# Patient Record
Sex: FEMALE | Race: WHITE | NOT HISPANIC OR LATINO | Employment: OTHER | URBAN - METROPOLITAN AREA
[De-identification: names, ages, dates, MRNs, and addresses within clinical notes are randomized per-mention and may not be internally consistent; named-entity substitution may affect disease eponyms.]

---

## 2017-01-17 ENCOUNTER — HOSPITAL ENCOUNTER (OUTPATIENT)
Dept: CARDIOLOGY | Facility: HOSPITAL | Age: 62
Discharge: HOME | End: 2017-01-17
Attending: PHYSICIAN ASSISTANT | Admitting: PHYSICIAN ASSISTANT

## 2017-01-26 ENCOUNTER — HOSPITAL ENCOUNTER (OUTPATIENT)
Dept: PREOP | Facility: HOSPITAL | Age: 62
Setting detail: HOSPITAL OUTPATIENT SURGERY
Discharge: HOME | End: 2017-01-26
Attending: SURGERY | Admitting: SURGERY

## 2017-01-26 LAB
ANION GAP SERPL CALC-SCNC: 14.9 MMOL/L (ref 10–20)
BUN SERPL-MCNC: 21 MG/DL (ref 8–20)
BUN/CREAT SERPL: 5.3 (ref 5.4–26.2)
CALCIUM SERPL-MCNC: 8.8 MG/DL (ref 8.9–10.3)
CHLORIDE SERPL-SCNC: 97 MMOL/L (ref 101–111)
CONV CO2: 25 MMOL/L (ref 22–32)
CREAT UR-MCNC: 4 MG/DL (ref 0.4–1)
GLUCOSE BLD-MCNC: 84 MG/DL (ref 70–105)
GLUCOSE SERPL-MCNC: 92 MG/DL (ref 65–99)
HCT VFR BLD AUTO: 35.9 % (ref 35–49)
HGB BLD-MCNC: 11.8 G/DL (ref 12–15)
POTASSIUM SERPL-SCNC: 3.9 MMOL/L (ref 3.6–5.1)
SODIUM SERPL-SCNC: 133 MMOL/L (ref 136–144)

## 2017-03-31 ENCOUNTER — HOSPITAL ENCOUNTER (OUTPATIENT)
Dept: PREOP | Facility: HOSPITAL | Age: 62
Setting detail: HOSPITAL OUTPATIENT SURGERY
Discharge: HOME | End: 2017-03-31
Attending: SURGERY | Admitting: SURGERY

## 2017-03-31 LAB
ANION GAP SERPL CALC-SCNC: 13.4 MMOL/L (ref 10–20)
BUN SERPL-MCNC: 15 MG/DL (ref 8–20)
BUN/CREAT SERPL: 5 (ref 5.4–26.2)
CALCIUM SERPL-MCNC: 8.8 MG/DL (ref 8.9–10.3)
CHLORIDE SERPL-SCNC: 96 MMOL/L (ref 101–111)
CONV CO2: 30 MMOL/L (ref 22–32)
CREAT UR-MCNC: 3 MG/DL (ref 0.4–1)
GLUCOSE BLD-MCNC: 80 MG/DL (ref 70–105)
GLUCOSE SERPL-MCNC: 88 MG/DL (ref 65–99)
HCT VFR BLD AUTO: 36.4 % (ref 35–49)
HGB BLD-MCNC: 12.3 G/DL (ref 12–15)
POTASSIUM SERPL-SCNC: 3.4 MMOL/L (ref 3.6–5.1)
SODIUM SERPL-SCNC: 136 MMOL/L (ref 136–144)

## 2017-04-18 ENCOUNTER — ON CAMPUS - OUTPATIENT (AMBULATORY)
Dept: URBAN - METROPOLITAN AREA HOSPITAL 2 | Facility: HOSPITAL | Age: 62
End: 2017-04-18
Payer: MEDICARE

## 2017-04-18 VITALS
OXYGEN SATURATION: 100 % | DIASTOLIC BLOOD PRESSURE: 53 MMHG | DIASTOLIC BLOOD PRESSURE: 57 MMHG | HEART RATE: 66 BPM | SYSTOLIC BLOOD PRESSURE: 164 MMHG | DIASTOLIC BLOOD PRESSURE: 55 MMHG | DIASTOLIC BLOOD PRESSURE: 69 MMHG | HEART RATE: 71 BPM | HEART RATE: 64 BPM | SYSTOLIC BLOOD PRESSURE: 101 MMHG | OXYGEN SATURATION: 99 % | DIASTOLIC BLOOD PRESSURE: 79 MMHG | SYSTOLIC BLOOD PRESSURE: 123 MMHG | SYSTOLIC BLOOD PRESSURE: 145 MMHG | HEART RATE: 72 BPM | SYSTOLIC BLOOD PRESSURE: 113 MMHG | RESPIRATION RATE: 16 BRPM | SYSTOLIC BLOOD PRESSURE: 137 MMHG | SYSTOLIC BLOOD PRESSURE: 117 MMHG | HEART RATE: 70 BPM | SYSTOLIC BLOOD PRESSURE: 103 MMHG | SYSTOLIC BLOOD PRESSURE: 136 MMHG | HEIGHT: 64 IN | DIASTOLIC BLOOD PRESSURE: 50 MMHG | HEART RATE: 67 BPM | DIASTOLIC BLOOD PRESSURE: 44 MMHG | HEART RATE: 68 BPM | TEMPERATURE: 97 F | DIASTOLIC BLOOD PRESSURE: 64 MMHG | RESPIRATION RATE: 20 BRPM | HEART RATE: 57 BPM | DIASTOLIC BLOOD PRESSURE: 72 MMHG | SYSTOLIC BLOOD PRESSURE: 121 MMHG | SYSTOLIC BLOOD PRESSURE: 176 MMHG | DIASTOLIC BLOOD PRESSURE: 51 MMHG | WEIGHT: 147 LBS | RESPIRATION RATE: 18 BRPM

## 2017-04-18 DIAGNOSIS — Z12.11 ENCOUNTER FOR SCREENING FOR MALIGNANT NEOPLASM OF COLON: ICD-10-CM

## 2017-04-18 DIAGNOSIS — K57.30 DIVERTICULOSIS OF LARGE INTESTINE WITHOUT PERFORATION OR ABS: ICD-10-CM

## 2017-04-18 PROCEDURE — G0121 COLON CA SCRN NOT HI RSK IND: HCPCS | Performed by: INTERNAL MEDICINE

## 2017-04-18 RX ADMIN — PROPOFOL: 10 INJECTION, EMULSION INTRAVENOUS at 10:20

## 2018-07-04 ENCOUNTER — EPISODE CHANGES (OUTPATIENT)
Dept: CASE MANAGEMENT | Facility: OTHER | Age: 63
End: 2018-07-04

## 2018-09-11 ENCOUNTER — EPISODE CHANGES (OUTPATIENT)
Dept: CASE MANAGEMENT | Facility: OTHER | Age: 63
End: 2018-09-11

## 2019-05-30 ENCOUNTER — CONVERSION ENCOUNTER (OUTPATIENT)
Dept: FAMILY MEDICINE CLINIC | Facility: CLINIC | Age: 64
End: 2019-05-30

## 2019-06-04 VITALS
OXYGEN SATURATION: 99 % | SYSTOLIC BLOOD PRESSURE: 160 MMHG | BODY MASS INDEX: 24.99 KG/M2 | HEIGHT: 65 IN | WEIGHT: 150 LBS | DIASTOLIC BLOOD PRESSURE: 87 MMHG | HEART RATE: 81 BPM

## 2019-06-05 ENCOUNTER — CONVERSION ENCOUNTER (OUTPATIENT)
Dept: FAMILY MEDICINE CLINIC | Facility: CLINIC | Age: 64
End: 2019-06-05

## 2019-06-05 VITALS
BODY MASS INDEX: 25.66 KG/M2 | SYSTOLIC BLOOD PRESSURE: 147 MMHG | HEART RATE: 93 BPM | OXYGEN SATURATION: 98 % | HEIGHT: 65 IN | WEIGHT: 154 LBS | DIASTOLIC BLOOD PRESSURE: 67 MMHG

## 2019-06-06 NOTE — PROGRESS NOTES
Visit Type:  Acute Visit  Referring Provider:  Naty Kraus DO  Primary Provider:  Naty GREWAL DO    CC:  possible uti, burning, dark urine and discharge.    History of Present Illness:  65 y/o C female here w/ c/o's dysuria x 3 days----Pt goes to HD 2 days a week and only prod urine in am usu.......no hx/o freq UTI's per pt      Vital Signs:    Patient Profile:    64 Years Old Female  Height:     65 inches (165.1 cm)  Weight:     150 pounds  BMI:        24.96     O2 Sat:     99 %  Temp:       98.6 degrees F oral  Pulse rate: 81 / minute  Pulse rhythm:   regular  BP Sitting: 160 / 87  (right arm)    Cuff size:  regular      Problems: Active problems were reviewed with the patient during this visit.  Medications: Medications were reviewed with the patient during this visit.  Allergies: Allergies were reviewed with the patient during this visit.        Vitals Entered By: Anya Deleon (May 30, 2019 3:18 PM)    Past History  Past Medical History (reviewed - no changes required): DMII  HTN  Urinary incont  CAD---  CHOL  Fatty Liver  Hiatal Hernia  Non Erosive Gastritis/ GERD  ASVD----abdominal aorta  Peripheral Neuropathy  ESRD  w/ HD on / / SAT   Anemia of chronic disease  Anxiety   Sz Dx    Surgical History (reviewed - no changes required): Maria Eugenia  ROLA w/ bladder susp/ LSO  Lt ankle Fx w/ORIF  Vitreous hemorrhage and retinal surgery right eye 11  Liver biopsy  EGD x 2  Colonoscopy----2016  Cardiac cath with stent placement ----2018  Dr. Ranjan HUNG Brachiocephalic Fistula ----  Revision of left brachiocephlaic fistula-------   Dr. Floyd  Gastric Bypass 2013 converted to gastric sleeve  due to non-healing ulcer  Heart Cath-----   PTCA----2018    Family History (reviewed - no changes required): Mom----CAD/ DMII/ HTN  Dad---- @ 57 y/o Lung Cancer  Bro---    Social History (reviewed - no changes required): NO TOB/ ETOH/ ILLICITS     ---2019  FLU---  Pneumovax  13/     Family History Summary:      Reviewed history Last on 2019 and no changes required:2019      General Comments - FH:  Mom----CAD/ DMII/ HTN  Dad---- @ 57 y/o Lung Cancer  Bro---      Social History:     Reviewed history from 2019 and no changes required:        NO TOB/ ETOH/ ILLICITS                 ---2019        FLU---        Pneumovax 13/       Risk Factors:     Smoked Tobacco Use:  Never smoker  Smokeless Tobacco Use:  Never  Passive smoke exposure:  no  Drug use:  no  HIV high-risk behavior:  no  Caffeine use:  2 drinks per day  Alcohol use:  no  Exercise:  yes     Type of Exercise:  walking and treadmill  Seatbelt use:  100 %  Sun Exposure:  rarely    Family History Risk Factors:     Family History of MI in females < 65 years old:  no     Family History of MI in males < 55 years old:  no        Review of Systems     General       Denies fever and chills.           Complains of foul urinary discharge, urinary urgency and painful urination.       Denies blood in urine and kidney pain.      Physical Exam    General:      well developed, well nourished, in no acute distress.    Neurologic:      CN II-XII gross intact.    Psych:      alert and cooperative; normal mood and affect; normal attention span and concentration.        Blood Pressure:  Today's BP: 160/87 mm Hg    Labwork:   Most Recent Lab Results:   LDL: 107 MG/DL (CALC) mg/dL 06/10/2013  HbA1c: : 6.3 % OF TOTAL HGB % 2016      Impression & Recommendations:    Problem # 1:  Symptom, dysuria (ICD-788.1) (HWX66-I67.0)  Assessment: New    Her updated medication list for this problem includes:     Levaquin 250 Mg Oral Tablet (Levofloxacin) ..... 2 today then 1 po q48hrs (after hd on sat)      Problem # 2:  End stage renal disease (ICD-585.6) (NEI33-J75.6)  Assessment: Unchanged    Medications Added to Medication List This Visit:  1)  Levaquin 250 Mg Oral Tablet (Levofloxacin) .... 2 today then 1 po q48hrs  (after hd on sat)    Complete Medication List:  1)  Levaquin 250 Mg Oral Tablet (Levofloxacin) .... 2 today then 1 po q48hrs (after hd on sat)  2)  Percocet  .... 5/325   bid prn  3)  Vitamin B12 1,000 Mcg  .... 1 po qday  4)  Cymbalta 60 Mg Oral Capsule Delayed Release Particles (Duloxetine hcl) .... 1 po qam  5)  Phoslo 667 Mg Oral Capsule (Calcium acetate (phos binder)) .... 2 po qac tid  6)  Sensipar 30 Mg Oral Tablet (Cinacalcet hcl) .... 1 po qday  7)  Cyclobenzapr 10mg Tab (Cyclobenzaprine hcl) .... Take 1/2 to 1 (one-half to one) tablet by mouth once daily as needed hand/foot  muscle  spasms  8)  Ranitidine Hcl 150 Mg Oral Tablet (Ranitidine hcl) .... 1 po bid prn  9)  Fosrenol 500 Mg Oral Tablet Chewable (Lanthanum carbonate) .... Chew 1-3 w/ each meal  10)  Alprazolam 1 Mg Oral Tablet (Alprazolam) .... 1 po qday prn  11)  Zofran 4 Mg Oral Tablet (Ondansetron hcl) .... Take 1 tablet by mouth every 8 hrs as needed  12)  Ibis-south Oral Tablet (B complex-c-folic acid) .... 1 daily  13)  Vitamin D3 1000 Unit Oral Tablet (Cholecalciferol) .... Take 2 tablets by mouth daily  14)  Lyrica 150 Mg Oral Capsule (Pregabalin) .... 1 po qhs                    Medication Administration    Orders Added:  1)  Ofc Vst, Est Level III [34232]  ]      Electronically signed by Naty Kraus DO on 05/31/2019 at 8:13 AM  ________________________________________________________________________       Disclaimer: Converted Note message may not contain all data elements that existed in the legacy source system. Please see [facility] for the original note details.

## 2019-06-06 NOTE — PROGRESS NOTES
Visit Type:  Acute Visit  Referring Provider:  german  Primary Provider:  Naty GREWAL DO      History of Present Illness:  Patient presents today for a very painful knots on the right side of her buttock that has been getting more swollen and painful over the past 6 days.  She can no longer sit on that side of her bottom, she has taken  Her last 2  Percocet in the past couple   days.  She has a history of an abscess 4-6 years ago but otherwise does not have recurrent abscesses or infections.  She does not have diabetes.  She does have chronic renal failure and is on dialysis 2 days a week.     she states she has just completed a course of Levaquin for a UTI.  No continued urine symptoms  Patient is going to visit her father in [place] for 1 month, leaving on  in 4 days.      Vital Signs:    Patient Profile:    64 Years Old Female  Height:     65 inches (165.1 cm)  Weight:     154 pounds  BMI:        25.62     O2 Sat:     98 %  Temp:       99.8 degrees F oral  Pulse rate: 93 / minute  Pulse rhythm:   regular  BP Sittin / 67  (right arm)    Cuff size:  regular      Problems: Active problems were reviewed with the patient during this visit.  Medications: Medications were reviewed with the patient during this visit.  Allergies: Allergies were reviewed with the patient during this visit.        Vitals Entered By: Anya Deleon (2019 9:53 AM)    Past History  Past Medical History (reviewed - no changes required): DMII  HTN  Urinary incont  CAD---  CHOL  Fatty Liver  Hiatal Hernia  Non Erosive Gastritis/ GERD  ASVD----abdominal aorta  Peripheral Neuropathy  ESRD  w/ HD on / / SAT   Anemia of chronic disease  Anxiety   Sz Dx    Surgical History (reviewed - no changes required): Maria Eugenia  ROLA w/ bladder susp/ LSO  Lt ankle Fx w/ORIF  Vitreous hemorrhage and retinal surgery right eye 11  Liver biopsy  EGD x 2  Colonoscopy----2016  Cardiac cath with stent placement ----2018    Ranjan  AV Brachiocephalic Fistula ----2016  Revision of left brachiocephlaic fistula-------   Dr. Floyd  Gastric Bypass 2013 converted to gastric sleeve  due to non-healing ulcer  Heart Cath----- 2018  PTCA----2018    Family History (reviewed - no changes required): Mom----CAD/ DMII/ HTN  Dad---- @ 57 y/o Lung Cancer  Bro---    Social History (reviewed - no changes required): NO TOB/ ETOH/ ILLICITS     ---2019  FLU---  Pneumovax 13/     Family History Summary:      Reviewed history Last on 2019 and no changes required:2019      General Comments - FH:  Mom----CAD/ DMII/ HTN  Dad---- @ 57 y/o Lung Cancer  Bro---      Social History:     Reviewed history from 2019 and no changes required:        NO TOB/ ETOH/ ILLICITS                 ---2019        FLU---        Pneumovax 13/       Risk Factors:     Smoked Tobacco Use:  Never smoker  Smokeless Tobacco Use:  Never  Passive smoke exposure:  no  Drug use:  no  HIV high-risk behavior:  no  Caffeine use:  2 drinks per day  Alcohol use:  no  Exercise:  yes     Type of Exercise:  walking and treadmill  Seatbelt use:  100 %  Sun Exposure:  rarely    Family History Risk Factors:     Family History of MI in females < 65 years old:  no     Family History of MI in males < 55 years old:  no        Review of Systems     General       Denies fever, chills, sweats and anorexia.    GI       Complains of diarrhea, change in bowel habits and constipation.       Denies abdominal pain.        history of irritable bowel syndrome.           Denies foul urinary discharge, blood in urine and painful urination.      Physical Exam    General: Well developed, well nourished,  female appearing uncomfortable sitting, tilting pressure off her right buttock  Skin: gluteal fold just right of midline is a very large red, warm,  indurated abscess with a large ridge of indurated tissue measuring at least 4 x 6 cm total with a  2 cm very soft fluctuant center.  It extends to  within 2 cm of the anus, the fistula   track is visible.  Psych: Alert and cooperative      Procedure: I and D of [place] rectal abscess  Discussed treatment option including recommendation to refer to surgery, patient declines asking that I drain the lesion today  Verbal informed consent was obtained.   patient was positioned  supine on the procedure table, the area was cleansed with iodine x3, 3 cc lidocaine with epinephrine were infiltrated into the middle 2 cm of the lesion.  Eleven blade scalpel was used to make a 0.5 cm incision and copious amounts   of thin off-white with grayish and greenish tinted purulent drainage immediately rolled out. additional incision was made to make an X and a total of around 30 cc of drainage was expressed. the area was packed with about 2 inches of 1 inch wide packing,.     A folded 4 x 4 gauze was placed in the gluteal fold taping at the top. patient tolerated well, reporting immediate improvement in pain level.        Blood Pressure:  Today's BP: 147/67 mm Hg    Labwork:   Most Recent Lab Results:   LDL: 107 MG/DL (CALC) mg/dL 06/10/2013  HbA1c: : 6.3 % OF TOTAL HGB % 09/14/2016      Impression & Recommendations:    Problem # 1:  Perirectal abscess (ICD-566) (TBB84-U42.1)    Orders:  I & D Complicated/Multiple (20597)      Problem # 2:  Irritable bowel syndrome with diarrhea (ICD-564.1) (KXK63-I37.0)    May take Imodium as needed to reduce diarrhea while abscess is healing    Problem # 3:  CKD stage VI ESRD on dialysis GFR <15 (ICD-585.6) (NPW78-J80.6)   continue dialysis as scheduled    Medications Added to Medication List This Visit:  1)  Bactrim Ds 800-160 Mg Oral Tablet (Sulfamethoxazole-trimethoprim) .... 1 now, then 1/2 bid for 10 days    Complete Medication List:  1)  Bactrim Ds 800-160 Mg Oral Tablet (Sulfamethoxazole-trimethoprim) .... 1 now, then 1/2 bid for 10 days  2)  Percocet  .... 5/325   bid prn  3)  Vitamin B12  1,000 Mcg  .... 1 po qday  4)  Cymbalta 60 Mg Oral Capsule Delayed Release Particles (Duloxetine hcl) .... 1 po qam  5)  Phoslo 667 Mg Oral Capsule (Calcium acetate (phos binder)) .... 2 po qac tid  6)  Sensipar 30 Mg Oral Tablet (Cinacalcet hcl) .... 1 po qday  7)  Cyclobenzapr 10mg Tab (Cyclobenzaprine hcl) .... Take 1/2 to 1 (one-half to one) tablet by mouth once daily as needed hand/foot  muscle  spasms  8)  Ranitidine Hcl 150 Mg Oral Tablet (Ranitidine hcl) .... 1 po bid prn  9)  Fosrenol 500 Mg Oral Tablet Chewable (Lanthanum carbonate) .... Chew 1-3 w/ each meal  10)  Alprazolam 1 Mg Oral Tablet (Alprazolam) .... 1 po qday prn  11)  Zofran 4 Mg Oral Tablet (Ondansetron hcl) .... Take 1 tablet by mouth every 8 hrs as needed  12)  Ibis-south Oral Tablet (B complex-c-folic acid) .... 1 daily  13)  Vitamin D3 1000 Unit Oral Tablet (Cholecalciferol) .... Take 2 tablets by mouth daily  14)  Lyrica 150 Mg Oral Capsule (Pregabalin) .... 1 po qhs        Patient Instructions:  1)    The patient was advised to change the gauze   When it becomes saturated,  and with every bowel movement, consider wearing a pad and disposable incontinence underwear to collect any additional drainage. she is to return in 2 days for a wound check and   possible packing change.  She was advised to take Bactrim DS 1 tablets today and then 1/2 tablet twice a day for 10 days holding dose until after dialysis if dialysis is to be done in the following 6 hours.    Medications:  BACTRIM -160 MG ORAL TABLET (SULFAMETHOXAZOLE-TRIMETHOPRIM) 1 now, then 1/2 bid for 10 days  #10[Tablet] x 0      Entered by: Anya Deleon      Authorized by:  Deborah Porras MD      Electronically signed by:   Anya Deleon on 06/05/2019      Method used:    Electronically to               [id number] (retail)              [address]              [place]  [address]              Ph: [phone]              Fax: [phone]      Note  to Pharmacy: Route: ORAL;       RxID:   [id number]                Medication Administration    Orders Added:  1)  Ofc Vst, Est Level IV [34915]  2)  I & D Complicated/Multiple [[id number]]  ]      Electronically signed by Deborah Porras MD on 06/05/2019 at 12:30 PM  ________________________________________________________________________       Disclaimer: Converted Note message may not contain all data elements that existed in the legacy source system. Please see [facility] for the original note details.

## 2019-08-01 PROBLEM — L97.509 TOE ULCER (HCC): Status: ACTIVE | Noted: 2019-05-02

## 2019-08-01 PROBLEM — E78.5 HYPERLIPIDEMIA: Status: ACTIVE | Noted: 2019-08-01

## 2019-08-01 PROBLEM — K58.0 IRRITABLE BOWEL SYNDROME WITH DIARRHEA: Status: ACTIVE | Noted: 2017-10-26

## 2019-08-01 PROBLEM — T30.0 BURN: Status: ACTIVE | Noted: 2018-01-04

## 2019-08-01 PROBLEM — M79.18 BUTTOCK PAIN: Status: ACTIVE | Noted: 2018-01-04

## 2019-08-01 PROBLEM — F41.8 MIXED ANXIETY DEPRESSIVE DISORDER: Status: ACTIVE | Noted: 2017-02-28

## 2019-08-01 PROBLEM — I25.10 CORONARY HEART DISEASE: Status: ACTIVE | Noted: 2018-04-30

## 2019-08-01 PROBLEM — N18.6 END STAGE RENAL DISEASE (HCC): Status: ACTIVE | Noted: 2019-06-05

## 2019-08-01 PROBLEM — K21.9 GASTROESOPHAGEAL REFLUX DISEASE: Status: ACTIVE | Noted: 2019-08-01

## 2019-08-01 PROBLEM — E11.9 TYPE 2 DIABETES MELLITUS (HCC): Status: ACTIVE | Noted: 2019-08-01

## 2019-08-01 PROBLEM — Z88.5 ALLERGY STATUS TO NARCOTIC AGENT: Status: ACTIVE | Noted: 2017-06-20

## 2019-08-01 PROBLEM — R21 RASH: Status: ACTIVE | Noted: 2018-07-12

## 2019-08-01 PROBLEM — R42 POSTURAL LIGHTHEADEDNESS: Status: ACTIVE | Noted: 2018-05-07

## 2019-08-01 PROBLEM — R55 SYNCOPE: Status: ACTIVE | Noted: 2018-05-14

## 2019-08-01 PROBLEM — Z12.11 COLON CANCER SCREENING: Status: ACTIVE | Noted: 2017-02-28

## 2019-08-01 PROBLEM — K61.1 PERIRECTAL ABSCESS: Status: ACTIVE | Noted: 2019-06-05

## 2019-08-01 PROBLEM — R56.9 GENERALIZED SEIZURE (HCC): Status: ACTIVE | Noted: 2018-06-14

## 2019-08-01 PROBLEM — R25.2 MUSCLE CRAMPS: Status: ACTIVE | Noted: 2018-06-14

## 2019-08-01 PROBLEM — IMO0002 MASS: Status: ACTIVE | Noted: 2018-01-04

## 2019-08-01 PROBLEM — Z23 NEED FOR OTHER PROPHYLACTIC VACCINATION AND INOCULATION AGAINST SINGLE DISEASES: Status: ACTIVE | Noted: 2018-10-11

## 2019-08-01 PROBLEM — R30.0 DYSURIA: Status: ACTIVE | Noted: 2019-05-30

## 2019-08-01 PROBLEM — I10 HYPERTENSION: Status: ACTIVE | Noted: 2019-08-01

## 2019-08-01 PROBLEM — Z63.4 BEREAVEMENT: Status: ACTIVE | Noted: 2019-02-25

## 2019-09-05 ENCOUNTER — TELEPHONE (OUTPATIENT)
Dept: FAMILY MEDICINE CLINIC | Facility: CLINIC | Age: 64
End: 2019-09-05

## 2019-09-05 RX ORDER — OXYCODONE HYDROCHLORIDE AND ACETAMINOPHEN 5; 325 MG/1; MG/1
1 TABLET ORAL 2 TIMES DAILY PRN
Qty: 10 TABLET | Refills: 0 | Status: SHIPPED | OUTPATIENT
Start: 2019-09-05 | End: 2019-10-14 | Stop reason: SDUPTHER

## 2019-10-14 ENCOUNTER — OFFICE VISIT (OUTPATIENT)
Dept: FAMILY MEDICINE CLINIC | Facility: CLINIC | Age: 64
End: 2019-10-14

## 2019-10-14 VITALS
TEMPERATURE: 98.4 F | SYSTOLIC BLOOD PRESSURE: 200 MMHG | BODY MASS INDEX: 24.99 KG/M2 | DIASTOLIC BLOOD PRESSURE: 75 MMHG | RESPIRATION RATE: 14 BRPM | WEIGHT: 150 LBS | HEIGHT: 65 IN | HEART RATE: 82 BPM | OXYGEN SATURATION: 96 %

## 2019-10-14 DIAGNOSIS — F41.9 ANXIETY: ICD-10-CM

## 2019-10-14 DIAGNOSIS — G89.29 CHRONIC BILATERAL LOW BACK PAIN WITHOUT SCIATICA: ICD-10-CM

## 2019-10-14 DIAGNOSIS — Z12.31 ENCOUNTER FOR SCREENING MAMMOGRAM FOR BREAST CANCER: ICD-10-CM

## 2019-10-14 DIAGNOSIS — I10 ESSENTIAL HYPERTENSION: Primary | ICD-10-CM

## 2019-10-14 DIAGNOSIS — M65.331 TRIGGER MIDDLE FINGER OF RIGHT HAND: ICD-10-CM

## 2019-10-14 DIAGNOSIS — M54.50 CHRONIC BILATERAL LOW BACK PAIN WITHOUT SCIATICA: ICD-10-CM

## 2019-10-14 PROCEDURE — 99214 OFFICE O/P EST MOD 30 MIN: CPT | Performed by: FAMILY MEDICINE

## 2019-10-14 RX ORDER — FOLIC ACID/VIT B COMPLEX AND C 0.8 MG
1 TABLET ORAL DAILY
Qty: 90 EACH | Refills: 3 | Status: SHIPPED | OUTPATIENT
Start: 2019-10-14

## 2019-10-14 RX ORDER — OXYCODONE HYDROCHLORIDE AND ACETAMINOPHEN 5; 325 MG/1; MG/1
1 TABLET ORAL 2 TIMES DAILY PRN
Qty: 12 TABLET | Refills: 0 | Status: SHIPPED | OUTPATIENT
Start: 2019-10-14 | End: 2019-10-26

## 2019-10-14 RX ORDER — ALPRAZOLAM 1 MG/1
1 TABLET ORAL DAILY PRN
Qty: 12 TABLET | Refills: 0 | Status: ON HOLD | OUTPATIENT
Start: 2019-10-14 | End: 2020-03-13

## 2019-10-14 RX ORDER — CYCLOBENZAPRINE HCL 10 MG
5-10 TABLET ORAL DAILY PRN
Qty: 60 TABLET | Refills: 1 | Status: ON HOLD | OUTPATIENT
Start: 2019-10-14 | End: 2020-03-13

## 2019-10-14 RX ORDER — DULOXETIN HYDROCHLORIDE 60 MG/1
60 CAPSULE, DELAYED RELEASE ORAL EVERY MORNING
Qty: 90 CAPSULE | Refills: 1 | Status: ON HOLD | OUTPATIENT
Start: 2019-10-14 | End: 2019-10-26

## 2019-10-14 RX ORDER — LANTHANUM CARBONATE 500 MG/1
500-1500 TABLET, CHEWABLE ORAL 3 TIMES DAILY
Qty: 630 TABLET | Refills: 3 | Status: ON HOLD | OUTPATIENT
Start: 2019-10-14 | End: 2020-03-13

## 2019-10-14 NOTE — PROGRESS NOTES
Subjective   Michelle Howell is a 64 y.o. female.     Chief Complaint   Patient presents with   • Arthritis     right middle finger arthritis    • Vaginal Discharge     sticky vaginal discharge, no smell to it. Has had since 5/2019.    • Hypertension     needs refills on everything, pt is going to visit her mom she is dying.    • Depression         Current Outpatient Medications:   •  ALPRAZolam (XANAX) 1 MG tablet, Take 1 tablet by mouth Daily As Needed for Anxiety., Disp: 12 tablet, Rfl: 0  •  B Complex-C-Folic Acid (DE-NICKY) tablet, Take 1 tablet by mouth Daily., Disp: 90 each, Rfl: 3  •  cholecalciferol (VITAMIN D3) 1000 units tablet, Take 2 tablets by mouth Daily., Disp: , Rfl:   •  cinacalcet (SENSIPAR) 30 MG tablet, Take 1 tablet by mouth Daily., Disp: , Rfl:   •  cyclobenzaprine (FLEXERIL) 10 MG tablet, Take 0.5-1 tablets by mouth Daily As Needed for Muscle Spasms., Disp: 60 tablet, Rfl: 1  •  DULoxetine (CYMBALTA) 60 MG capsule, Take 1 capsule by mouth Every Morning., Disp: 90 capsule, Rfl: 1  •  lanthanum (FOSRENOL) 500 MG chewable tablet, Chew 1-3 tablets 3 (Three) Times a Day. With meal, Disp: 630 tablet, Rfl: 3  •  LYRICA 150 MG capsule, Take 1 capsule by mouth every night at bedtime., Disp: 90 capsule, Rfl: 1  •  ondansetron (ZOFRAN) 4 MG tablet, Take 1 tablet by mouth Every 8 (Eight) Hours As Needed., Disp: , Rfl:   •  oxyCODONE-acetaminophen (PERCOCET) 5-325 MG per tablet, Take 1 tablet by mouth 2 (Two) Times a Day As Needed for Severe Pain ., Disp: 12 tablet, Rfl: 0  •  vitamin B-12 (CYANOCOBALAMIN) 1000 MCG tablet, Take 1 tablet by mouth Daily., Disp: , Rfl:     Past Medical History:   Diagnosis Date   • Anxiety    • ASVD (arteriosclerotic vascular disease)     Abdominal Aorta   • CAD (coronary artery disease)    • Chronic anemia     Anemia of Chronic Disease   • ESRD (end stage renal disease) (CMS/HCC)     With HD on TU/TH/SAT   • Fatty liver    • Gastritis     Non Erosive   • GERD  (gastroesophageal reflux disease)    • Hiatal hernia    • Hypertension    • Peripheral neuropathy    • Seizure disorder (CMS/HCC)    • Type 2 diabetes mellitus (CMS/HCC)    • Urinary incontinence        Past Surgical History:   Procedure Laterality Date   • ARTERIOVENOUS FISTULA REPAIR Left 2017    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   • COLONOSCOPY  2004    2004, 2016   • CORONARY ANGIOPLASTY  2018   • ENDOSCOPY      x2   • GASTRIC BYPASS  2013    Converted to gastric sleeve 2014 due to non-healing ulcer   • LIVER BIOPSY     • ORIF ANKLE FRACTURE Left     Lt ankle Fx w/ORIF   • OTHER SURGICAL HISTORY Right 07/29/2011    Vitreous hemorrhage and retinal surgery right eye   • TOTAL ABDOMINAL HYSTERECTOMY      w/bladder susp/LSO       Family History   Problem Relation Age of Onset   • Diabetes Mother    • Hypertension Mother    • Coronary artery disease Mother    • Dementia Mother    • Heart disease Mother    • Diabetes Father    • Cancer Father         Lung Cancer       Social History     Socioeconomic History   • Marital status:      Spouse name: Not on file   • Number of children: Not on file   • Years of education: Not on file   • Highest education level: Not on file   Tobacco Use   • Smoking status: Never Smoker   • Smokeless tobacco: Never Used   Substance and Sexual Activity   • Alcohol use: No     Frequency: Never   • Drug use: No   • Sexual activity: Defer       63y/o C female w/ HTN/ Chronic back pain/ Dep/ ESRD on HD 3x/ week here for mult med refills and f/u appt  Pt states her BP is usu high before HD and then normal afterwards    Pt's mom is actively dying w/ Dementia/ COPD/ DMII/ Cardiomyopathy in [place]----Pt is planning on selling her house/ getting a new one in close to town/ and moving her mom (?dad) in w/ her....Pt's brother lives  in [place] but her mom is wanting to move back home      Pt also c/o's Rt hand digit #3 locking when she bends it and has to manually straighten it -----not wanting to see K/K yet for this since pain not that bad          The following portions of the patient's history were reviewed and updated as appropriate: allergies, current medications, past family history, past medical history, past social history, past surgical history and problem list.    Review of Systems   Constitutional: Negative for appetite change, unexpected weight gain and unexpected weight loss.   Respiratory: Negative for cough and shortness of breath.    Cardiovascular: Negative for chest pain and leg swelling.   Gastrointestinal: Negative for nausea and vomiting.   Genitourinary: Positive for vaginal discharge. Negative for vaginal bleeding and vaginal pain.        No vag odor    Musculoskeletal: Positive for arthralgias and back pain. Negative for joint swelling.   Skin: Negative for rash.   Psychiatric/Behavioral: Positive for stress. Negative for sleep disturbance and depressed mood. The patient is not nervous/anxious.        Vitals:    10/14/19 1348   BP: (!) 200/75   Pulse: 82   Resp: 14   Temp: 98.4 °F (36.9 °C)   SpO2: 96%       Objective   Physical Exam   Constitutional: She is oriented to person, place, and time. She appears well-developed and well-nourished. No distress.   HENT:   Head: Normocephalic and atraumatic.   Cardiovascular: Normal rate, regular rhythm and normal heart sounds.   No murmur heard.  Pulmonary/Chest: Effort normal and breath sounds normal. No respiratory distress.   Musculoskeletal: She exhibits no edema.        Arms:  Neurological: She is alert and oriented to person, place, and time. No cranial nerve deficit.   Skin: Skin is warm and dry.   Psychiatric: She has a normal mood and affect. Her behavior is normal. Judgment and thought content normal.   Nursing note and vitals reviewed.        Assessment/Plan   Michelle was  seen today for arthritis, vaginal discharge, hypertension and depression.    Diagnoses and all orders for this visit:    Essential hypertension    Trigger middle finger of right hand    Anxiety    Chronic bilateral low back pain without sciatica    Encounter for screening mammogram for breast cancer  -     Mammo Screening Digital Tomosynthesis Bilateral With CAD; Future    Other orders  -     ALPRAZolam (XANAX) 1 MG tablet; Take 1 tablet by mouth Daily As Needed for Anxiety.  -     oxyCODONE-acetaminophen (PERCOCET) 5-325 MG per tablet; Take 1 tablet by mouth 2 (Two) Times a Day As Needed for Severe Pain .  -     B Complex-C-Folic Acid (DE-NICKY) tablet; Take 1 tablet by mouth Daily.  -     cyclobenzaprine (FLEXERIL) 10 MG tablet; Take 0.5-1 tablets by mouth Daily As Needed for Muscle Spasms.  -     DULoxetine (CYMBALTA) 60 MG capsule; Take 1 capsule by mouth Every Morning.  -     lanthanum (FOSRENOL) 500 MG chewable tablet; Chew 1-3 tablets 3 (Three) Times a Day. With meal  -     LYRICA 150 MG capsule; Take 1 capsule by mouth every night at bedtime.

## 2019-10-26 ENCOUNTER — HOSPITAL ENCOUNTER (INPATIENT)
Facility: HOSPITAL | Age: 64
LOS: 5 days | Discharge: HOME | End: 2019-11-03
Attending: INTERNAL MEDICINE | Admitting: INTERNAL MEDICINE

## 2019-10-26 DIAGNOSIS — I25.118 CORONARY ARTERY DISEASE OF NATIVE ARTERY OF NATIVE HEART WITH STABLE ANGINA PECTORIS (HCC): ICD-10-CM

## 2019-10-26 DIAGNOSIS — R94.39 ABNORMAL NUCLEAR STRESS TEST: Primary | ICD-10-CM

## 2019-10-26 PROBLEM — R06.00 ACUTE DYSPNEA: Status: ACTIVE | Noted: 2019-10-26

## 2019-10-26 LAB
ANION GAP SERPL CALCULATED.3IONS-SCNC: 14 MMOL/L (ref 5–15)
BUN BLD-MCNC: 15 MG/DL (ref 8–23)
BUN/CREAT SERPL: 4.5 (ref 7–25)
CALCIUM SPEC-SCNC: 8.3 MG/DL (ref 8.6–10.5)
CHLORIDE SERPL-SCNC: 96 MMOL/L (ref 98–107)
CO2 SERPL-SCNC: 25 MMOL/L (ref 22–29)
CREAT BLD-MCNC: 3.3 MG/DL (ref 0.57–1)
GFR SERPL CREATININE-BSD FRML MDRD: 14 ML/MIN/1.73
GFR SERPL CREATININE-BSD FRML MDRD: ABNORMAL ML/MIN/{1.73_M2}
GLUCOSE BLD-MCNC: 133 MG/DL (ref 65–99)
POTASSIUM BLD-SCNC: 3.3 MMOL/L (ref 3.5–5.2)
SODIUM BLD-SCNC: 135 MMOL/L (ref 136–145)
TROPONIN T SERPL-MCNC: 0.04 NG/ML (ref 0–0.03)

## 2019-10-26 PROCEDURE — G0378 HOSPITAL OBSERVATION PER HR: HCPCS

## 2019-10-26 PROCEDURE — 25010000002 HEPARIN (PORCINE) PER 1000 UNITS: Performed by: NURSE PRACTITIONER

## 2019-10-26 PROCEDURE — 99222 1ST HOSP IP/OBS MODERATE 55: CPT | Performed by: NURSE PRACTITIONER

## 2019-10-26 PROCEDURE — 80048 BASIC METABOLIC PNL TOTAL CA: CPT | Performed by: NURSE PRACTITIONER

## 2019-10-26 PROCEDURE — 84484 ASSAY OF TROPONIN QUANT: CPT | Performed by: NURSE PRACTITIONER

## 2019-10-26 PROCEDURE — 83036 HEMOGLOBIN GLYCOSYLATED A1C: CPT | Performed by: NURSE PRACTITIONER

## 2019-10-26 PROCEDURE — 25010000002 HYDRALAZINE PER 20 MG: Performed by: INTERNAL MEDICINE

## 2019-10-26 RX ORDER — SODIUM CHLORIDE 0.9 % (FLUSH) 0.9 %
10 SYRINGE (ML) INJECTION AS NEEDED
Status: DISCONTINUED | OUTPATIENT
Start: 2019-10-26 | End: 2019-11-03 | Stop reason: HOSPADM

## 2019-10-26 RX ORDER — NICOTINE POLACRILEX 4 MG
15 LOZENGE BUCCAL
Status: DISCONTINUED | OUTPATIENT
Start: 2019-10-26 | End: 2019-11-03 | Stop reason: HOSPADM

## 2019-10-26 RX ORDER — ASPIRIN 81 MG/1
81 TABLET ORAL DAILY
COMMUNITY

## 2019-10-26 RX ORDER — CYCLOBENZAPRINE HCL 10 MG
5 TABLET ORAL 3 TIMES DAILY PRN
Status: DISCONTINUED | OUTPATIENT
Start: 2019-10-26 | End: 2019-11-03 | Stop reason: HOSPADM

## 2019-10-26 RX ORDER — ACETAMINOPHEN 160 MG/5ML
650 SOLUTION ORAL EVERY 4 HOURS PRN
Status: DISCONTINUED | OUTPATIENT
Start: 2019-10-26 | End: 2019-11-03 | Stop reason: HOSPADM

## 2019-10-26 RX ORDER — BUPROPION HYDROCHLORIDE 150 MG/1
150 TABLET ORAL DAILY
Status: DISCONTINUED | OUTPATIENT
Start: 2019-10-27 | End: 2019-11-03 | Stop reason: HOSPADM

## 2019-10-26 RX ORDER — SODIUM CHLORIDE 0.9 % (FLUSH) 0.9 %
10 SYRINGE (ML) INJECTION EVERY 12 HOURS SCHEDULED
Status: DISCONTINUED | OUTPATIENT
Start: 2019-10-26 | End: 2019-11-03 | Stop reason: HOSPADM

## 2019-10-26 RX ORDER — DICYCLOMINE HYDROCHLORIDE 10 MG/1
10 CAPSULE ORAL 2 TIMES DAILY
Status: ON HOLD | COMMUNITY
End: 2020-03-13

## 2019-10-26 RX ORDER — PREGABALIN 75 MG/1
150 CAPSULE ORAL NIGHTLY
Status: DISCONTINUED | OUTPATIENT
Start: 2019-10-26 | End: 2019-11-03 | Stop reason: HOSPADM

## 2019-10-26 RX ORDER — HYDROCODONE BITARTRATE AND ACETAMINOPHEN 10; 325 MG/1; MG/1
1 TABLET ORAL EVERY 6 HOURS PRN
Status: DISCONTINUED | OUTPATIENT
Start: 2019-10-26 | End: 2019-11-03 | Stop reason: HOSPADM

## 2019-10-26 RX ORDER — ONDANSETRON 2 MG/ML
4 INJECTION INTRAMUSCULAR; INTRAVENOUS EVERY 6 HOURS PRN
Status: DISCONTINUED | OUTPATIENT
Start: 2019-10-26 | End: 2019-11-03 | Stop reason: HOSPADM

## 2019-10-26 RX ORDER — HEPARIN SODIUM 5000 [USP'U]/ML
5000 INJECTION, SOLUTION INTRAVENOUS; SUBCUTANEOUS EVERY 12 HOURS SCHEDULED
Status: DISCONTINUED | OUTPATIENT
Start: 2019-10-26 | End: 2019-10-27

## 2019-10-26 RX ORDER — DICYCLOMINE HYDROCHLORIDE 10 MG/1
10 CAPSULE ORAL 2 TIMES DAILY
Status: DISCONTINUED | OUTPATIENT
Start: 2019-10-26 | End: 2019-11-03 | Stop reason: HOSPADM

## 2019-10-26 RX ORDER — ACETAMINOPHEN 650 MG/1
650 SUPPOSITORY RECTAL EVERY 4 HOURS PRN
Status: DISCONTINUED | OUTPATIENT
Start: 2019-10-26 | End: 2019-11-03 | Stop reason: HOSPADM

## 2019-10-26 RX ORDER — ACETAMINOPHEN 325 MG/1
650 TABLET ORAL EVERY 4 HOURS PRN
Status: DISCONTINUED | OUTPATIENT
Start: 2019-10-26 | End: 2019-11-03 | Stop reason: HOSPADM

## 2019-10-26 RX ORDER — ALPRAZOLAM 1 MG/1
1 TABLET ORAL DAILY PRN
Status: DISCONTINUED | OUTPATIENT
Start: 2019-10-26 | End: 2019-11-03 | Stop reason: HOSPADM

## 2019-10-26 RX ORDER — LEVETIRACETAM 500 MG/1
500 TABLET ORAL 2 TIMES DAILY
Status: DISCONTINUED | OUTPATIENT
Start: 2019-10-26 | End: 2019-11-03 | Stop reason: HOSPADM

## 2019-10-26 RX ORDER — DEXTROSE MONOHYDRATE 25 G/50ML
25 INJECTION, SOLUTION INTRAVENOUS
Status: DISCONTINUED | OUTPATIENT
Start: 2019-10-26 | End: 2019-11-03 | Stop reason: HOSPADM

## 2019-10-26 RX ORDER — BUPROPION HYDROCHLORIDE 150 MG/1
150 TABLET ORAL DAILY
Status: ON HOLD | COMMUNITY
End: 2020-03-13

## 2019-10-26 RX ORDER — ASPIRIN 81 MG/1
81 TABLET ORAL DAILY
Status: DISCONTINUED | OUTPATIENT
Start: 2019-10-27 | End: 2019-11-03 | Stop reason: HOSPADM

## 2019-10-26 RX ORDER — LOPERAMIDE HYDROCHLORIDE 2 MG/1
2 CAPSULE ORAL 4 TIMES DAILY PRN
Status: DISCONTINUED | OUTPATIENT
Start: 2019-10-26 | End: 2019-10-30

## 2019-10-26 RX ORDER — LANTHANUM CARBONATE 500 MG/1
1000 TABLET, CHEWABLE ORAL
Status: DISCONTINUED | OUTPATIENT
Start: 2019-10-27 | End: 2019-11-01

## 2019-10-26 RX ORDER — HYDRALAZINE HYDROCHLORIDE 20 MG/ML
10 INJECTION INTRAMUSCULAR; INTRAVENOUS EVERY 6 HOURS PRN
Status: DISCONTINUED | OUTPATIENT
Start: 2019-10-26 | End: 2019-11-03 | Stop reason: HOSPADM

## 2019-10-26 RX ORDER — ONDANSETRON 4 MG/1
4 TABLET, FILM COATED ORAL EVERY 6 HOURS PRN
Status: DISCONTINUED | OUTPATIENT
Start: 2019-10-26 | End: 2019-11-03 | Stop reason: HOSPADM

## 2019-10-26 RX ORDER — LANOLIN ALCOHOL/MO/W.PET/CERES
1000 CREAM (GRAM) TOPICAL DAILY
Status: DISCONTINUED | OUTPATIENT
Start: 2019-10-27 | End: 2019-11-03 | Stop reason: HOSPADM

## 2019-10-26 RX ORDER — HYDROCODONE BITARTRATE AND ACETAMINOPHEN 5; 325 MG/1; MG/1
1 TABLET ORAL EVERY 6 HOURS PRN
COMMUNITY
End: 2020-04-21 | Stop reason: SDUPTHER

## 2019-10-26 RX ORDER — FOLIC ACID/VIT B COMPLEX AND C 400 MCG
1 TABLET ORAL DAILY
Status: DISCONTINUED | OUTPATIENT
Start: 2019-10-27 | End: 2019-11-03 | Stop reason: HOSPADM

## 2019-10-26 RX ORDER — LEVETIRACETAM 500 MG/1
500 TABLET ORAL 2 TIMES DAILY
Status: ON HOLD | COMMUNITY
End: 2020-03-13

## 2019-10-26 RX ADMIN — LOPERAMIDE HYDROCHLORIDE 2 MG: 2 CAPSULE ORAL at 21:49

## 2019-10-26 RX ADMIN — HYDROCODONE BITARTRATE AND ACETAMINOPHEN 1 TABLET: 10; 325 TABLET ORAL at 21:46

## 2019-10-26 RX ADMIN — Medication 10 ML: at 21:46

## 2019-10-26 RX ADMIN — HEPARIN SODIUM 5000 UNITS: 5000 INJECTION INTRAVENOUS; SUBCUTANEOUS at 21:46

## 2019-10-26 RX ADMIN — DICYCLOMINE HYDROCHLORIDE 10 MG: 10 CAPSULE ORAL at 21:48

## 2019-10-26 RX ADMIN — PREGABALIN 150 MG: 75 CAPSULE ORAL at 21:47

## 2019-10-26 RX ADMIN — HYDRALAZINE HYDROCHLORIDE 10 MG: 20 INJECTION INTRAMUSCULAR; INTRAVENOUS at 23:31

## 2019-10-26 RX ADMIN — LEVETIRACETAM 500 MG: 500 TABLET ORAL at 21:48

## 2019-10-27 ENCOUNTER — HOSPITAL ENCOUNTER (INPATIENT)
Dept: CARDIOLOGY | Facility: HOSPITAL | Age: 64
Discharge: HOME | End: 2019-10-27

## 2019-10-27 LAB
ANION GAP SERPL CALCULATED.3IONS-SCNC: 15 MMOL/L (ref 5–15)
APTT PPP: 26.6 SECONDS (ref 24–31)
BASOPHILS # BLD AUTO: 0 10*3/MM3 (ref 0–0.2)
BASOPHILS NFR BLD AUTO: 0.4 % (ref 0–1.5)
BH CV ECHO MEAS - ACS: 1.6 CM
BH CV ECHO MEAS - AO MAX PG (FULL): 7.8 MMHG
BH CV ECHO MEAS - AO MAX PG: 10.4 MMHG
BH CV ECHO MEAS - AO MEAN PG (FULL): 3.8 MMHG
BH CV ECHO MEAS - AO MEAN PG: 5.1 MMHG
BH CV ECHO MEAS - AO ROOT AREA (BSA CORRECTED): 2
BH CV ECHO MEAS - AO ROOT AREA: 8.6 CM^2
BH CV ECHO MEAS - AO ROOT DIAM: 3.3 CM
BH CV ECHO MEAS - AO V2 MAX: 161 CM/SEC
BH CV ECHO MEAS - AO V2 MEAN: 105 CM/SEC
BH CV ECHO MEAS - AO V2 VTI: 34 CM
BH CV ECHO MEAS - AORTIC HR: 69.8 BPM
BH CV ECHO MEAS - AORTIC R-R: 0.86 SEC
BH CV ECHO MEAS - ASC AORTA: 3.1 CM
BH CV ECHO MEAS - AVA(I,A): 2.4 CM^2
BH CV ECHO MEAS - AVA(I,D): 2.4 CM^2
BH CV ECHO MEAS - AVA(V,A): 2.2 CM^2
BH CV ECHO MEAS - AVA(V,D): 2.2 CM^2
BH CV ECHO MEAS - BSA(HAYCOCK): 1.7 M^2
BH CV ECHO MEAS - BSA: 1.7 M^2
BH CV ECHO MEAS - BZI_BMI: 23.9 KILOGRAMS/M^2
BH CV ECHO MEAS - BZI_METRIC_HEIGHT: 162.6 CM
BH CV ECHO MEAS - BZI_METRIC_WEIGHT: 63 KG
BH CV ECHO MEAS - CI(AO): 12.1 L/MIN/M^2
BH CV ECHO MEAS - CI(LVOT): 3.5 L/MIN/M^2
BH CV ECHO MEAS - CO(AO): 20.3 L/MIN
BH CV ECHO MEAS - CO(LVOT): 5.8 L/MIN
BH CV ECHO MEAS - EDV(CUBED): 163.1 ML
BH CV ECHO MEAS - EDV(MOD-SP4): 84.3 ML
BH CV ECHO MEAS - EDV(TEICH): 145.2 ML
BH CV ECHO MEAS - EF(CUBED): 74.3 %
BH CV ECHO MEAS - EF(MOD-BP): 62 %
BH CV ECHO MEAS - EF(MOD-SP4): 62.1 %
BH CV ECHO MEAS - EF(TEICH): 65.6 %
BH CV ECHO MEAS - ESV(CUBED): 41.9 ML
BH CV ECHO MEAS - ESV(MOD-SP4): 32 ML
BH CV ECHO MEAS - ESV(TEICH): 49.9 ML
BH CV ECHO MEAS - FS: 36.4 %
BH CV ECHO MEAS - IVS/LVPW: 0.97
BH CV ECHO MEAS - IVSD: 1.3 CM
BH CV ECHO MEAS - LA DIMENSION(2D): 4.7 CM
BH CV ECHO MEAS - LV DIASTOLIC VOL/BSA (35-75): 50.3 ML/M^2
BH CV ECHO MEAS - LV MASS(C)D: 291.3 GRAMS
BH CV ECHO MEAS - LV MASS(C)DI: 173.8 GRAMS/M^2
BH CV ECHO MEAS - LV MAX PG: 2.6 MMHG
BH CV ECHO MEAS - LV MEAN PG: 1.3 MMHG
BH CV ECHO MEAS - LV SYSTOLIC VOL/BSA (12-30): 19.1 ML/M^2
BH CV ECHO MEAS - LV V1 MAX: 79.9 CM/SEC
BH CV ECHO MEAS - LV V1 MEAN: 53.5 CM/SEC
BH CV ECHO MEAS - LV V1 VTI: 18.9 CM
BH CV ECHO MEAS - LVIDD: 5.5 CM
BH CV ECHO MEAS - LVIDS: 3.5 CM
BH CV ECHO MEAS - LVOT AREA: 4.4 CM^2
BH CV ECHO MEAS - LVOT DIAM: 2.4 CM
BH CV ECHO MEAS - LVPWD: 1.3 CM
BH CV ECHO MEAS - MV A MAX VEL: 102.1 CM/SEC
BH CV ECHO MEAS - MV DEC SLOPE: 992.6 CM/SEC^2
BH CV ECHO MEAS - MV DEC TIME: 0.18 SEC
BH CV ECHO MEAS - MV E MAX VEL: 181.3 CM/SEC
BH CV ECHO MEAS - MV E/A: 1.8
BH CV ECHO MEAS - MV MAX PG: 17.5 MMHG
BH CV ECHO MEAS - MV MEAN PG: 6 MMHG
BH CV ECHO MEAS - MV V2 MAX: 209.2 CM/SEC
BH CV ECHO MEAS - MV V2 MEAN: 114.8 CM/SEC
BH CV ECHO MEAS - MV V2 VTI: 47.2 CM
BH CV ECHO MEAS - MVA(VTI): 1.8 CM^2
BH CV ECHO MEAS - PA ACC TIME: 0.1 SEC
BH CV ECHO MEAS - PA MAX PG (FULL): -0.05 MMHG
BH CV ECHO MEAS - PA MAX PG: 2.6 MMHG
BH CV ECHO MEAS - PA MEAN PG (FULL): 0.18 MMHG
BH CV ECHO MEAS - PA MEAN PG: 1.5 MMHG
BH CV ECHO MEAS - PA PR(ACCEL): 36.2 MMHG
BH CV ECHO MEAS - PA V2 MAX: 80.4 CM/SEC
BH CV ECHO MEAS - PA V2 MEAN: 58.9 CM/SEC
BH CV ECHO MEAS - PA V2 VTI: 20.6 CM
BH CV ECHO MEAS - PULM A REVS DUR: 0.12 SEC
BH CV ECHO MEAS - PULM A REVS VEL: 31.7 CM/SEC
BH CV ECHO MEAS - PULM DIAS VEL: 79.1 CM/SEC
BH CV ECHO MEAS - PULM S/D: 0.41
BH CV ECHO MEAS - PULM SYS VEL: 32.6 CM/SEC
BH CV ECHO MEAS - PVA(I,A): 6.5 CM^2
BH CV ECHO MEAS - PVA(I,D): 6.5 CM^2
BH CV ECHO MEAS - PVA(V,A): 7.1 CM^2
BH CV ECHO MEAS - PVA(V,D): 7.1 CM^2
BH CV ECHO MEAS - QP/QS: 1.6
BH CV ECHO MEAS - RAP SYSTOLE: 3 MMHG
BH CV ECHO MEAS - RV MAX PG: 2.6 MMHG
BH CV ECHO MEAS - RV MEAN PG: 1.4 MMHG
BH CV ECHO MEAS - RV V1 MAX: 81.1 CM/SEC
BH CV ECHO MEAS - RV V1 MEAN: 55.2 CM/SEC
BH CV ECHO MEAS - RV V1 VTI: 19.1 CM
BH CV ECHO MEAS - RVDD: 2.9 CM
BH CV ECHO MEAS - RVOT AREA: 7.1 CM^2
BH CV ECHO MEAS - RVOT DIAM: 3 CM
BH CV ECHO MEAS - RVSP: 58.4 MMHG
BH CV ECHO MEAS - SI(AO): 173.3 ML/M^2
BH CV ECHO MEAS - SI(CUBED): 72.3 ML/M^2
BH CV ECHO MEAS - SI(LVOT): 49.5 ML/M^2
BH CV ECHO MEAS - SI(MOD-SP4): 31.2 ML/M^2
BH CV ECHO MEAS - SI(TEICH): 56.8 ML/M^2
BH CV ECHO MEAS - SV(AO): 290.5 ML
BH CV ECHO MEAS - SV(CUBED): 121.2 ML
BH CV ECHO MEAS - SV(LVOT): 83 ML
BH CV ECHO MEAS - SV(MOD-SP4): 52.3 ML
BH CV ECHO MEAS - SV(RVOT): 134.9 ML
BH CV ECHO MEAS - SV(TEICH): 95.2 ML
BH CV ECHO MEAS - TR MAX VEL: 372.1 CM/SEC
BUN BLD-MCNC: 18 MG/DL (ref 8–23)
BUN/CREAT SERPL: 4.8 (ref 7–25)
CALCIUM SPEC-SCNC: 8.4 MG/DL (ref 8.6–10.5)
CHLORIDE SERPL-SCNC: 98 MMOL/L (ref 98–107)
CO2 SERPL-SCNC: 24 MMOL/L (ref 22–29)
CREAT BLD-MCNC: 3.73 MG/DL (ref 0.57–1)
DEPRECATED RDW RBC AUTO: 46.4 FL (ref 37–54)
EOSINOPHIL # BLD AUTO: 0.2 10*3/MM3 (ref 0–0.4)
EOSINOPHIL NFR BLD AUTO: 3.3 % (ref 0.3–6.2)
ERYTHROCYTE [DISTWIDTH] IN BLOOD BY AUTOMATED COUNT: 14.1 % (ref 12.3–15.4)
GFR SERPL CREATININE-BSD FRML MDRD: 12 ML/MIN/1.73
GFR SERPL CREATININE-BSD FRML MDRD: ABNORMAL ML/MIN/{1.73_M2}
GLUCOSE BLD-MCNC: 89 MG/DL (ref 65–99)
HBA1C MFR BLD: 5.5 % (ref 3.5–5.6)
HCT VFR BLD AUTO: 34.5 % (ref 34–46.6)
HGB BLD-MCNC: 11.4 G/DL (ref 12–15.9)
INR PPP: 1.21 (ref 0.9–1.1)
LYMPHOCYTES # BLD AUTO: 1.8 10*3/MM3 (ref 0.7–3.1)
LYMPHOCYTES NFR BLD AUTO: 26 % (ref 19.6–45.3)
MCH RBC QN AUTO: 31.1 PG (ref 26.6–33)
MCHC RBC AUTO-ENTMCNC: 33.2 G/DL (ref 31.5–35.7)
MCV RBC AUTO: 93.7 FL (ref 79–97)
MONOCYTES # BLD AUTO: 0.7 10*3/MM3 (ref 0.1–0.9)
MONOCYTES NFR BLD AUTO: 9.3 % (ref 5–12)
NEUTROPHILS # BLD AUTO: 4.3 10*3/MM3 (ref 1.7–7)
NEUTROPHILS NFR BLD AUTO: 61 % (ref 42.7–76)
NRBC BLD AUTO-RTO: 0.1 /100 WBC (ref 0–0.2)
PLATELET # BLD AUTO: 260 10*3/MM3 (ref 140–450)
PMV BLD AUTO: 8.3 FL (ref 6–12)
POTASSIUM BLD-SCNC: 3.3 MMOL/L (ref 3.5–5.2)
PROTHROMBIN TIME: 12.3 SECONDS (ref 9.6–11.7)
RBC # BLD AUTO: 3.68 10*6/MM3 (ref 3.77–5.28)
SODIUM BLD-SCNC: 137 MMOL/L (ref 136–145)
WBC NRBC COR # BLD: 7.1 10*3/MM3 (ref 3.4–10.8)

## 2019-10-27 PROCEDURE — 80048 BASIC METABOLIC PNL TOTAL CA: CPT | Performed by: NURSE PRACTITIONER

## 2019-10-27 PROCEDURE — G0378 HOSPITAL OBSERVATION PER HR: HCPCS

## 2019-10-27 PROCEDURE — 93306 TTE W/DOPPLER COMPLETE: CPT

## 2019-10-27 PROCEDURE — 99232 SBSQ HOSP IP/OBS MODERATE 35: CPT | Performed by: INTERNAL MEDICINE

## 2019-10-27 PROCEDURE — 85025 COMPLETE CBC W/AUTO DIFF WBC: CPT | Performed by: NURSE PRACTITIONER

## 2019-10-27 PROCEDURE — 85610 PROTHROMBIN TIME: CPT | Performed by: NURSE PRACTITIONER

## 2019-10-27 PROCEDURE — 85730 THROMBOPLASTIN TIME PARTIAL: CPT | Performed by: NURSE PRACTITIONER

## 2019-10-27 PROCEDURE — 25010000002 HEPARIN (PORCINE) PER 1000 UNITS: Performed by: NURSE PRACTITIONER

## 2019-10-27 PROCEDURE — 25010000002 HYDRALAZINE PER 20 MG: Performed by: INTERNAL MEDICINE

## 2019-10-27 PROCEDURE — 0W993ZX DRAINAGE OF RIGHT PLEURAL CAVITY, PERCUTANEOUS APPROACH, DIAGNOSTIC: ICD-10-PCS | Performed by: RADIOLOGY

## 2019-10-27 PROCEDURE — 93306 TTE W/DOPPLER COMPLETE: CPT | Performed by: INTERNAL MEDICINE

## 2019-10-27 RX ORDER — HEPARIN SODIUM 5000 [USP'U]/ML
5000 INJECTION, SOLUTION INTRAVENOUS; SUBCUTANEOUS EVERY 12 HOURS SCHEDULED
Status: DISCONTINUED | OUTPATIENT
Start: 2019-10-28 | End: 2019-11-03 | Stop reason: HOSPADM

## 2019-10-27 RX ADMIN — BUPROPION HYDROCHLORIDE 150 MG: 150 TABLET, EXTENDED RELEASE ORAL at 08:41

## 2019-10-27 RX ADMIN — HEPARIN SODIUM 5000 UNITS: 5000 INJECTION INTRAVENOUS; SUBCUTANEOUS at 10:53

## 2019-10-27 RX ADMIN — PREGABALIN 150 MG: 75 CAPSULE ORAL at 21:26

## 2019-10-27 RX ADMIN — LANTHANUM CARBONATE 1000 MG: 500 TABLET, CHEWABLE ORAL at 12:19

## 2019-10-27 RX ADMIN — DICYCLOMINE HYDROCHLORIDE 10 MG: 10 CAPSULE ORAL at 10:53

## 2019-10-27 RX ADMIN — LEVETIRACETAM 500 MG: 500 TABLET ORAL at 21:26

## 2019-10-27 RX ADMIN — HYDROCODONE BITARTRATE AND ACETAMINOPHEN 1 TABLET: 10; 325 TABLET ORAL at 05:15

## 2019-10-27 RX ADMIN — HYDRALAZINE HYDROCHLORIDE 10 MG: 20 INJECTION INTRAMUSCULAR; INTRAVENOUS at 21:26

## 2019-10-27 RX ADMIN — HYDROCODONE BITARTRATE AND ACETAMINOPHEN 1 TABLET: 10; 325 TABLET ORAL at 13:33

## 2019-10-27 RX ADMIN — DICYCLOMINE HYDROCHLORIDE 10 MG: 10 CAPSULE ORAL at 21:26

## 2019-10-27 RX ADMIN — Medication 10 ML: at 21:27

## 2019-10-27 RX ADMIN — LEVETIRACETAM 500 MG: 500 TABLET ORAL at 10:54

## 2019-10-27 RX ADMIN — ASPIRIN 81 MG: 81 TABLET, COATED ORAL at 08:41

## 2019-10-27 RX ADMIN — HYDROCODONE BITARTRATE AND ACETAMINOPHEN 1 TABLET: 10; 325 TABLET ORAL at 20:00

## 2019-10-27 RX ADMIN — CYANOCOBALAMIN TAB 1000 MCG 1000 MCG: 1000 TAB at 10:54

## 2019-10-27 RX ADMIN — ALPRAZOLAM 1 MG: 1 TABLET ORAL at 21:26

## 2019-10-28 ENCOUNTER — APPOINTMENT (OUTPATIENT)
Dept: CT IMAGING | Facility: HOSPITAL | Age: 64
End: 2019-10-28

## 2019-10-28 ENCOUNTER — APPOINTMENT (OUTPATIENT)
Dept: INTERVENTIONAL RADIOLOGY/VASCULAR | Facility: HOSPITAL | Age: 64
End: 2019-10-28

## 2019-10-28 ENCOUNTER — APPOINTMENT (OUTPATIENT)
Dept: GENERAL RADIOLOGY | Facility: HOSPITAL | Age: 64
End: 2019-10-28

## 2019-10-28 LAB
ANION GAP SERPL CALCULATED.3IONS-SCNC: 13 MMOL/L (ref 5–15)
APPEARANCE FLD: ABNORMAL
APTT PPP: 26.4 SECONDS (ref 24–31)
BASOPHILS # BLD AUTO: 0 10*3/MM3 (ref 0–0.2)
BASOPHILS NFR BLD AUTO: 0.7 % (ref 0–1.5)
BUN BLD-MCNC: 31 MG/DL (ref 8–23)
BUN/CREAT SERPL: 5.7 (ref 7–25)
CALCIUM SPEC-SCNC: 8.3 MG/DL (ref 8.6–10.5)
CHLORIDE SERPL-SCNC: 99 MMOL/L (ref 98–107)
CO2 SERPL-SCNC: 26 MMOL/L (ref 22–29)
COLOR FLD: YELLOW
CREAT BLD-MCNC: 5.47 MG/DL (ref 0.57–1)
DEPRECATED RDW RBC AUTO: 48.6 FL (ref 37–54)
EOSINOPHIL # BLD AUTO: 0.3 10*3/MM3 (ref 0–0.4)
EOSINOPHIL NFR BLD AUTO: 4.4 % (ref 0.3–6.2)
ERYTHROCYTE [DISTWIDTH] IN BLOOD BY AUTOMATED COUNT: 14.5 % (ref 12.3–15.4)
GFR SERPL CREATININE-BSD FRML MDRD: 8 ML/MIN/1.73
GFR SERPL CREATININE-BSD FRML MDRD: ABNORMAL ML/MIN/{1.73_M2}
GLUCOSE BLD-MCNC: 86 MG/DL (ref 65–99)
HCT VFR BLD AUTO: 32.8 % (ref 34–46.6)
HGB BLD-MCNC: 11.1 G/DL (ref 12–15.9)
INR PPP: 1.06 (ref 0.9–1.1)
LYMPHOCYTES # BLD AUTO: 1.8 10*3/MM3 (ref 0.7–3.1)
LYMPHOCYTES NFR BLD AUTO: 27 % (ref 19.6–45.3)
LYMPHOCYTES NFR FLD MANUAL: 39 %
MACROPHAGE FLUID: 8 %
MCH RBC QN AUTO: 32.1 PG (ref 26.6–33)
MCHC RBC AUTO-ENTMCNC: 33.8 G/DL (ref 31.5–35.7)
MCV RBC AUTO: 95 FL (ref 79–97)
MESOTHL CELL NFR FLD MANUAL: 15 %
METHOD: ABNORMAL
MONOCYTES # BLD AUTO: 0.7 10*3/MM3 (ref 0.1–0.9)
MONOCYTES NFR BLD AUTO: 11 % (ref 5–12)
MONOCYTES NFR FLD: 18 %
NEUTROPHILS # BLD AUTO: 3.8 10*3/MM3 (ref 1.7–7)
NEUTROPHILS NFR BLD AUTO: 56.9 % (ref 42.7–76)
NEUTROPHILS NFR FLD MANUAL: 18 %
NRBC BLD AUTO-RTO: 0 /100 WBC (ref 0–0.2)
NUC CELL # FLD: 581 /MM3
PATHOLOGY REVIEW: YES
PLATELET # BLD AUTO: 240 10*3/MM3 (ref 140–450)
PMV BLD AUTO: 8.1 FL (ref 6–12)
POTASSIUM BLD-SCNC: 3.8 MMOL/L (ref 3.5–5.2)
PROCALCITONIN SERPL-MCNC: 0.46 NG/ML (ref 0.1–0.25)
PROTHROMBIN TIME: 11 SECONDS (ref 9.6–11.7)
RBC # BLD AUTO: 3.45 10*6/MM3 (ref 3.77–5.28)
SODIUM BLD-SCNC: 138 MMOL/L (ref 136–145)
TROPONIN T SERPL-MCNC: 0.04 NG/ML (ref 0–0.03)
TROPONIN T SERPL-MCNC: 0.07 NG/ML (ref 0–0.03)
UNCLASSIFIED CELLS, FLUID: 2 %
WBC NRBC COR # BLD: 6.7 10*3/MM3 (ref 3.4–10.8)

## 2019-10-28 PROCEDURE — 85025 COMPLETE CBC W/AUTO DIFF WBC: CPT | Performed by: INTERNAL MEDICINE

## 2019-10-28 PROCEDURE — 87206 SMEAR FLUORESCENT/ACID STAI: CPT | Performed by: INTERNAL MEDICINE

## 2019-10-28 PROCEDURE — 84145 PROCALCITONIN (PCT): CPT | Performed by: FAMILY MEDICINE

## 2019-10-28 PROCEDURE — 89051 BODY FLUID CELL COUNT: CPT | Performed by: INTERNAL MEDICINE

## 2019-10-28 PROCEDURE — 87015 SPECIMEN INFECT AGNT CONCNTJ: CPT | Performed by: INTERNAL MEDICINE

## 2019-10-28 PROCEDURE — C1729 CATH, DRAINAGE: HCPCS

## 2019-10-28 PROCEDURE — 25010000002 HEPARIN (PORCINE) PER 1000 UNITS: Performed by: INTERNAL MEDICINE

## 2019-10-28 PROCEDURE — 88108 CYTOPATH CONCENTRATE TECH: CPT | Performed by: INTERNAL MEDICINE

## 2019-10-28 PROCEDURE — 82945 GLUCOSE OTHER FLUID: CPT | Performed by: INTERNAL MEDICINE

## 2019-10-28 PROCEDURE — 85730 THROMBOPLASTIN TIME PARTIAL: CPT | Performed by: INTERNAL MEDICINE

## 2019-10-28 PROCEDURE — 87205 SMEAR GRAM STAIN: CPT | Performed by: INTERNAL MEDICINE

## 2019-10-28 PROCEDURE — 87070 CULTURE OTHR SPECIMN AEROBIC: CPT | Performed by: INTERNAL MEDICINE

## 2019-10-28 PROCEDURE — 70450 CT HEAD/BRAIN W/O DYE: CPT

## 2019-10-28 PROCEDURE — 85610 PROTHROMBIN TIME: CPT | Performed by: INTERNAL MEDICINE

## 2019-10-28 PROCEDURE — 99232 SBSQ HOSP IP/OBS MODERATE 35: CPT | Performed by: FAMILY MEDICINE

## 2019-10-28 PROCEDURE — G0378 HOSPITAL OBSERVATION PER HR: HCPCS

## 2019-10-28 PROCEDURE — 71045 X-RAY EXAM CHEST 1 VIEW: CPT

## 2019-10-28 PROCEDURE — 87116 MYCOBACTERIA CULTURE: CPT | Performed by: INTERNAL MEDICINE

## 2019-10-28 PROCEDURE — 84484 ASSAY OF TROPONIN QUANT: CPT | Performed by: FAMILY MEDICINE

## 2019-10-28 PROCEDURE — 80048 BASIC METABOLIC PNL TOTAL CA: CPT | Performed by: INTERNAL MEDICINE

## 2019-10-28 PROCEDURE — 76942 ECHO GUIDE FOR BIOPSY: CPT

## 2019-10-28 PROCEDURE — 87102 FUNGUS ISOLATION CULTURE: CPT | Performed by: INTERNAL MEDICINE

## 2019-10-28 PROCEDURE — 71046 X-RAY EXAM CHEST 2 VIEWS: CPT

## 2019-10-28 RX ADMIN — HYDROCODONE BITARTRATE AND ACETAMINOPHEN 1 TABLET: 10; 325 TABLET ORAL at 15:55

## 2019-10-28 RX ADMIN — HYDROCODONE BITARTRATE AND ACETAMINOPHEN 1 TABLET: 10; 325 TABLET ORAL at 08:13

## 2019-10-28 RX ADMIN — LOPERAMIDE HYDROCHLORIDE 2 MG: 2 CAPSULE ORAL at 20:55

## 2019-10-28 RX ADMIN — Medication 10 ML: at 08:17

## 2019-10-28 RX ADMIN — DICYCLOMINE HYDROCHLORIDE 10 MG: 10 CAPSULE ORAL at 20:55

## 2019-10-28 RX ADMIN — CYANOCOBALAMIN TAB 1000 MCG 1000 MCG: 1000 TAB at 08:13

## 2019-10-28 RX ADMIN — HEPARIN SODIUM 5000 UNITS: 5000 INJECTION INTRAVENOUS; SUBCUTANEOUS at 20:55

## 2019-10-28 RX ADMIN — ACETAMINOPHEN 650 MG: 325 TABLET ORAL at 12:35

## 2019-10-28 RX ADMIN — Medication 1 TABLET: at 08:18

## 2019-10-28 RX ADMIN — BUPROPION HYDROCHLORIDE 150 MG: 150 TABLET, EXTENDED RELEASE ORAL at 08:13

## 2019-10-28 RX ADMIN — LEVETIRACETAM 500 MG: 500 TABLET ORAL at 08:16

## 2019-10-28 RX ADMIN — DICYCLOMINE HYDROCHLORIDE 10 MG: 10 CAPSULE ORAL at 08:14

## 2019-10-28 RX ADMIN — Medication 10 ML: at 20:56

## 2019-10-28 RX ADMIN — PREGABALIN 150 MG: 75 CAPSULE ORAL at 20:55

## 2019-10-28 RX ADMIN — LANTHANUM CARBONATE 1000 MG: 500 TABLET, CHEWABLE ORAL at 17:19

## 2019-10-28 RX ADMIN — LEVETIRACETAM 500 MG: 500 TABLET ORAL at 20:55

## 2019-10-29 PROBLEM — J90 LARGE PLEURAL EFFUSION: Status: ACTIVE | Noted: 2019-10-29

## 2019-10-29 LAB
ALBUMIN SERPL-MCNC: 3.6 G/DL (ref 3.5–5.2)
ANION GAP SERPL CALCULATED.3IONS-SCNC: 15 MMOL/L (ref 5–15)
BASOPHILS # BLD AUTO: 0 10*3/MM3 (ref 0–0.2)
BASOPHILS NFR BLD AUTO: 0.3 % (ref 0–1.5)
BUN BLD-MCNC: 41 MG/DL (ref 8–23)
BUN/CREAT SERPL: 6.5 (ref 7–25)
CALCIUM SPEC-SCNC: 8 MG/DL (ref 8.6–10.5)
CHLORIDE SERPL-SCNC: 103 MMOL/L (ref 98–107)
CO2 SERPL-SCNC: 23 MMOL/L (ref 22–29)
CREAT BLD-MCNC: 6.29 MG/DL (ref 0.57–1)
DEPRECATED RDW RBC AUTO: 49 FL (ref 37–54)
EOSINOPHIL # BLD AUTO: 0.2 10*3/MM3 (ref 0–0.4)
EOSINOPHIL NFR BLD AUTO: 3 % (ref 0.3–6.2)
ERYTHROCYTE [DISTWIDTH] IN BLOOD BY AUTOMATED COUNT: 14.4 % (ref 12.3–15.4)
GFR SERPL CREATININE-BSD FRML MDRD: 7 ML/MIN/1.73
GFR SERPL CREATININE-BSD FRML MDRD: ABNORMAL ML/MIN/{1.73_M2}
GLUCOSE BLD-MCNC: 105 MG/DL (ref 65–99)
GLUCOSE FLD-MCNC: 101 MG/DL
HCT VFR BLD AUTO: 33.7 % (ref 34–46.6)
HGB BLD-MCNC: 10.9 G/DL (ref 12–15.9)
LAB AP CASE REPORT: NORMAL
LYMPHOCYTES # BLD AUTO: 1.3 10*3/MM3 (ref 0.7–3.1)
LYMPHOCYTES NFR BLD AUTO: 17 % (ref 19.6–45.3)
MCH RBC QN AUTO: 31 PG (ref 26.6–33)
MCHC RBC AUTO-ENTMCNC: 32.3 G/DL (ref 31.5–35.7)
MCV RBC AUTO: 95.8 FL (ref 79–97)
MONOCYTES # BLD AUTO: 0.6 10*3/MM3 (ref 0.1–0.9)
MONOCYTES NFR BLD AUTO: 8 % (ref 5–12)
NEUTROPHILS # BLD AUTO: 5.3 10*3/MM3 (ref 1.7–7)
NEUTROPHILS NFR BLD AUTO: 71.7 % (ref 42.7–76)
NRBC BLD AUTO-RTO: 0 /100 WBC (ref 0–0.2)
PATH REPORT.FINAL DX SPEC: NORMAL
PHOSPHATE SERPL-MCNC: 4.3 MG/DL (ref 2.5–4.5)
PLATELET # BLD AUTO: 251 10*3/MM3 (ref 140–450)
PMV BLD AUTO: 8.3 FL (ref 6–12)
POTASSIUM BLD-SCNC: 4.1 MMOL/L (ref 3.5–5.2)
RBC # BLD AUTO: 3.52 10*6/MM3 (ref 3.77–5.28)
SODIUM BLD-SCNC: 141 MMOL/L (ref 136–145)
WBC NRBC COR # BLD: 7.4 10*3/MM3 (ref 3.4–10.8)

## 2019-10-29 PROCEDURE — 80069 RENAL FUNCTION PANEL: CPT | Performed by: INTERNAL MEDICINE

## 2019-10-29 PROCEDURE — 99232 SBSQ HOSP IP/OBS MODERATE 35: CPT | Performed by: FAMILY MEDICINE

## 2019-10-29 PROCEDURE — 85025 COMPLETE CBC W/AUTO DIFF WBC: CPT | Performed by: FAMILY MEDICINE

## 2019-10-29 PROCEDURE — 5A1D70Z PERFORMANCE OF URINARY FILTRATION, INTERMITTENT, LESS THAN 6 HOURS PER DAY: ICD-10-PCS | Performed by: INTERNAL MEDICINE

## 2019-10-29 PROCEDURE — 25010000002 HEPARIN (PORCINE) PER 1000 UNITS: Performed by: INTERNAL MEDICINE

## 2019-10-29 PROCEDURE — 99222 1ST HOSP IP/OBS MODERATE 55: CPT | Performed by: INTERNAL MEDICINE

## 2019-10-29 RX ADMIN — PREGABALIN 150 MG: 75 CAPSULE ORAL at 20:53

## 2019-10-29 RX ADMIN — DICYCLOMINE HYDROCHLORIDE 10 MG: 10 CAPSULE ORAL at 13:02

## 2019-10-29 RX ADMIN — LEVETIRACETAM 500 MG: 500 TABLET ORAL at 20:53

## 2019-10-29 RX ADMIN — CYANOCOBALAMIN TAB 1000 MCG 1000 MCG: 1000 TAB at 13:05

## 2019-10-29 RX ADMIN — LEVETIRACETAM 500 MG: 500 TABLET ORAL at 15:25

## 2019-10-29 RX ADMIN — LOPERAMIDE HYDROCHLORIDE 2 MG: 2 CAPSULE ORAL at 13:05

## 2019-10-29 RX ADMIN — HYDROCODONE BITARTRATE AND ACETAMINOPHEN 1 TABLET: 10; 325 TABLET ORAL at 18:10

## 2019-10-29 RX ADMIN — LOPERAMIDE HYDROCHLORIDE 2 MG: 2 CAPSULE ORAL at 20:02

## 2019-10-29 RX ADMIN — LANTHANUM CARBONATE 1000 MG: 500 TABLET, CHEWABLE ORAL at 13:20

## 2019-10-29 RX ADMIN — ASPIRIN 81 MG: 81 TABLET, COATED ORAL at 13:05

## 2019-10-29 RX ADMIN — BUPROPION HYDROCHLORIDE 150 MG: 150 TABLET, EXTENDED RELEASE ORAL at 13:01

## 2019-10-29 RX ADMIN — Medication 10 ML: at 20:55

## 2019-10-29 RX ADMIN — HYDROCODONE BITARTRATE AND ACETAMINOPHEN 1 TABLET: 10; 325 TABLET ORAL at 03:30

## 2019-10-29 RX ADMIN — Medication 10 ML: at 13:06

## 2019-10-29 RX ADMIN — DICYCLOMINE HYDROCHLORIDE 10 MG: 10 CAPSULE ORAL at 20:53

## 2019-10-29 RX ADMIN — LANTHANUM CARBONATE 1000 MG: 500 TABLET, CHEWABLE ORAL at 18:16

## 2019-10-29 RX ADMIN — HEPARIN SODIUM 5000 UNITS: 5000 INJECTION INTRAVENOUS; SUBCUTANEOUS at 13:05

## 2019-10-29 RX ADMIN — METOPROLOL TARTRATE 12.5 MG: 25 TABLET ORAL at 18:16

## 2019-10-29 RX ADMIN — HYDROCODONE BITARTRATE AND ACETAMINOPHEN 1 TABLET: 10; 325 TABLET ORAL at 13:04

## 2019-10-29 RX ADMIN — HEPARIN SODIUM 5000 UNITS: 5000 INJECTION INTRAVENOUS; SUBCUTANEOUS at 20:53

## 2019-10-29 RX ADMIN — Medication 1 TABLET: at 13:04

## 2019-10-30 PROBLEM — I34.0 MITRAL REGURGITATION: Status: ACTIVE | Noted: 2019-10-30

## 2019-10-30 LAB
ALBUMIN SERPL-MCNC: 3.2 G/DL (ref 3.5–5.2)
ANION GAP SERPL CALCULATED.3IONS-SCNC: 11 MMOL/L (ref 5–15)
BASOPHILS # BLD AUTO: 0 10*3/MM3 (ref 0–0.2)
BASOPHILS NFR BLD AUTO: 0.4 % (ref 0–1.5)
BUN BLD-MCNC: 22 MG/DL (ref 8–23)
BUN/CREAT SERPL: 5.6 (ref 7–25)
CALCIUM SPEC-SCNC: 8.4 MG/DL (ref 8.6–10.5)
CHLORIDE SERPL-SCNC: 103 MMOL/L (ref 98–107)
CO2 SERPL-SCNC: 28 MMOL/L (ref 22–29)
CREAT BLD-MCNC: 3.9 MG/DL (ref 0.57–1)
DEPRECATED RDW RBC AUTO: 49.4 FL (ref 37–54)
EOSINOPHIL # BLD AUTO: 0.2 10*3/MM3 (ref 0–0.4)
EOSINOPHIL NFR BLD AUTO: 3.2 % (ref 0.3–6.2)
ERYTHROCYTE [DISTWIDTH] IN BLOOD BY AUTOMATED COUNT: 14.6 % (ref 12.3–15.4)
GFR SERPL CREATININE-BSD FRML MDRD: 12 ML/MIN/1.73
GFR SERPL CREATININE-BSD FRML MDRD: ABNORMAL ML/MIN/{1.73_M2}
GIE STN SPEC: NORMAL
GLUCOSE BLD-MCNC: 123 MG/DL (ref 65–99)
HCT VFR BLD AUTO: 31.8 % (ref 34–46.6)
HGB BLD-MCNC: 10.5 G/DL (ref 12–15.9)
LAB AP CASE REPORT: NORMAL
LYMPHOCYTES # BLD AUTO: 1.9 10*3/MM3 (ref 0.7–3.1)
LYMPHOCYTES NFR BLD AUTO: 24.1 % (ref 19.6–45.3)
MCH RBC QN AUTO: 31.3 PG (ref 26.6–33)
MCHC RBC AUTO-ENTMCNC: 32.9 G/DL (ref 31.5–35.7)
MCV RBC AUTO: 95.2 FL (ref 79–97)
MONOCYTES # BLD AUTO: 0.8 10*3/MM3 (ref 0.1–0.9)
MONOCYTES NFR BLD AUTO: 9.6 % (ref 5–12)
NEUTROPHILS # BLD AUTO: 4.9 10*3/MM3 (ref 1.7–7)
NEUTROPHILS NFR BLD AUTO: 62.7 % (ref 42.7–76)
NRBC BLD AUTO-RTO: 0 /100 WBC (ref 0–0.2)
PATH REPORT.FINAL DX SPEC: NORMAL
PATH REPORT.GROSS SPEC: NORMAL
PHOSPHATE SERPL-MCNC: 3.3 MG/DL (ref 2.5–4.5)
PLATELET # BLD AUTO: 229 10*3/MM3 (ref 140–450)
PMV BLD AUTO: 8.5 FL (ref 6–12)
POTASSIUM BLD-SCNC: 4.5 MMOL/L (ref 3.5–5.2)
RBC # BLD AUTO: 3.34 10*6/MM3 (ref 3.77–5.28)
SODIUM BLD-SCNC: 142 MMOL/L (ref 136–145)
WBC NRBC COR # BLD: 7.8 10*3/MM3 (ref 3.4–10.8)

## 2019-10-30 PROCEDURE — 25010000002 HEPARIN (PORCINE) PER 1000 UNITS: Performed by: INTERNAL MEDICINE

## 2019-10-30 PROCEDURE — 99232 SBSQ HOSP IP/OBS MODERATE 35: CPT | Performed by: INTERNAL MEDICINE

## 2019-10-30 PROCEDURE — 80069 RENAL FUNCTION PANEL: CPT | Performed by: INTERNAL MEDICINE

## 2019-10-30 PROCEDURE — 85025 COMPLETE CBC W/AUTO DIFF WBC: CPT | Performed by: FAMILY MEDICINE

## 2019-10-30 PROCEDURE — 99232 SBSQ HOSP IP/OBS MODERATE 35: CPT | Performed by: FAMILY MEDICINE

## 2019-10-30 PROCEDURE — 25010000002 ONDANSETRON PER 1 MG: Performed by: NURSE PRACTITIONER

## 2019-10-30 RX ORDER — LOPERAMIDE HYDROCHLORIDE 2 MG/1
4 CAPSULE ORAL 4 TIMES DAILY PRN
Status: DISCONTINUED | OUTPATIENT
Start: 2019-10-30 | End: 2019-11-03 | Stop reason: HOSPADM

## 2019-10-30 RX ADMIN — HYDROCODONE BITARTRATE AND ACETAMINOPHEN 1 TABLET: 10; 325 TABLET ORAL at 16:39

## 2019-10-30 RX ADMIN — HYDROCODONE BITARTRATE AND ACETAMINOPHEN 1 TABLET: 10; 325 TABLET ORAL at 09:44

## 2019-10-30 RX ADMIN — LOPERAMIDE HYDROCHLORIDE 4 MG: 2 CAPSULE ORAL at 21:46

## 2019-10-30 RX ADMIN — LANTHANUM CARBONATE 1000 MG: 500 TABLET, CHEWABLE ORAL at 12:10

## 2019-10-30 RX ADMIN — Medication 10 ML: at 09:49

## 2019-10-30 RX ADMIN — METOPROLOL TARTRATE 12.5 MG: 25 TABLET ORAL at 20:25

## 2019-10-30 RX ADMIN — ONDANSETRON 4 MG: 2 INJECTION INTRAMUSCULAR; INTRAVENOUS at 09:38

## 2019-10-30 RX ADMIN — HYDROCODONE BITARTRATE AND ACETAMINOPHEN 1 TABLET: 10; 325 TABLET ORAL at 21:46

## 2019-10-30 RX ADMIN — ASPIRIN 81 MG: 81 TABLET, COATED ORAL at 09:38

## 2019-10-30 RX ADMIN — METOPROLOL TARTRATE 12.5 MG: 25 TABLET ORAL at 09:39

## 2019-10-30 RX ADMIN — LEVETIRACETAM 500 MG: 500 TABLET ORAL at 09:39

## 2019-10-30 RX ADMIN — LANTHANUM CARBONATE 1000 MG: 500 TABLET, CHEWABLE ORAL at 09:47

## 2019-10-30 RX ADMIN — Medication 10 ML: at 20:27

## 2019-10-30 RX ADMIN — BUPROPION HYDROCHLORIDE 150 MG: 150 TABLET, EXTENDED RELEASE ORAL at 09:39

## 2019-10-30 RX ADMIN — DICYCLOMINE HYDROCHLORIDE 10 MG: 10 CAPSULE ORAL at 09:38

## 2019-10-30 RX ADMIN — LOPERAMIDE HYDROCHLORIDE 4 MG: 2 CAPSULE ORAL at 16:39

## 2019-10-30 RX ADMIN — LOPERAMIDE HYDROCHLORIDE 4 MG: 2 CAPSULE ORAL at 12:10

## 2019-10-30 RX ADMIN — PREGABALIN 150 MG: 75 CAPSULE ORAL at 20:27

## 2019-10-30 RX ADMIN — HEPARIN SODIUM 5000 UNITS: 5000 INJECTION INTRAVENOUS; SUBCUTANEOUS at 09:38

## 2019-10-30 RX ADMIN — Medication 1 TABLET: at 09:39

## 2019-10-30 RX ADMIN — LEVETIRACETAM 500 MG: 500 TABLET ORAL at 20:25

## 2019-10-30 RX ADMIN — ONDANSETRON 4 MG: 2 INJECTION INTRAMUSCULAR; INTRAVENOUS at 20:01

## 2019-10-30 RX ADMIN — HEPARIN SODIUM 5000 UNITS: 5000 INJECTION INTRAVENOUS; SUBCUTANEOUS at 20:24

## 2019-10-30 RX ADMIN — CYANOCOBALAMIN TAB 1000 MCG 1000 MCG: 1000 TAB at 09:38

## 2019-10-30 RX ADMIN — LANTHANUM CARBONATE 1000 MG: 500 TABLET, CHEWABLE ORAL at 16:39

## 2019-10-30 RX ADMIN — DICYCLOMINE HYDROCHLORIDE 10 MG: 10 CAPSULE ORAL at 20:24

## 2019-10-31 ENCOUNTER — APPOINTMENT (OUTPATIENT)
Dept: NUCLEAR MEDICINE | Facility: HOSPITAL | Age: 64
End: 2019-10-31

## 2019-10-31 LAB
ALBUMIN SERPL-MCNC: 3.5 G/DL (ref 3.5–5.2)
ANION GAP SERPL CALCULATED.3IONS-SCNC: 10 MMOL/L (ref 5–15)
BH CV NUCLEAR PRIOR STUDY: 3
BH CV STRESS BP STAGE 1: NORMAL
BH CV STRESS BP STAGE 2: NORMAL
BH CV STRESS BP STAGE 3: NORMAL
BH CV STRESS BP STAGE 4: NORMAL
BH CV STRESS COMMENTS STAGE 1: NORMAL
BH CV STRESS COMMENTS STAGE 2: NORMAL
BH CV STRESS DOSE REGADENOSON STAGE 1: 0.4
BH CV STRESS DURATION MIN STAGE 1: 0
BH CV STRESS DURATION MIN STAGE 2: 4
BH CV STRESS DURATION SEC STAGE 1: 10
BH CV STRESS DURATION SEC STAGE 2: 0
BH CV STRESS HR STAGE 1: 64
BH CV STRESS HR STAGE 2: 63
BH CV STRESS HR STAGE 3: 68
BH CV STRESS HR STAGE 4: 70
BH CV STRESS PROTOCOL 1: NORMAL
BH CV STRESS RECOVERY BP: NORMAL MMHG
BH CV STRESS RECOVERY HR: 71 BPM
BH CV STRESS STAGE 1: 1
BH CV STRESS STAGE 2: 2
BH CV STRESS STAGE 3: 3
BH CV STRESS STAGE 4: 4
BUN BLD-MCNC: 45 MG/DL (ref 8–23)
BUN/CREAT SERPL: 9.5 (ref 7–25)
CALCIUM SPEC-SCNC: 8.6 MG/DL (ref 8.6–10.5)
CHLORIDE SERPL-SCNC: 102 MMOL/L (ref 98–107)
CO2 SERPL-SCNC: 28 MMOL/L (ref 22–29)
CREAT BLD-MCNC: 4.75 MG/DL (ref 0.57–1)
GFR SERPL CREATININE-BSD FRML MDRD: 9 ML/MIN/1.73
GFR SERPL CREATININE-BSD FRML MDRD: ABNORMAL ML/MIN/{1.73_M2}
GLUCOSE BLD-MCNC: 106 MG/DL (ref 65–99)
LV EF NUC BP: 53 %
MAXIMAL PREDICTED HEART RATE: 156 BPM
PERCENT MAX PREDICTED HR: 44.87 %
PHOSPHATE SERPL-MCNC: 4.1 MG/DL (ref 2.5–4.5)
POTASSIUM BLD-SCNC: 4.8 MMOL/L (ref 3.5–5.2)
SODIUM BLD-SCNC: 140 MMOL/L (ref 136–145)
STRESS BASELINE BP: NORMAL MMHG
STRESS BASELINE HR: 63 BPM
STRESS PERCENT HR: 53 %
STRESS POST PEAK BP: NORMAL MMHG
STRESS POST PEAK HR: 70 BPM
STRESS TARGET HR: 133 BPM

## 2019-10-31 PROCEDURE — 5A1D70Z PERFORMANCE OF URINARY FILTRATION, INTERMITTENT, LESS THAN 6 HOURS PER DAY: ICD-10-PCS | Performed by: INTERNAL MEDICINE

## 2019-10-31 PROCEDURE — 25010000002 REGADENOSON 0.4 MG/5ML SOLUTION: Performed by: FAMILY MEDICINE

## 2019-10-31 PROCEDURE — 25010000002 ONDANSETRON PER 1 MG: Performed by: NURSE PRACTITIONER

## 2019-10-31 PROCEDURE — 0 TECHNETIUM SESTAMIBI: Performed by: FAMILY MEDICINE

## 2019-10-31 PROCEDURE — 93016 CV STRESS TEST SUPVJ ONLY: CPT | Performed by: NURSE PRACTITIONER

## 2019-10-31 PROCEDURE — 93018 CV STRESS TEST I&R ONLY: CPT | Performed by: INTERNAL MEDICINE

## 2019-10-31 PROCEDURE — 99232 SBSQ HOSP IP/OBS MODERATE 35: CPT | Performed by: INTERNAL MEDICINE

## 2019-10-31 PROCEDURE — 99232 SBSQ HOSP IP/OBS MODERATE 35: CPT | Performed by: FAMILY MEDICINE

## 2019-10-31 PROCEDURE — A9500 TC99M SESTAMIBI: HCPCS | Performed by: FAMILY MEDICINE

## 2019-10-31 PROCEDURE — 78452 HT MUSCLE IMAGE SPECT MULT: CPT | Performed by: INTERNAL MEDICINE

## 2019-10-31 PROCEDURE — 78452 HT MUSCLE IMAGE SPECT MULT: CPT

## 2019-10-31 PROCEDURE — 80069 RENAL FUNCTION PANEL: CPT | Performed by: INTERNAL MEDICINE

## 2019-10-31 PROCEDURE — 25010000002 HEPARIN (PORCINE) PER 1000 UNITS: Performed by: INTERNAL MEDICINE

## 2019-10-31 PROCEDURE — 93017 CV STRESS TEST TRACING ONLY: CPT

## 2019-10-31 RX ADMIN — LOPERAMIDE HYDROCHLORIDE 4 MG: 2 CAPSULE ORAL at 05:35

## 2019-10-31 RX ADMIN — TECHNETIUM TC 99M SESTAMIBI 1 DOSE: 1 INJECTION INTRAVENOUS at 09:10

## 2019-10-31 RX ADMIN — CYANOCOBALAMIN TAB 1000 MCG 1000 MCG: 1000 TAB at 12:08

## 2019-10-31 RX ADMIN — REGADENOSON 0.4 MG: 0.08 INJECTION, SOLUTION INTRAVENOUS at 10:55

## 2019-10-31 RX ADMIN — BUPROPION HYDROCHLORIDE 150 MG: 150 TABLET, EXTENDED RELEASE ORAL at 12:07

## 2019-10-31 RX ADMIN — METOPROLOL TARTRATE 12.5 MG: 25 TABLET ORAL at 12:07

## 2019-10-31 RX ADMIN — Medication 1 TABLET: at 12:27

## 2019-10-31 RX ADMIN — DICYCLOMINE HYDROCHLORIDE 10 MG: 10 CAPSULE ORAL at 12:07

## 2019-10-31 RX ADMIN — LANTHANUM CARBONATE 1000 MG: 500 TABLET, CHEWABLE ORAL at 17:45

## 2019-10-31 RX ADMIN — HYDROCODONE BITARTRATE AND ACETAMINOPHEN 1 TABLET: 10; 325 TABLET ORAL at 14:44

## 2019-10-31 RX ADMIN — ONDANSETRON 4 MG: 2 INJECTION INTRAMUSCULAR; INTRAVENOUS at 13:19

## 2019-10-31 RX ADMIN — LANTHANUM CARBONATE 1000 MG: 500 TABLET, CHEWABLE ORAL at 12:08

## 2019-10-31 RX ADMIN — ASPIRIN 81 MG: 81 TABLET, COATED ORAL at 12:07

## 2019-10-31 RX ADMIN — HYDROCODONE BITARTRATE AND ACETAMINOPHEN 1 TABLET: 10; 325 TABLET ORAL at 05:48

## 2019-10-31 RX ADMIN — Medication 10 ML: at 12:28

## 2019-10-31 RX ADMIN — LEVETIRACETAM 500 MG: 500 TABLET ORAL at 12:07

## 2019-10-31 RX ADMIN — TECHNETIUM TC 99M SESTAMIBI 1 DOSE: 1 INJECTION INTRAVENOUS at 12:10

## 2019-10-31 RX ADMIN — HEPARIN SODIUM 5000 UNITS: 5000 INJECTION INTRAVENOUS; SUBCUTANEOUS at 12:08

## 2019-11-01 PROBLEM — R94.39 ABNORMAL NUCLEAR STRESS TEST: Status: ACTIVE | Noted: 2019-10-26

## 2019-11-01 PROBLEM — I25.118 CORONARY ARTERY DISEASE OF NATIVE ARTERY OF NATIVE HEART WITH STABLE ANGINA PECTORIS (HCC): Status: ACTIVE | Noted: 2019-10-26

## 2019-11-01 LAB
ACT BLD: 164 SECONDS (ref 89–137)
ACT BLD: 180 SECONDS (ref 89–137)
ACT BLD: 224 SECONDS (ref 89–137)
ALBUMIN SERPL-MCNC: 3.5 G/DL (ref 3.5–5.2)
ANION GAP SERPL CALCULATED.3IONS-SCNC: 12 MMOL/L (ref 5–15)
ANION GAP SERPL CALCULATED.3IONS-SCNC: 8 MMOL/L (ref 5–15)
APTT PPP: 30.1 SECONDS (ref 24–31)
BASOPHILS # BLD AUTO: 0 10*3/MM3 (ref 0–0.2)
BASOPHILS # BLD AUTO: 0 10*3/MM3 (ref 0–0.2)
BASOPHILS NFR BLD AUTO: 0.2 % (ref 0–1.5)
BASOPHILS NFR BLD AUTO: 0.4 % (ref 0–1.5)
BUN BLD-MCNC: 19 MG/DL (ref 8–23)
BUN BLD-MCNC: 23 MG/DL (ref 8–23)
BUN/CREAT SERPL: 6.6 (ref 7–25)
BUN/CREAT SERPL: 6.9 (ref 7–25)
CALCIUM SPEC-SCNC: 8.7 MG/DL (ref 8.6–10.5)
CALCIUM SPEC-SCNC: 8.7 MG/DL (ref 8.6–10.5)
CHLORIDE SERPL-SCNC: 101 MMOL/L (ref 98–107)
CHLORIDE SERPL-SCNC: 99 MMOL/L (ref 98–107)
CO2 SERPL-SCNC: 28 MMOL/L (ref 22–29)
CO2 SERPL-SCNC: 29 MMOL/L (ref 22–29)
CREAT BLD-MCNC: 2.86 MG/DL (ref 0.57–1)
CREAT BLD-MCNC: 3.31 MG/DL (ref 0.57–1)
DEPRECATED RDW RBC AUTO: 49.9 FL (ref 37–54)
DEPRECATED RDW RBC AUTO: 52.5 FL (ref 37–54)
EOSINOPHIL # BLD AUTO: 0.1 10*3/MM3 (ref 0–0.4)
EOSINOPHIL # BLD AUTO: 0.2 10*3/MM3 (ref 0–0.4)
EOSINOPHIL NFR BLD AUTO: 2.2 % (ref 0.3–6.2)
EOSINOPHIL NFR BLD AUTO: 2.7 % (ref 0.3–6.2)
ERYTHROCYTE [DISTWIDTH] IN BLOOD BY AUTOMATED COUNT: 14.8 % (ref 12.3–15.4)
ERYTHROCYTE [DISTWIDTH] IN BLOOD BY AUTOMATED COUNT: 15.1 % (ref 12.3–15.4)
GFR SERPL CREATININE-BSD FRML MDRD: 14 ML/MIN/1.73
GFR SERPL CREATININE-BSD FRML MDRD: 17 ML/MIN/1.73
GFR SERPL CREATININE-BSD FRML MDRD: ABNORMAL ML/MIN/{1.73_M2}
GLUCOSE BLD-MCNC: 111 MG/DL (ref 65–99)
GLUCOSE BLD-MCNC: 156 MG/DL (ref 65–99)
HBV SURFACE AG SERPL QL IA: NORMAL
HCT VFR BLD AUTO: 31 % (ref 34–46.6)
HCT VFR BLD AUTO: 31.2 % (ref 34–46.6)
HGB BLD-MCNC: 10.1 G/DL (ref 12–15.9)
HGB BLD-MCNC: 10.3 G/DL (ref 12–15.9)
INR PPP: 1.01 (ref 0.9–1.1)
LYMPHOCYTES # BLD AUTO: 1 10*3/MM3 (ref 0.7–3.1)
LYMPHOCYTES # BLD AUTO: 1.2 10*3/MM3 (ref 0.7–3.1)
LYMPHOCYTES NFR BLD AUTO: 15.5 % (ref 19.6–45.3)
LYMPHOCYTES NFR BLD AUTO: 20.4 % (ref 19.6–45.3)
MCH RBC QN AUTO: 31.4 PG (ref 26.6–33)
MCH RBC QN AUTO: 32.3 PG (ref 26.6–33)
MCHC RBC AUTO-ENTMCNC: 32.7 G/DL (ref 31.5–35.7)
MCHC RBC AUTO-ENTMCNC: 33 G/DL (ref 31.5–35.7)
MCV RBC AUTO: 96.1 FL (ref 79–97)
MCV RBC AUTO: 97.8 FL (ref 79–97)
MONOCYTES # BLD AUTO: 0.6 10*3/MM3 (ref 0.1–0.9)
MONOCYTES # BLD AUTO: 0.6 10*3/MM3 (ref 0.1–0.9)
MONOCYTES NFR BLD AUTO: 9.2 % (ref 5–12)
MONOCYTES NFR BLD AUTO: 9.5 % (ref 5–12)
NEUTROPHILS # BLD AUTO: 4.1 10*3/MM3 (ref 1.7–7)
NEUTROPHILS # BLD AUTO: 4.5 10*3/MM3 (ref 1.7–7)
NEUTROPHILS NFR BLD AUTO: 67 % (ref 42.7–76)
NEUTROPHILS NFR BLD AUTO: 72.9 % (ref 42.7–76)
NRBC BLD AUTO-RTO: 0 /100 WBC (ref 0–0.2)
NRBC BLD AUTO-RTO: 0.1 /100 WBC (ref 0–0.2)
PHOSPHATE SERPL-MCNC: 2.8 MG/DL (ref 2.5–4.5)
PLATELET # BLD AUTO: 161 10*3/MM3 (ref 140–450)
PLATELET # BLD AUTO: 175 10*3/MM3 (ref 140–450)
PMV BLD AUTO: 8.6 FL (ref 6–12)
PMV BLD AUTO: 8.6 FL (ref 6–12)
POTASSIUM BLD-SCNC: 4.1 MMOL/L (ref 3.5–5.2)
POTASSIUM BLD-SCNC: 4.5 MMOL/L (ref 3.5–5.2)
PROTHROMBIN TIME: 10.6 SECONDS (ref 9.6–11.7)
RBC # BLD AUTO: 3.19 10*6/MM3 (ref 3.77–5.28)
RBC # BLD AUTO: 3.22 10*6/MM3 (ref 3.77–5.28)
SODIUM BLD-SCNC: 138 MMOL/L (ref 136–145)
SODIUM BLD-SCNC: 139 MMOL/L (ref 136–145)
WBC NRBC COR # BLD: 6 10*3/MM3 (ref 3.4–10.8)
WBC NRBC COR # BLD: 6.2 10*3/MM3 (ref 3.4–10.8)

## 2019-11-01 PROCEDURE — 25010000002 HEPARIN (PORCINE) PER 1000 UNITS: Performed by: INTERNAL MEDICINE

## 2019-11-01 PROCEDURE — 85730 THROMBOPLASTIN TIME PARTIAL: CPT | Performed by: INTERNAL MEDICINE

## 2019-11-01 PROCEDURE — 25010000002 ONDANSETRON PER 1 MG: Performed by: NURSE PRACTITIONER

## 2019-11-01 PROCEDURE — 80048 BASIC METABOLIC PNL TOTAL CA: CPT | Performed by: INTERNAL MEDICINE

## 2019-11-01 PROCEDURE — 93454 CORONARY ARTERY ANGIO S&I: CPT | Performed by: INTERNAL MEDICINE

## 2019-11-01 PROCEDURE — 85610 PROTHROMBIN TIME: CPT | Performed by: INTERNAL MEDICINE

## 2019-11-01 PROCEDURE — 25010000002 MIDAZOLAM PER 1 MG: Performed by: INTERNAL MEDICINE

## 2019-11-01 PROCEDURE — B2111ZZ FLUOROSCOPY OF MULTIPLE CORONARY ARTERIES USING LOW OSMOLAR CONTRAST: ICD-10-PCS | Performed by: INTERNAL MEDICINE

## 2019-11-01 PROCEDURE — 99152 MOD SED SAME PHYS/QHP 5/>YRS: CPT | Performed by: INTERNAL MEDICINE

## 2019-11-01 PROCEDURE — C9600 PERC DRUG-EL COR STENT SING: HCPCS | Performed by: INTERNAL MEDICINE

## 2019-11-01 PROCEDURE — C1769 GUIDE WIRE: HCPCS | Performed by: INTERNAL MEDICINE

## 2019-11-01 PROCEDURE — 99153 MOD SED SAME PHYS/QHP EA: CPT | Performed by: INTERNAL MEDICINE

## 2019-11-01 PROCEDURE — 027034Z DILATION OF CORONARY ARTERY, ONE ARTERY WITH DRUG-ELUTING INTRALUMINAL DEVICE, PERCUTANEOUS APPROACH: ICD-10-PCS | Performed by: INTERNAL MEDICINE

## 2019-11-01 PROCEDURE — 99232 SBSQ HOSP IP/OBS MODERATE 35: CPT | Performed by: INTERNAL MEDICINE

## 2019-11-01 PROCEDURE — 92928 PRQ TCAT PLMT NTRAC ST 1 LES: CPT | Performed by: INTERNAL MEDICINE

## 2019-11-01 PROCEDURE — 99232 SBSQ HOSP IP/OBS MODERATE 35: CPT | Performed by: FAMILY MEDICINE

## 2019-11-01 PROCEDURE — 4A023N7 MEASUREMENT OF CARDIAC SAMPLING AND PRESSURE, LEFT HEART, PERCUTANEOUS APPROACH: ICD-10-PCS | Performed by: INTERNAL MEDICINE

## 2019-11-01 PROCEDURE — C1887 CATHETER, GUIDING: HCPCS | Performed by: INTERNAL MEDICINE

## 2019-11-01 PROCEDURE — C1874 STENT, COATED/COV W/DEL SYS: HCPCS | Performed by: INTERNAL MEDICINE

## 2019-11-01 PROCEDURE — 85347 COAGULATION TIME ACTIVATED: CPT

## 2019-11-01 PROCEDURE — 25010000002 FENTANYL CITRATE (PF) 100 MCG/2ML SOLUTION: Performed by: INTERNAL MEDICINE

## 2019-11-01 PROCEDURE — 87340 HEPATITIS B SURFACE AG IA: CPT | Performed by: INTERNAL MEDICINE

## 2019-11-01 PROCEDURE — C1894 INTRO/SHEATH, NON-LASER: HCPCS | Performed by: INTERNAL MEDICINE

## 2019-11-01 PROCEDURE — 85025 COMPLETE CBC W/AUTO DIFF WBC: CPT | Performed by: INTERNAL MEDICINE

## 2019-11-01 PROCEDURE — 86706 HEP B SURFACE ANTIBODY: CPT | Performed by: INTERNAL MEDICINE

## 2019-11-01 PROCEDURE — 80069 RENAL FUNCTION PANEL: CPT | Performed by: INTERNAL MEDICINE

## 2019-11-01 PROCEDURE — 0 IOPAMIDOL PER 1 ML: Performed by: INTERNAL MEDICINE

## 2019-11-01 DEVICE — XIENCE SIERRA™ EVEROLIMUS ELUTING CORONARY STENT SYSTEM 2.50 MM X 08 MM / RAPID-EXCHANGE
Type: IMPLANTABLE DEVICE | Status: FUNCTIONAL
Brand: XIENCE SIERRA™

## 2019-11-01 RX ORDER — CLOPIDOGREL BISULFATE 75 MG/1
75 TABLET ORAL DAILY
Status: DISCONTINUED | OUTPATIENT
Start: 2019-11-02 | End: 2019-11-03 | Stop reason: HOSPADM

## 2019-11-01 RX ORDER — FENTANYL CITRATE 50 UG/ML
25 INJECTION, SOLUTION INTRAMUSCULAR; INTRAVENOUS ONCE
Status: DISCONTINUED | OUTPATIENT
Start: 2019-11-01 | End: 2019-11-01

## 2019-11-01 RX ORDER — ATROPINE SULFATE 1 MG/ML
.5-1 INJECTION, SOLUTION INTRAMUSCULAR; INTRAVENOUS; SUBCUTANEOUS
Status: ACTIVE | OUTPATIENT
Start: 2019-11-01 | End: 2019-11-02

## 2019-11-01 RX ORDER — SODIUM CHLORIDE 9 MG/ML
1-3 INJECTION, SOLUTION INTRAVENOUS CONTINUOUS
Status: DISCONTINUED | OUTPATIENT
Start: 2019-11-01 | End: 2019-11-03 | Stop reason: HOSPADM

## 2019-11-01 RX ORDER — CLOPIDOGREL BISULFATE 75 MG/1
TABLET ORAL AS NEEDED
Status: DISCONTINUED | OUTPATIENT
Start: 2019-11-01 | End: 2019-11-01 | Stop reason: HOSPADM

## 2019-11-01 RX ORDER — SODIUM CHLORIDE 9 MG/ML
75 INJECTION, SOLUTION INTRAVENOUS CONTINUOUS
Status: DISCONTINUED | OUTPATIENT
Start: 2019-11-01 | End: 2019-11-03 | Stop reason: HOSPADM

## 2019-11-01 RX ORDER — SODIUM CHLORIDE 9 MG/ML
250 INJECTION, SOLUTION INTRAVENOUS ONCE AS NEEDED
Status: DISCONTINUED | OUTPATIENT
Start: 2019-11-01 | End: 2019-11-03 | Stop reason: HOSPADM

## 2019-11-01 RX ORDER — LIDOCAINE HYDROCHLORIDE 20 MG/ML
INJECTION, SOLUTION INFILTRATION; PERINEURAL AS NEEDED
Status: DISCONTINUED | OUTPATIENT
Start: 2019-11-01 | End: 2019-11-01 | Stop reason: HOSPADM

## 2019-11-01 RX ORDER — MIDAZOLAM HYDROCHLORIDE 1 MG/ML
1 INJECTION INTRAMUSCULAR; INTRAVENOUS ONCE
Status: DISCONTINUED | OUTPATIENT
Start: 2019-11-01 | End: 2019-11-01

## 2019-11-01 RX ORDER — MIDAZOLAM HYDROCHLORIDE 1 MG/ML
INJECTION INTRAMUSCULAR; INTRAVENOUS AS NEEDED
Status: DISCONTINUED | OUTPATIENT
Start: 2019-11-01 | End: 2019-11-01 | Stop reason: HOSPADM

## 2019-11-01 RX ORDER — HEPARIN SODIUM 1000 [USP'U]/ML
INJECTION, SOLUTION INTRAVENOUS; SUBCUTANEOUS AS NEEDED
Status: DISCONTINUED | OUTPATIENT
Start: 2019-11-01 | End: 2019-11-01 | Stop reason: HOSPADM

## 2019-11-01 RX ORDER — FENTANYL CITRATE 50 UG/ML
INJECTION, SOLUTION INTRAMUSCULAR; INTRAVENOUS AS NEEDED
Status: DISCONTINUED | OUTPATIENT
Start: 2019-11-01 | End: 2019-11-01 | Stop reason: HOSPADM

## 2019-11-01 RX ADMIN — ONDANSETRON 4 MG: 2 INJECTION INTRAMUSCULAR; INTRAVENOUS at 08:52

## 2019-11-01 RX ADMIN — SODIUM CHLORIDE 1 ML/KG/HR: 0.9 INJECTION, SOLUTION INTRAVENOUS at 18:03

## 2019-11-01 RX ADMIN — PREGABALIN 150 MG: 75 CAPSULE ORAL at 20:13

## 2019-11-01 RX ADMIN — HYDROCODONE BITARTRATE AND ACETAMINOPHEN 1 TABLET: 10; 325 TABLET ORAL at 09:37

## 2019-11-01 RX ADMIN — Medication 10 ML: at 00:13

## 2019-11-01 RX ADMIN — METOPROLOL TARTRATE 12.5 MG: 25 TABLET ORAL at 00:13

## 2019-11-01 RX ADMIN — DICYCLOMINE HYDROCHLORIDE 10 MG: 10 CAPSULE ORAL at 08:50

## 2019-11-01 RX ADMIN — ASPIRIN 81 MG: 81 TABLET, COATED ORAL at 08:50

## 2019-11-01 RX ADMIN — HEPARIN SODIUM 5000 UNITS: 5000 INJECTION INTRAVENOUS; SUBCUTANEOUS at 08:51

## 2019-11-01 RX ADMIN — DICYCLOMINE HYDROCHLORIDE 10 MG: 10 CAPSULE ORAL at 00:13

## 2019-11-01 RX ADMIN — ONDANSETRON HYDROCHLORIDE 4 MG: 4 TABLET, FILM COATED ORAL at 20:13

## 2019-11-01 RX ADMIN — METOPROLOL TARTRATE 12.5 MG: 25 TABLET ORAL at 20:13

## 2019-11-01 RX ADMIN — HEPARIN SODIUM 5000 UNITS: 5000 INJECTION INTRAVENOUS; SUBCUTANEOUS at 00:13

## 2019-11-01 RX ADMIN — Medication 10 ML: at 20:22

## 2019-11-01 RX ADMIN — Medication 1 TABLET: at 08:54

## 2019-11-01 RX ADMIN — LEVETIRACETAM 500 MG: 500 TABLET ORAL at 20:13

## 2019-11-01 RX ADMIN — DICYCLOMINE HYDROCHLORIDE 10 MG: 10 CAPSULE ORAL at 20:13

## 2019-11-01 RX ADMIN — METOPROLOL TARTRATE 12.5 MG: 25 TABLET ORAL at 08:51

## 2019-11-01 RX ADMIN — LEVETIRACETAM 500 MG: 500 TABLET ORAL at 00:13

## 2019-11-01 RX ADMIN — CYANOCOBALAMIN TAB 1000 MCG 1000 MCG: 1000 TAB at 08:50

## 2019-11-01 RX ADMIN — Medication 10 ML: at 08:51

## 2019-11-01 RX ADMIN — BUPROPION HYDROCHLORIDE 150 MG: 150 TABLET, EXTENDED RELEASE ORAL at 08:50

## 2019-11-01 RX ADMIN — PREGABALIN 150 MG: 75 CAPSULE ORAL at 00:12

## 2019-11-01 RX ADMIN — HYDROCODONE BITARTRATE AND ACETAMINOPHEN 1 TABLET: 10; 325 TABLET ORAL at 20:13

## 2019-11-01 RX ADMIN — LEVETIRACETAM 500 MG: 500 TABLET ORAL at 08:50

## 2019-11-02 LAB
ALBUMIN SERPL-MCNC: 3.3 G/DL (ref 3.5–5.2)
ANION GAP SERPL CALCULATED.3IONS-SCNC: 12 MMOL/L (ref 5–15)
BACTERIA FLD CULT: NORMAL
BASOPHILS # BLD AUTO: 0 10*3/MM3 (ref 0–0.2)
BASOPHILS NFR BLD AUTO: 0.4 % (ref 0–1.5)
BUN BLD-MCNC: 32 MG/DL (ref 8–23)
BUN/CREAT SERPL: 7.6 (ref 7–25)
CALCIUM SPEC-SCNC: 8.7 MG/DL (ref 8.6–10.5)
CHLORIDE SERPL-SCNC: 103 MMOL/L (ref 98–107)
CO2 SERPL-SCNC: 23 MMOL/L (ref 22–29)
CREAT BLD-MCNC: 4.19 MG/DL (ref 0.57–1)
DEPRECATED RDW RBC AUTO: 55.1 FL (ref 37–54)
EOSINOPHIL # BLD AUTO: 0.1 10*3/MM3 (ref 0–0.4)
EOSINOPHIL NFR BLD AUTO: 1.7 % (ref 0.3–6.2)
ERYTHROCYTE [DISTWIDTH] IN BLOOD BY AUTOMATED COUNT: 15.5 % (ref 12.3–15.4)
GFR SERPL CREATININE-BSD FRML MDRD: 11 ML/MIN/1.73
GFR SERPL CREATININE-BSD FRML MDRD: ABNORMAL ML/MIN/{1.73_M2}
GLUCOSE BLD-MCNC: 120 MG/DL (ref 65–99)
GRAM STN SPEC: NORMAL
GRAM STN SPEC: NORMAL
HBV SURFACE AB SER RIA-ACNC: REACTIVE
HCT VFR BLD AUTO: 32.9 % (ref 34–46.6)
HGB BLD-MCNC: 10.6 G/DL (ref 12–15.9)
LYMPHOCYTES # BLD AUTO: 1.1 10*3/MM3 (ref 0.7–3.1)
LYMPHOCYTES NFR BLD AUTO: 19.2 % (ref 19.6–45.3)
MCH RBC QN AUTO: 32.3 PG (ref 26.6–33)
MCHC RBC AUTO-ENTMCNC: 32.3 G/DL (ref 31.5–35.7)
MCV RBC AUTO: 100 FL (ref 79–97)
MONOCYTES # BLD AUTO: 0.6 10*3/MM3 (ref 0.1–0.9)
MONOCYTES NFR BLD AUTO: 9.9 % (ref 5–12)
NEUTROPHILS # BLD AUTO: 3.9 10*3/MM3 (ref 1.7–7)
NEUTROPHILS NFR BLD AUTO: 68.8 % (ref 42.7–76)
NRBC BLD AUTO-RTO: 0.1 /100 WBC (ref 0–0.2)
PHOSPHATE SERPL-MCNC: 4.4 MG/DL (ref 2.5–4.5)
PLATELET # BLD AUTO: 155 10*3/MM3 (ref 140–450)
PMV BLD AUTO: 8.9 FL (ref 6–12)
POTASSIUM BLD-SCNC: 4.4 MMOL/L (ref 3.5–5.2)
RBC # BLD AUTO: 3.3 10*6/MM3 (ref 3.77–5.28)
SODIUM BLD-SCNC: 138 MMOL/L (ref 136–145)
WBC NRBC COR # BLD: 5.6 10*3/MM3 (ref 3.4–10.8)

## 2019-11-02 PROCEDURE — 5A1D70Z PERFORMANCE OF URINARY FILTRATION, INTERMITTENT, LESS THAN 6 HOURS PER DAY: ICD-10-PCS | Performed by: INTERNAL MEDICINE

## 2019-11-02 PROCEDURE — 99233 SBSQ HOSP IP/OBS HIGH 50: CPT | Performed by: INTERNAL MEDICINE

## 2019-11-02 PROCEDURE — 99232 SBSQ HOSP IP/OBS MODERATE 35: CPT | Performed by: FAMILY MEDICINE

## 2019-11-02 PROCEDURE — 93005 ELECTROCARDIOGRAM TRACING: CPT | Performed by: INTERNAL MEDICINE

## 2019-11-02 PROCEDURE — 85025 COMPLETE CBC W/AUTO DIFF WBC: CPT | Performed by: FAMILY MEDICINE

## 2019-11-02 PROCEDURE — 25010000002 HEPARIN (PORCINE) PER 1000 UNITS: Performed by: INTERNAL MEDICINE

## 2019-11-02 PROCEDURE — 80069 RENAL FUNCTION PANEL: CPT | Performed by: INTERNAL MEDICINE

## 2019-11-02 RX ADMIN — Medication 10 ML: at 21:42

## 2019-11-02 RX ADMIN — BUPROPION HYDROCHLORIDE 150 MG: 150 TABLET, EXTENDED RELEASE ORAL at 12:12

## 2019-11-02 RX ADMIN — CYANOCOBALAMIN TAB 1000 MCG 1000 MCG: 1000 TAB at 12:12

## 2019-11-02 RX ADMIN — ASPIRIN 81 MG: 81 TABLET, COATED ORAL at 12:13

## 2019-11-02 RX ADMIN — METOPROLOL TARTRATE 12.5 MG: 25 TABLET ORAL at 21:41

## 2019-11-02 RX ADMIN — Medication 1 TABLET: at 12:11

## 2019-11-02 RX ADMIN — DICYCLOMINE HYDROCHLORIDE 10 MG: 10 CAPSULE ORAL at 21:41

## 2019-11-02 RX ADMIN — HEPARIN SODIUM 5000 UNITS: 5000 INJECTION INTRAVENOUS; SUBCUTANEOUS at 12:13

## 2019-11-02 RX ADMIN — LEVETIRACETAM 500 MG: 500 TABLET ORAL at 21:41

## 2019-11-02 RX ADMIN — LEVETIRACETAM 500 MG: 500 TABLET ORAL at 12:12

## 2019-11-02 RX ADMIN — HEPARIN SODIUM 5000 UNITS: 5000 INJECTION INTRAVENOUS; SUBCUTANEOUS at 21:41

## 2019-11-02 RX ADMIN — DICYCLOMINE HYDROCHLORIDE 10 MG: 10 CAPSULE ORAL at 12:12

## 2019-11-02 RX ADMIN — PREGABALIN 150 MG: 75 CAPSULE ORAL at 21:41

## 2019-11-02 RX ADMIN — CLOPIDOGREL BISULFATE 75 MG: 75 TABLET ORAL at 12:11

## 2019-11-02 RX ADMIN — METOPROLOL TARTRATE 12.5 MG: 25 TABLET ORAL at 12:12

## 2019-11-03 VITALS
WEIGHT: 152.56 LBS | BODY MASS INDEX: 26.05 KG/M2 | HEIGHT: 64 IN | OXYGEN SATURATION: 99 % | HEART RATE: 58 BPM | RESPIRATION RATE: 20 BRPM | TEMPERATURE: 98.1 F | DIASTOLIC BLOOD PRESSURE: 50 MMHG | SYSTOLIC BLOOD PRESSURE: 130 MMHG

## 2019-11-03 LAB
ALBUMIN SERPL-MCNC: 3.1 G/DL (ref 3.5–5.2)
ANION GAP SERPL CALCULATED.3IONS-SCNC: 10 MMOL/L (ref 5–15)
BUN BLD-MCNC: 28 MG/DL (ref 8–23)
BUN/CREAT SERPL: 7.4 (ref 7–25)
CALCIUM SPEC-SCNC: 8.5 MG/DL (ref 8.6–10.5)
CHLORIDE SERPL-SCNC: 101 MMOL/L (ref 98–107)
CO2 SERPL-SCNC: 25 MMOL/L (ref 22–29)
CREAT BLD-MCNC: 3.8 MG/DL (ref 0.57–1)
GFR SERPL CREATININE-BSD FRML MDRD: 12 ML/MIN/1.73
GFR SERPL CREATININE-BSD FRML MDRD: ABNORMAL ML/MIN/{1.73_M2}
GLUCOSE BLD-MCNC: 130 MG/DL (ref 65–99)
PHOSPHATE SERPL-MCNC: 3.9 MG/DL (ref 2.5–4.5)
POTASSIUM BLD-SCNC: 4.6 MMOL/L (ref 3.5–5.2)
SODIUM BLD-SCNC: 136 MMOL/L (ref 136–145)

## 2019-11-03 PROCEDURE — 99238 HOSP IP/OBS DSCHRG MGMT 30/<: CPT | Performed by: FAMILY MEDICINE

## 2019-11-03 PROCEDURE — 25010000002 HEPARIN (PORCINE) PER 1000 UNITS: Performed by: INTERNAL MEDICINE

## 2019-11-03 PROCEDURE — 99233 SBSQ HOSP IP/OBS HIGH 50: CPT | Performed by: INTERNAL MEDICINE

## 2019-11-03 PROCEDURE — 25010000002 ONDANSETRON PER 1 MG: Performed by: FAMILY MEDICINE

## 2019-11-03 PROCEDURE — 80069 RENAL FUNCTION PANEL: CPT | Performed by: INTERNAL MEDICINE

## 2019-11-03 RX ORDER — CLOPIDOGREL BISULFATE 75 MG/1
75 TABLET ORAL DAILY
Qty: 30 TABLET | Refills: 0 | Status: ON HOLD | OUTPATIENT
Start: 2019-11-04 | End: 2020-03-13

## 2019-11-03 RX ORDER — LANTHANUM CARBONATE 500 MG/1
1000 TABLET, CHEWABLE ORAL
Status: DISCONTINUED | OUTPATIENT
Start: 2019-11-03 | End: 2019-11-03 | Stop reason: HOSPADM

## 2019-11-03 RX ORDER — ONDANSETRON 2 MG/ML
8 INJECTION INTRAMUSCULAR; INTRAVENOUS EVERY 6 HOURS PRN
Status: DISCONTINUED | OUTPATIENT
Start: 2019-11-03 | End: 2019-11-03 | Stop reason: HOSPADM

## 2019-11-03 RX ADMIN — Medication 1 TABLET: at 09:33

## 2019-11-03 RX ADMIN — ONDANSETRON HYDROCHLORIDE 4 MG: 4 TABLET, FILM COATED ORAL at 07:34

## 2019-11-03 RX ADMIN — DICYCLOMINE HYDROCHLORIDE 10 MG: 10 CAPSULE ORAL at 09:27

## 2019-11-03 RX ADMIN — CLOPIDOGREL BISULFATE 75 MG: 75 TABLET ORAL at 09:27

## 2019-11-03 RX ADMIN — BUPROPION HYDROCHLORIDE 150 MG: 150 TABLET, EXTENDED RELEASE ORAL at 09:27

## 2019-11-03 RX ADMIN — METOPROLOL TARTRATE 12.5 MG: 25 TABLET ORAL at 09:27

## 2019-11-03 RX ADMIN — Medication 10 ML: at 09:27

## 2019-11-03 RX ADMIN — HEPARIN SODIUM 5000 UNITS: 5000 INJECTION INTRAVENOUS; SUBCUTANEOUS at 09:27

## 2019-11-03 RX ADMIN — ASPIRIN 81 MG: 81 TABLET, COATED ORAL at 09:27

## 2019-11-03 RX ADMIN — CYANOCOBALAMIN TAB 1000 MCG 1000 MCG: 1000 TAB at 09:27

## 2019-11-03 RX ADMIN — LEVETIRACETAM 500 MG: 500 TABLET ORAL at 09:27

## 2019-11-03 RX ADMIN — ONDANSETRON 8 MG: 2 INJECTION INTRAMUSCULAR; INTRAVENOUS at 09:19

## 2019-11-04 ENCOUNTER — HOSPITAL ENCOUNTER (OUTPATIENT)
Facility: HOSPITAL | Age: 64
Setting detail: OBSERVATION
Discharge: HOME | End: 2019-11-05
Attending: INTERNAL MEDICINE | Admitting: INTERNAL MEDICINE

## 2019-11-04 ENCOUNTER — READMISSION MANAGEMENT (OUTPATIENT)
Dept: CALL CENTER | Facility: HOSPITAL | Age: 64
End: 2019-11-04

## 2019-11-04 ENCOUNTER — TRANSITIONAL CARE MANAGEMENT TELEPHONE ENCOUNTER (OUTPATIENT)
Dept: FAMILY MEDICINE CLINIC | Facility: CLINIC | Age: 64
End: 2019-11-04

## 2019-11-04 PROBLEM — R07.9 CHEST PAIN: Status: ACTIVE | Noted: 2019-11-04

## 2019-11-04 LAB — TROPONIN T SERPL-MCNC: 0.04 NG/ML (ref 0–0.03)

## 2019-11-04 PROCEDURE — G0378 HOSPITAL OBSERVATION PER HR: HCPCS

## 2019-11-04 PROCEDURE — G0379 DIRECT REFER HOSPITAL OBSERV: HCPCS

## 2019-11-04 PROCEDURE — 84484 ASSAY OF TROPONIN QUANT: CPT | Performed by: INTERNAL MEDICINE

## 2019-11-04 PROCEDURE — 96372 THER/PROPH/DIAG INJ SC/IM: CPT

## 2019-11-04 PROCEDURE — 99220 PR INITIAL OBSERVATION CARE/DAY 70 MINUTES: CPT | Performed by: INTERNAL MEDICINE

## 2019-11-04 PROCEDURE — 25010000002 ENOXAPARIN PER 10 MG: Performed by: INTERNAL MEDICINE

## 2019-11-04 RX ORDER — MELATONIN
2000 DAILY
Status: DISCONTINUED | OUTPATIENT
Start: 2019-11-05 | End: 2019-11-05 | Stop reason: HOSPADM

## 2019-11-04 RX ORDER — ONDANSETRON 4 MG/1
4 TABLET, FILM COATED ORAL EVERY 8 HOURS PRN
Status: DISCONTINUED | OUTPATIENT
Start: 2019-11-04 | End: 2019-11-04

## 2019-11-04 RX ORDER — LANOLIN ALCOHOL/MO/W.PET/CERES
1000 CREAM (GRAM) TOPICAL DAILY
Status: DISCONTINUED | OUTPATIENT
Start: 2019-11-05 | End: 2019-11-05 | Stop reason: HOSPADM

## 2019-11-04 RX ORDER — CHOLECALCIFEROL (VITAMIN D3) 125 MCG
5 CAPSULE ORAL NIGHTLY PRN
Status: DISCONTINUED | OUTPATIENT
Start: 2019-11-04 | End: 2019-11-05 | Stop reason: HOSPADM

## 2019-11-04 RX ORDER — SODIUM CHLORIDE 0.9 % (FLUSH) 0.9 %
10 SYRINGE (ML) INJECTION AS NEEDED
Status: DISCONTINUED | OUTPATIENT
Start: 2019-11-04 | End: 2019-11-05 | Stop reason: HOSPADM

## 2019-11-04 RX ORDER — DICYCLOMINE HYDROCHLORIDE 10 MG/1
10 CAPSULE ORAL 2 TIMES DAILY
Status: DISCONTINUED | OUTPATIENT
Start: 2019-11-04 | End: 2019-11-05 | Stop reason: HOSPADM

## 2019-11-04 RX ORDER — ONDANSETRON 4 MG/1
4 TABLET, FILM COATED ORAL EVERY 6 HOURS PRN
Status: DISCONTINUED | OUTPATIENT
Start: 2019-11-04 | End: 2019-11-05 | Stop reason: HOSPADM

## 2019-11-04 RX ORDER — PREGABALIN 75 MG/1
150 CAPSULE ORAL NIGHTLY PRN
Status: DISCONTINUED | OUTPATIENT
Start: 2019-11-04 | End: 2019-11-05 | Stop reason: HOSPADM

## 2019-11-04 RX ORDER — SODIUM CHLORIDE 0.9 % (FLUSH) 0.9 %
10 SYRINGE (ML) INJECTION EVERY 12 HOURS SCHEDULED
Status: DISCONTINUED | OUTPATIENT
Start: 2019-11-04 | End: 2019-11-05 | Stop reason: HOSPADM

## 2019-11-04 RX ORDER — HYDROCODONE BITARTRATE AND ACETAMINOPHEN 10; 325 MG/1; MG/1
1 TABLET ORAL EVERY 6 HOURS PRN
Status: DISCONTINUED | OUTPATIENT
Start: 2019-11-04 | End: 2019-11-05 | Stop reason: HOSPADM

## 2019-11-04 RX ORDER — CYCLOBENZAPRINE HCL 10 MG
5 TABLET ORAL DAILY PRN
Status: DISCONTINUED | OUTPATIENT
Start: 2019-11-04 | End: 2019-11-05 | Stop reason: HOSPADM

## 2019-11-04 RX ORDER — LANTHANUM CARBONATE 500 MG/1
500 TABLET, CHEWABLE ORAL
Status: DISCONTINUED | OUTPATIENT
Start: 2019-11-04 | End: 2019-11-05 | Stop reason: HOSPADM

## 2019-11-04 RX ORDER — CLOPIDOGREL BISULFATE 75 MG/1
75 TABLET ORAL DAILY
Status: DISCONTINUED | OUTPATIENT
Start: 2019-11-05 | End: 2019-11-05 | Stop reason: HOSPADM

## 2019-11-04 RX ORDER — ONDANSETRON 2 MG/ML
4 INJECTION INTRAMUSCULAR; INTRAVENOUS EVERY 6 HOURS PRN
Status: DISCONTINUED | OUTPATIENT
Start: 2019-11-04 | End: 2019-11-05 | Stop reason: HOSPADM

## 2019-11-04 RX ORDER — ALPRAZOLAM 1 MG/1
1 TABLET ORAL DAILY PRN
Status: DISCONTINUED | OUTPATIENT
Start: 2019-11-04 | End: 2019-11-05 | Stop reason: HOSPADM

## 2019-11-04 RX ORDER — NITROGLYCERIN 0.4 MG/1
0.4 TABLET SUBLINGUAL
Status: DISCONTINUED | OUTPATIENT
Start: 2019-11-04 | End: 2019-11-05 | Stop reason: HOSPADM

## 2019-11-04 RX ORDER — ACETAMINOPHEN 160 MG/5ML
650 SOLUTION ORAL EVERY 4 HOURS PRN
Status: DISCONTINUED | OUTPATIENT
Start: 2019-11-04 | End: 2019-11-05 | Stop reason: HOSPADM

## 2019-11-04 RX ORDER — ACETAMINOPHEN 650 MG/1
650 SUPPOSITORY RECTAL EVERY 4 HOURS PRN
Status: DISCONTINUED | OUTPATIENT
Start: 2019-11-04 | End: 2019-11-05 | Stop reason: HOSPADM

## 2019-11-04 RX ORDER — ACETAMINOPHEN 325 MG/1
650 TABLET ORAL EVERY 4 HOURS PRN
Status: DISCONTINUED | OUTPATIENT
Start: 2019-11-04 | End: 2019-11-05 | Stop reason: HOSPADM

## 2019-11-04 RX ORDER — ATORVASTATIN CALCIUM 20 MG/1
20 TABLET, FILM COATED ORAL NIGHTLY
Status: DISCONTINUED | OUTPATIENT
Start: 2019-11-04 | End: 2019-11-05 | Stop reason: HOSPADM

## 2019-11-04 RX ORDER — BUPROPION HYDROCHLORIDE 150 MG/1
150 TABLET ORAL DAILY
Status: DISCONTINUED | OUTPATIENT
Start: 2019-11-05 | End: 2019-11-05 | Stop reason: HOSPADM

## 2019-11-04 RX ORDER — ASPIRIN 81 MG/1
81 TABLET ORAL DAILY
Status: DISCONTINUED | OUTPATIENT
Start: 2019-11-05 | End: 2019-11-05 | Stop reason: HOSPADM

## 2019-11-04 RX ADMIN — DICYCLOMINE HYDROCHLORIDE 10 MG: 10 CAPSULE ORAL at 20:46

## 2019-11-04 RX ADMIN — ATORVASTATIN CALCIUM 20 MG: 20 TABLET, FILM COATED ORAL at 20:45

## 2019-11-04 RX ADMIN — METOPROLOL TARTRATE 12.5 MG: 25 TABLET ORAL at 20:45

## 2019-11-04 RX ADMIN — Medication 10 ML: at 20:45

## 2019-11-04 RX ADMIN — ENOXAPARIN SODIUM 30 MG: 30 INJECTION SUBCUTANEOUS at 20:46

## 2019-11-04 NOTE — OUTREACH NOTE
Prep Survey      Responses   Facility patient discharged from?  [facility]   Is patient eligible?  Yes   Discharge diagnosis  chest pain,  Left Heart Cath and coronary angiogram,  Pleural effusion   Does the patient have one of the following disease processes/diagnoses(primary or secondary)?  Other   Does the patient have Home health ordered?  No   Is there a DME ordered?  No   Prep survey completed?  Yes          Michelle Perkins RN

## 2019-11-05 ENCOUNTER — READMISSION MANAGEMENT (OUTPATIENT)
Dept: CALL CENTER | Facility: HOSPITAL | Age: 64
End: 2019-11-05

## 2019-11-05 VITALS
BODY MASS INDEX: 26.12 KG/M2 | HEIGHT: 64 IN | RESPIRATION RATE: 17 BRPM | WEIGHT: 153 LBS | SYSTOLIC BLOOD PRESSURE: 169 MMHG | TEMPERATURE: 97.7 F | DIASTOLIC BLOOD PRESSURE: 75 MMHG | OXYGEN SATURATION: 98 % | HEART RATE: 64 BPM

## 2019-11-05 LAB
ANION GAP SERPL CALCULATED.3IONS-SCNC: 12 MMOL/L (ref 5–15)
BASOPHILS # BLD AUTO: 0 10*3/MM3 (ref 0–0.2)
BASOPHILS NFR BLD AUTO: 0.4 % (ref 0–1.5)
BUN BLD-MCNC: 57 MG/DL (ref 8–23)
BUN/CREAT SERPL: 10.2 (ref 7–25)
CALCIUM SPEC-SCNC: 8.2 MG/DL (ref 8.6–10.5)
CHLORIDE SERPL-SCNC: 100 MMOL/L (ref 98–107)
CO2 SERPL-SCNC: 24 MMOL/L (ref 22–29)
CREAT BLD-MCNC: 5.61 MG/DL (ref 0.57–1)
DEPRECATED RDW RBC AUTO: 52.1 FL (ref 37–54)
EOSINOPHIL # BLD AUTO: 0.1 10*3/MM3 (ref 0–0.4)
EOSINOPHIL NFR BLD AUTO: 1.7 % (ref 0.3–6.2)
ERYTHROCYTE [DISTWIDTH] IN BLOOD BY AUTOMATED COUNT: 14.9 % (ref 12.3–15.4)
GFR SERPL CREATININE-BSD FRML MDRD: 8 ML/MIN/1.73
GFR SERPL CREATININE-BSD FRML MDRD: ABNORMAL ML/MIN/{1.73_M2}
GLUCOSE BLD-MCNC: 119 MG/DL (ref 65–99)
HCT VFR BLD AUTO: 29.7 % (ref 34–46.6)
HGB BLD-MCNC: 9.5 G/DL (ref 12–15.9)
LYMPHOCYTES # BLD AUTO: 0.9 10*3/MM3 (ref 0.7–3.1)
LYMPHOCYTES NFR BLD AUTO: 11.9 % (ref 19.6–45.3)
MCH RBC QN AUTO: 31.3 PG (ref 26.6–33)
MCHC RBC AUTO-ENTMCNC: 32.1 G/DL (ref 31.5–35.7)
MCV RBC AUTO: 97.6 FL (ref 79–97)
MONOCYTES # BLD AUTO: 0.5 10*3/MM3 (ref 0.1–0.9)
MONOCYTES NFR BLD AUTO: 6.4 % (ref 5–12)
NEUTROPHILS # BLD AUTO: 5.9 10*3/MM3 (ref 1.7–7)
NEUTROPHILS NFR BLD AUTO: 79.6 % (ref 42.7–76)
NRBC BLD AUTO-RTO: 0.1 /100 WBC (ref 0–0.2)
PLATELET # BLD AUTO: 153 10*3/MM3 (ref 140–450)
PMV BLD AUTO: 9.8 FL (ref 6–12)
POTASSIUM BLD-SCNC: 5.3 MMOL/L (ref 3.5–5.2)
RBC # BLD AUTO: 3.04 10*6/MM3 (ref 3.77–5.28)
SODIUM BLD-SCNC: 136 MMOL/L (ref 136–145)
TROPONIN T SERPL-MCNC: 0.04 NG/ML (ref 0–0.03)
WBC NRBC COR # BLD: 7.4 10*3/MM3 (ref 3.4–10.8)

## 2019-11-05 PROCEDURE — 99217 PR OBSERVATION CARE DISCHARGE MANAGEMENT: CPT | Performed by: INTERNAL MEDICINE

## 2019-11-05 PROCEDURE — 80048 BASIC METABOLIC PNL TOTAL CA: CPT | Performed by: INTERNAL MEDICINE

## 2019-11-05 PROCEDURE — G0257 UNSCHED DIALYSIS ESRD PT HOS: HCPCS

## 2019-11-05 PROCEDURE — G0378 HOSPITAL OBSERVATION PER HR: HCPCS

## 2019-11-05 PROCEDURE — 99213 OFFICE O/P EST LOW 20 MIN: CPT | Performed by: INTERNAL MEDICINE

## 2019-11-05 PROCEDURE — 85025 COMPLETE CBC W/AUTO DIFF WBC: CPT | Performed by: INTERNAL MEDICINE

## 2019-11-05 RX ORDER — LOPERAMIDE HYDROCHLORIDE 2 MG/1
2 CAPSULE ORAL EVERY 4 HOURS PRN
Status: DISCONTINUED | OUTPATIENT
Start: 2019-11-05 | End: 2019-11-05 | Stop reason: HOSPADM

## 2019-11-05 RX ORDER — ALBUMIN (HUMAN) 12.5 G/50ML
12.5 SOLUTION INTRAVENOUS AS NEEDED
Status: DISCONTINUED | OUTPATIENT
Start: 2019-11-05 | End: 2019-11-05 | Stop reason: HOSPADM

## 2019-11-05 RX ORDER — ATORVASTATIN CALCIUM 20 MG/1
20 TABLET, FILM COATED ORAL NIGHTLY
Qty: 30 TABLET | Refills: 0 | Status: SHIPPED | OUTPATIENT
Start: 2019-11-05

## 2019-11-05 RX ORDER — NITROGLYCERIN 0.4 MG/1
0.4 TABLET SUBLINGUAL
Qty: 10 TABLET | Refills: 0 | Status: ON HOLD | OUTPATIENT
Start: 2019-11-05 | End: 2020-03-13

## 2019-11-05 RX ADMIN — HYDROCODONE BITARTRATE AND ACETAMINOPHEN 1 TABLET: 10; 325 TABLET ORAL at 11:23

## 2019-11-05 RX ADMIN — LANTHANUM CARBONATE 500 MG: 500 TABLET, CHEWABLE ORAL at 11:23

## 2019-11-05 RX ADMIN — CYANOCOBALAMIN TAB 1000 MCG 1000 MCG: 1000 TAB at 08:41

## 2019-11-05 RX ADMIN — DICYCLOMINE HYDROCHLORIDE 10 MG: 10 CAPSULE ORAL at 08:41

## 2019-11-05 RX ADMIN — LOPERAMIDE HYDROCHLORIDE 2 MG: 2 CAPSULE ORAL at 03:35

## 2019-11-05 RX ADMIN — MELATONIN 2000 UNITS: at 08:41

## 2019-11-05 RX ADMIN — ASPIRIN 81 MG: 81 TABLET, COATED ORAL at 08:41

## 2019-11-05 RX ADMIN — LOPERAMIDE HYDROCHLORIDE 2 MG: 2 CAPSULE ORAL at 08:46

## 2019-11-05 RX ADMIN — Medication 10 ML: at 08:42

## 2019-11-05 RX ADMIN — CLOPIDOGREL BISULFATE 75 MG: 75 TABLET ORAL at 08:41

## 2019-11-05 RX ADMIN — HYDROCODONE BITARTRATE AND ACETAMINOPHEN 1 TABLET: 10; 325 TABLET ORAL at 04:31

## 2019-11-05 RX ADMIN — BUPROPION HYDROCHLORIDE 150 MG: 150 TABLET, EXTENDED RELEASE ORAL at 08:41

## 2019-11-05 RX ADMIN — METOPROLOL TARTRATE 12.5 MG: 25 TABLET ORAL at 08:41

## 2019-11-05 NOTE — CONSULTS
Thank you very much for the consult    Reason For The Consult: ESRD management    HPI: 64 year old pleasant white female with recent h/o; coronary stent placement , re-admitted with chest pain , cardiology on case , we were asked to manage esrd.  The pt. Report off and on chest pain , does not radiate , no nausea or vomiting , fever or chills.  PMH:   Past Medical History:   Diagnosis Date   • Anxiety    • Arthritis     osteoarthritis   • ASVD (arteriosclerotic vascular disease)     Abdominal Aorta   • CAD (coronary artery disease)    • Chronic anemia     Anemia of Chronic Disease   • Elevated cholesterol    • ESRD (end stage renal disease) (CMS/HCC)     With HD on TU/TH/SAT   • Fatty liver    • Gastritis     Non Erosive   • GERD (gastroesophageal reflux disease)    • Hiatal hernia    • Hypertension    • Peripheral neuropathy    • Seizure disorder (CMS/HCC)    • Type 2 diabetes mellitus (CMS/HCC)    • Urinary incontinence       Past Surgical History:   Procedure Laterality Date   • ARTERIOVENOUS FISTULA REPAIR Left 2017    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   • CARDIAC CATHETERIZATION N/A 11/1/2019    Procedure: Left Heart Cath and coronary angiogram;  Surgeon: Esdras Woodruff MD;  Location: [facility] CATH INVASIVE LOCATION;  Service: Cardiovascular   • CARDIAC CATHETERIZATION N/A 11/1/2019    Procedure: Stent SIRIA coronary;  Surgeon: Esdras Woodruff MD;  Location: [facility] CATH INVASIVE LOCATION;  Service: Cardiovascular   • COLONOSCOPY  2004    2004, 2016   • CORONARY ANGIOPLASTY  2018   • ENDOSCOPY      x2   • GASTRIC BYPASS  2013    Converted to gastric sleeve 2014 due to non-healing ulcer   • LIVER BIOPSY     • ORIF ANKLE FRACTURE Left     Lt ankle Fx w/ORIF   • OTHER SURGICAL HISTORY Right 07/29/2011    Vitreous hemorrhage and retinal surgery right  eye   • IL RT/LT HEART CATHETERS N/A 11/1/2019    Procedure: Percutaneous Coronary Intervention;  Surgeon: Esdras Woodruff MD;  Location: [facility] INVASIVE LOCATION;  Service: Cardiovascular   • TOTAL ABDOMINAL HYSTERECTOMY      w/bladder susp/LSO     FHX:   Family History   Problem Relation Age of Onset   • Diabetes Mother    • Hypertension Mother    • Coronary artery disease Mother    • Dementia Mother    • Heart disease Mother    • Diabetes Father    • Cancer Father         Lung Cancer     SHX:   Social History     Substance and Sexual Activity   Alcohol Use No   • Frequency: Never      Social History     Tobacco Use   Smoking Status Never Smoker   Smokeless Tobacco Never Used      Social History     Substance and Sexual Activity   Drug Use No    no drug use     Home Medications:   Medications Prior to Admission   Medication Sig Dispense Refill Last Dose   • ALPRAZolam (XANAX) 1 MG tablet Take 1 tablet by mouth Daily As Needed for Anxiety. 12 tablet 0 More than a month at Unknown time   • aspirin 81 MG EC tablet Take 81 mg by mouth Daily.   10/25/2019 at Unknown time   • B Complex-C-Folic Acid (DE-NICKY) tablet Take 1 tablet by mouth Daily. 90 each 3 10/24/2019   • buPROPion XL (WELLBUTRIN XL) 150 MG 24 hr tablet Take 150 mg by mouth Daily.   10/24/2019   • cholecalciferol (VITAMIN D3) 1000 units tablet Take 2 tablets by mouth Daily.   10/24/2019   • cinacalcet (SENSIPAR) 30 MG tablet Take 1 tablet by mouth Daily.   10/24/2019   • clopidogrel (PLAVIX) 75 MG tablet Take 1 tablet by mouth Daily. 30 tablet 0    • cyclobenzaprine (FLEXERIL) 10 MG tablet Take 0.5-1 tablets by mouth Daily As Needed for Muscle Spasms. 60 tablet 1 10/24/2019   • dicyclomine (BENTYL) 10 MG capsule Take 10 mg by mouth 2 (Two) Times a Day.   10/24/2019   • HYDROcodone-acetaminophen (NORCO)  MG per tablet Take 1 tablet by mouth Every 6 (Six) Hours As Needed for Moderate Pain .   More than a month at Unknown time   • lanthanum  (FOSRENOL) 500 MG chewable tablet Chew 1-3 tablets 3 (Three) Times a Day. With meal 630 tablet 3 10/24/2019   • levETIRAcetam (KEPPRA) 500 MG tablet Take 500 mg by mouth 2 (Two) Times a Day.   10/24/2019   • LYRICA 150 MG capsule Take 1 capsule by mouth every night at bedtime. 90 capsule 1 10/24/2019   • metoprolol tartrate (LOPRESSOR) 25 MG tablet Take 0.5 tablets by mouth Every 12 (Twelve) Hours. 30 tablet 0    • ondansetron (ZOFRAN) 4 MG tablet Take 1 tablet by mouth Every 8 (Eight) Hours As Needed.   Taking   • vitamin B-12 (CYANOCOBALAMIN) 1000 MCG tablet Take 1 tablet by mouth Daily.   10/24/2019 at Unknown time       Allergies: No Known Allergies    Physical Exam:  General: IN NAD    Vital Signs  Vitals:    11/05/19 1100   BP: 153/73   Pulse: 59   Resp: 16   Temp: 97.4 °F (36.3 °C)   SpO2: 98%     No intake/output data recorded.  I/O last 3 completed shifts:  In: 120 [P.O.:120]  Out: -       HEENT:  Normo cephalic, Atraumatic, EOMI, PERRLA, NO icterus,  Neck:  Supple, No JVD, No Carotid artery bruit, No lymphadenopathy  Chest: Clear to auscultation bilaterally  Cardiovascular: Regular rate and rhythm. Positive S1 & S2, No rub, murmur or gallop.  Abdominal: Soft, non tender, no palpable organomegaly, no abdominal bruit, positive bowel sounds  Musculoskeletal: No joint tenderness or swelling, good range of motion, Adequate muscle strength on both sides, no cyanosis, clubbing or edema on lower extremities.  Neuro: Alert oriented x3, CN1 - 12 intact, No focal sensory or motor deficit.      Review of Systems:   CNS: Denied headaches, blurred vision, tingling or numbness in any part of body. No weakness or any impaired speech.  CVS: No chest pain or shortness of breath, No orthopnea or PND or exertional dyspnea,  Pulmonary: Denies shortness of breath, coughs, hematemesis, night sweats or weight loss.  GI: Denies diarrhea, nausea, vomiting. Denies weight loss, hematemesis, melena.  : Denies dysuria, frequency or  hesitancy of urination  Vascular: Denies claudication, resting pain, tingling numbness or weakness in any part of the body.  Musculoskeletal: Denies Joint tenderness or pain, denies stiffness in joints, denies fever or weight loss, or skin rashes.    Current Labs  [unfilled]  Lab Results (last 24 hours)     Procedure Component Value Units Date/Time    Basic Metabolic Panel [[id number]]  (Abnormal) Collected:  11/05/19 0540    Specimen:  Blood Updated:  11/05/19 0618     Glucose 119 mg/dL      BUN 57 mg/dL      Creatinine 5.61 mg/dL      Sodium 136 mmol/L      Potassium 5.3 mmol/L      Chloride 100 mmol/L      CO2 24.0 mmol/L      Calcium 8.2 mg/dL      eGFR   Amer --     Comment: <15 Indicative of kidney failure.        eGFR Non African Amer 8 mL/min/1.73      Comment: <15 Indicative of kidney failure.        BUN/Creatinine Ratio 10.2     Anion Gap 12.0 mmol/L     Narrative:       GFR Normal >60  Chronic Kidney Disease <60  Kidney Failure <15    CBC Auto Differential [[id number]]  (Abnormal) Collected:  11/05/19 0540    Specimen:  Blood Updated:  11/05/19 0552     WBC 7.40 10*3/mm3      RBC 3.04 10*6/mm3      Hemoglobin 9.5 g/dL      Hematocrit 29.7 %      MCV 97.6 fL      MCH 31.3 pg      MCHC 32.1 g/dL      RDW 14.9 %      RDW-SD 52.1 fl      MPV 9.8 fL      Platelets 153 10*3/mm3      Neutrophil % 79.6 %      Lymphocyte % 11.9 %      Monocyte % 6.4 %      Eosinophil % 1.7 %      Basophil % 0.4 %      Neutrophils, Absolute 5.90 10*3/mm3      Lymphocytes, Absolute 0.90 10*3/mm3      Monocytes, Absolute 0.50 10*3/mm3      Eosinophils, Absolute 0.10 10*3/mm3      Basophils, Absolute 0.00 10*3/mm3      nRBC 0.1 /100 WBC     Troponin [[id number]]  (Abnormal) Collected:  11/04/19 2350    Specimen:  Blood Updated:  11/05/19 0028     Troponin T 0.042 ng/mL     Narrative:       Troponin T Reference Range:  <= 0.03 ng/mL-   Negative for AMI  >0.03 ng/mL-     Abnormal for myocardial necrosis.  Clinicians would have to  utilize clinical acumen, EKG, Troponin and serial changes to determine if it is an Acute Myocardial Infarction or myocardial injury due to an underlying chronic condition.     Troponin [[id number]]  (Abnormal) Collected:  11/04/19 1844    Specimen:  Blood Updated:  11/04/19 2005     Troponin T 0.045 ng/mL     Narrative:       Troponin T Reference Range:  <= 0.03 ng/mL-   Negative for AMI  >0.03 ng/mL-     Abnormal for myocardial necrosis.  Clinicians would have to utilize clinical acumen, EKG, Troponin and serial changes to determine if it is an Acute Myocardial Infarction or myocardial injury due to an underlying chronic condition.         Current Radiology  Imaging Results (Last 24 Hours)     ** No results found for the last 24 hours. **        Current Meds    Current Facility-Administered Medications:   •  acetaminophen (TYLENOL) tablet 650 mg, 650 mg, Oral, Q4H PRN **OR** acetaminophen (TYLENOL) 160 MG/5ML solution 650 mg, 650 mg, Oral, Q4H PRN **OR** acetaminophen (TYLENOL) suppository 650 mg, 650 mg, Rectal, Q4H PRN, Hopkins, Birrilla, DO  •  albumin human 25 % IV SOLN 12.5 g, 12.5 g, Intravenous, PRN, Jannet Garcia MD  •  ALPRAZolam (XANAX) tablet 1 mg, 1 mg, Oral, Daily PRN, Hopkins, Birrilla, DO  •  aspirin EC tablet 81 mg, 81 mg, Oral, Daily, Hopkins, Birrilla, DO, 81 mg at 11/05/19 0841  •  atorvastatin (LIPITOR) tablet 20 mg, 20 mg, Oral, Nightly, Hopkins, Birrilla, DO, 20 mg at 11/04/19 2045  •  buPROPion XL (WELLBUTRIN XL) 24 hr tablet 150 mg, 150 mg, Oral, Daily, Hopkins, Birrilla, DO, 150 mg at 11/05/19 0841  •  cholecalciferol (VITAMIN D3) tablet 2,000 Units, 2,000 Units, Oral, Daily, Hopkins, Birrilla, DO, 2,000 Units at 11/05/19 0841  •  clopidogrel (PLAVIX) tablet 75 mg, 75 mg, Oral, Daily, Hopkins, Birrilla, DO, 75 mg at 11/05/19 0841  •  cyclobenzaprine (FLEXERIL) tablet 5 mg, 5 mg, Oral, Daily PRN, Cherie Hopkins DO  •  dicyclomine (BENTYL) capsule 10 mg, 10 mg, Oral, BID, Cherie Hopkins DO, 10  mg at 11/05/19 0841  •  enoxaparin (LOVENOX) syringe 30 mg, 30 mg, Subcutaneous, Daily, Hopkins, Birrilla, DO, 30 mg at 11/04/19 2046  •  HYDROcodone-acetaminophen (NORCO)  MG per tablet 1 tablet, 1 tablet, Oral, Q6H PRN, Hopkins, Birrilla, DO, 1 tablet at 11/05/19 1123  •  lanthanum (FOSRENOL) chewable tablet 500 mg, 500 mg, Oral, TID With Meals, Hopkins, Birrilla, DO, 500 mg at 11/05/19 1123  •  loperamide (IMODIUM) capsule 2 mg, 2 mg, Oral, Q4H PRN, Delmi Reich FNP, 2 mg at 11/05/19 0846  •  melatonin tablet 5 mg, 5 mg, Oral, Nightly PRN, Hopkins, Birrilla, DO  •  metoprolol tartrate (LOPRESSOR) tablet 12.5 mg, 12.5 mg, Oral, Q12H, Hopkins, Birrilla, DO, 12.5 mg at 11/05/19 0841  •  nitroglycerin (NITROSTAT) SL tablet 0.4 mg, 0.4 mg, Sublingual, Q5 Min PRN, Hopkins, Birrilla, DO  •  ondansetron (ZOFRAN) tablet 4 mg, 4 mg, Oral, Q6H PRN **OR** ondansetron (ZOFRAN) injection 4 mg, 4 mg, Intravenous, Q6H PRN, Hopkins, Birrilla, DO  •  pregabalin (LYRICA) capsule 150 mg, 150 mg, Oral, Nightly PRN, Hopkins, Birrilla, DO  •  sodium chloride 0.9 % flush 10 mL, 10 mL, Intravenous, Q12H, Hopkins, Birrilla, DO, 10 mL at 11/05/19 0842  •  sodium chloride 0.9 % flush 10 mL, 10 mL, Intravenous, PRN, Hopkins, Birrilla, DO  •  vitamin B-12 (CYANOCOBALAMIN) tablet 1,000 mcg, 1,000 mcg, Oral, Daily, Hopkins, Birrilla, DO, 1,000 mcg at 11/05/19 0841              Patient Active Problem List   Diagnosis   • Acquired hypertrophic pyloric stenosis   • Allergy status to narcotic agent   • AV fistula (CMS/HCC)   • Bereavement   • Burn   • Buttock pain   • Cold intolerance   • CAD (coronary artery disease)   • Degenerative joint disease   • Dyspnea on exertion   • Dysuria   • End stage renal disease (CMS/HCC)   • End-stage renal disease (CMS/HCC)   • Fatigue   • Fecal incontinence   • Gait disturbance   • Gastroesophageal reflux disease   • Generalized seizure (CMS/HCC)   • Hematuria   • Hyperlipidemia   • Hyperparathyroidism, secondary  (CMS/HCC)   • High alkaline phosphatase   • Hypertension   • Cardiac disease   • Intention tremor   • Intermittent vertigo   • Irritable bowel syndrome with diarrhea   • Lung mass   • Mass   • Mixed anxiety depressive disorder   • Muscle cramps   • Need for other prophylactic vaccination and inoculation against single diseases   • Normocytic anemia   • Peptic ulcer without hemorrhage or perforation   • Peripheral neuropathy   • Perirectal abscess   • Postmenopausal status   • Postural lightheadedness   • Rash   • Sleep apnea   • Status post bariatric surgery   • Status post percutaneous transluminal coronary angioplasty   • Steatosis of liver   • Syncope   • Toe ulcer (CMS/HCC)   • Type 2 diabetes mellitus (CMS/HCC)   • 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  11/05/19  1:03 PM

## 2019-11-05 NOTE — PLAN OF CARE
Problem: Patient Care Overview  Goal: Plan of Care Review  Outcome: Ongoing (interventions implemented as appropriate)   11/05/19 0338   Coping/Psychosocial   Plan of Care Reviewed With patient   Plan of Care Review   Progress improving   OTHER   Outcome Summary no chest pain at this time     Goal: Discharge Needs Assessment  Outcome: Ongoing (interventions implemented as appropriate)   11/05/19 0338   Discharge Needs Assessment   Readmission Within the Last 30 Days current reason for admission unrelated to previous admission  (discharged from [facility] in 11/3/2019)   Concerns to be Addressed no discharge needs identified;denies needs/concerns at this time   Patient/Family Anticipates Transition to home   Patient/Family Anticipated Services at Transition none   Transportation Concerns car, none   Transportation Anticipated family or friend will provide   Anticipated Changes Related to Illness none   Equipment Needed After Discharge none   Disability   Equipment Currently Used at Home none       Problem: Pain, Chronic (Adult)  Goal: Identify Related Risk Factors and Signs and Symptoms  Outcome: Ongoing (interventions implemented as appropriate)   11/05/19 0338   Pain, Chronic (Adult)   Related Risk Factors (Chronic Pain) disease process   Signs and Symptoms (Chronic Pain) BADLs/IADLs, reluctance/inability to perform;fatigue/weakness     Goal: Acceptable Pain/Comfort Level and Functional Ability  Outcome: Ongoing (interventions implemented as appropriate)   11/05/19 0338   Pain, Chronic (Adult)   Acceptable Pain/Comfort Level and Functional Ability making progress toward outcome

## 2019-11-05 NOTE — PLAN OF CARE
Problem: Patient Care Overview  Goal: Plan of Care Review  Outcome: Ongoing (interventions implemented as appropriate)   11/05/19 1343   Coping/Psychosocial   Plan of Care Reviewed With patient   Plan of Care Review   Progress improving   OTHER   Outcome Summary chest pain has eased some     Goal: Individualization and Mutuality  Outcome: Ongoing (interventions implemented as appropriate)    Goal: Discharge Needs Assessment  Outcome: Ongoing (interventions implemented as appropriate)    Goal: Interprofessional Rounds/Family Conf  Outcome: Ongoing (interventions implemented as appropriate)      Problem: Pain, Chronic (Adult)  Goal: Identify Related Risk Factors and Signs and Symptoms  Outcome: Ongoing (interventions implemented as appropriate)    Goal: Acceptable Pain/Comfort Level and Functional Ability  Outcome: Ongoing (interventions implemented as appropriate)

## 2019-11-05 NOTE — CONSULTS
Referring Provider: Hospitalist  Reason for Consultation: Chest pain    Patient Care Team:  Naty Kraus DO as PCP - General (Family Medicine)  Naty Kraus DO as PCP - Claims Attributed  Anya Deleon as Technologist    Chief complaint pain    Subjective .     History of present illness:  Michelle Howell is a 64 y.o. female with history of coronary status post recent stent placement history of hypertension hyperlipidemia and end-stage renal disease on hemodialysis presents to the hospital with complaints of chest pain or shortness of breath.  Patient states that she was having symptoms of shortness of breath yesterday and then she suddenly started having some sharp pain on the left side with radiation to the right side.  No complaint of any PND orthopnea.  No palpitations dizziness syncope or swelling of the feet.  She is taking her medicines regularly.  She is also on dialysis.    Review of Systems   Constitution: Negative for fever and malaise/fatigue.   Cardiovascular: Positive for chest pain. Negative for dyspnea on exertion and palpitations.   Respiratory: Positive for shortness of breath. Negative for cough.    Skin: Negative for rash.   Gastrointestinal: Negative for abdominal pain, nausea and vomiting.   Neurological: Negative for focal weakness and headaches.   All other systems reviewed and are negative.      History  Past Medical History:   Diagnosis Date   • Anxiety    • Arthritis     osteoarthritis   • ASVD (arteriosclerotic vascular disease)     Abdominal Aorta   • CAD (coronary artery disease)    • Chronic anemia     Anemia of Chronic Disease   • Elevated cholesterol    • ESRD (end stage renal disease) (CMS/[facility])     With HD on TU/TH/SAT   • Fatty liver    • Gastritis     Non Erosive   • GERD (gastroesophageal reflux disease)    • Hiatal hernia    • Hypertension    • Peripheral neuropathy    • Seizure disorder (CMS/[facility])    • Type 2 diabetes mellitus (CMS/[facility])    • Urinary incontinence         Past Surgical History:   Procedure Laterality Date   • ARTERIOVENOUS FISTULA REPAIR Left 2017    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   • CARDIAC CATHETERIZATION N/A 11/1/2019    Procedure: Left Heart Cath and coronary angiogram;  Surgeon: Esdras Woodruff MD;  Location: [facility] CATH INVASIVE LOCATION;  Service: Cardiovascular   • CARDIAC CATHETERIZATION N/A 11/1/2019    Procedure: Stent SIRIA coronary;  Surgeon: Esdras Woodruff MD;  Location: [facility] CATH INVASIVE LOCATION;  Service: Cardiovascular   • COLONOSCOPY  2004    2004, 2016   • CORONARY ANGIOPLASTY  2018   • ENDOSCOPY      x2   • GASTRIC BYPASS  2013    Converted to gastric sleeve 2014 due to non-healing ulcer   • LIVER BIOPSY     • ORIF ANKLE FRACTURE Left     Lt ankle Fx w/ORIF   • OTHER SURGICAL HISTORY Right 07/29/2011    Vitreous hemorrhage and retinal surgery right eye   • GA RT/LT HEART CATHETERS N/A 11/1/2019    Procedure: Percutaneous Coronary Intervention;  Surgeon: Esdras Woodruff MD;  Location: [facility] CATH INVASIVE LOCATION;  Service: Cardiovascular   • TOTAL ABDOMINAL HYSTERECTOMY      w/bladder susp/LSO       Family History   Problem Relation Age of Onset   • Diabetes Mother    • Hypertension Mother    • Coronary artery disease Mother    • Dementia Mother    • Heart disease Mother    • Diabetes Father    • Cancer Father         Lung Cancer       Social History     Tobacco Use   • Smoking status: Never Smoker   • Smokeless tobacco: Never Used   Substance Use Topics   • Alcohol use: No     Frequency: Never   • Drug use: No        Medications Prior to Admission   Medication Sig Dispense Refill Last Dose   • ALPRAZolam (XANAX) 1 MG tablet Take 1 tablet by mouth Daily As Needed for Anxiety. 12 tablet 0 More than a month at Unknown time   • aspirin 81 MG EC tablet Take 81 mg by mouth  Daily.   10/25/2019 at Unknown time   • B Complex-C-Folic Acid (DE-NICKY) tablet Take 1 tablet by mouth Daily. 90 each 3 10/24/2019   • buPROPion XL (WELLBUTRIN XL) 150 MG 24 hr tablet Take 150 mg by mouth Daily.   10/24/2019   • cholecalciferol (VITAMIN D3) 1000 units tablet Take 2 tablets by mouth Daily.   10/24/2019   • cinacalcet (SENSIPAR) 30 MG tablet Take 1 tablet by mouth Daily.   10/24/2019   • clopidogrel (PLAVIX) 75 MG tablet Take 1 tablet by mouth Daily. 30 tablet 0    • cyclobenzaprine (FLEXERIL) 10 MG tablet Take 0.5-1 tablets by mouth Daily As Needed for Muscle Spasms. 60 tablet 1 10/24/2019   • dicyclomine (BENTYL) 10 MG capsule Take 10 mg by mouth 2 (Two) Times a Day.   10/24/2019   • HYDROcodone-acetaminophen (NORCO)  MG per tablet Take 1 tablet by mouth Every 6 (Six) Hours As Needed for Moderate Pain .   More than a month at Unknown time   • lanthanum (FOSRENOL) 500 MG chewable tablet Chew 1-3 tablets 3 (Three) Times a Day. With meal 630 tablet 3 10/24/2019   • levETIRAcetam (KEPPRA) 500 MG tablet Take 500 mg by mouth 2 (Two) Times a Day.   10/24/2019   • LYRICA 150 MG capsule Take 1 capsule by mouth every night at bedtime. 90 capsule 1 10/24/2019   • metoprolol tartrate (LOPRESSOR) 25 MG tablet Take 0.5 tablets by mouth Every 12 (Twelve) Hours. 30 tablet 0    • ondansetron (ZOFRAN) 4 MG tablet Take 1 tablet by mouth Every 8 (Eight) Hours As Needed.   Taking   • vitamin B-12 (CYANOCOBALAMIN) 1000 MCG tablet Take 1 tablet by mouth Daily.   10/24/2019 at Unknown time         Patient has no known allergies.    Scheduled Meds:    aspirin 81 mg Oral Daily   atorvastatin 20 mg Oral Nightly   buPROPion  mg Oral Daily   cholecalciferol 2,000 Units Oral Daily   clopidogrel 75 mg Oral Daily   dicyclomine 10 mg Oral BID   enoxaparin 30 mg Subcutaneous Daily   lanthanum 500 mg Oral TID With Meals   metoprolol tartrate 12.5 mg Oral Q12H   sodium chloride 10 mL Intravenous Q12H   vitamin B-12 1,000  "mcg Oral Daily     Continuous Infusions:   PRN Meds:.•  acetaminophen **OR** acetaminophen **OR** acetaminophen  •  albumin human  •  ALPRAZolam  •  cyclobenzaprine  •  HYDROcodone-acetaminophen  •  loperamide  •  melatonin  •  nitroglycerin  •  ondansetron **OR** ondansetron  •  pregabalin  •  sodium chloride    Objective     VITAL SIGNS  Vitals:    11/05/19 0700 11/05/19 1100 11/05/19 1320 11/05/19 1349   BP: 156/77 153/73 124/60 133/69   BP Location: Right arm Right arm     Patient Position: Lying Lying     Pulse: 61 59 57 62   Resp: 16 16 17 18   Temp: 98.1 °F (36.7 °C) 97.4 °F (36.3 °C) 98.1 °F (36.7 °C) 98.1 °F (36.7 °C)   TempSrc: Oral Oral     SpO2: 93% 98%     Weight:       Height:           Flowsheet Rows      First Filed Value   Admission Height  162.6 cm (64\") Documented at 11/04/2019 1736   Admission Weight  69.4 kg (152 lb 16 oz) Documented at 11/04/2019 1736           TELEMETRY: Sinus rhythm    Physical Exam:  Physical Exam   Constitutional: She appears well-developed and well-nourished.   HENT:   Head: Normocephalic and atraumatic.   Eyes: Conjunctivae are normal. No scleral icterus.   Neck: Normal range of motion. Neck supple. No JVD present. Carotid bruit is not present.   Cardiovascular: Normal rate, regular rhythm, S1 normal, S2 normal, normal heart sounds and intact distal pulses. PMI is not displaced.   Pulmonary/Chest: Effort normal and breath sounds normal. She has no wheezes. She has no rales.   Abdominal: Soft. Bowel sounds are normal.   Neurological: She is alert. She has normal strength.   Skin: Skin is warm and dry. No rash noted.        Results Review:   I reviewed the patient's new clinical results.  Lab Results (last 24 hours)     Procedure Component Value Units Date/Time    Basic Metabolic Panel [[id number]]  (Abnormal) Collected:  11/05/19 0540    Specimen:  Blood Updated:  11/05/19 0618     Glucose 119 mg/dL      BUN 57 mg/dL      Creatinine 5.61 mg/dL      Sodium 136 mmol/L      " Potassium 5.3 mmol/L      Chloride 100 mmol/L      CO2 24.0 mmol/L      Calcium 8.2 mg/dL      eGFR   Amer --     Comment: <15 Indicative of kidney failure.        eGFR Non African Amer 8 mL/min/1.73      Comment: <15 Indicative of kidney failure.        BUN/Creatinine Ratio 10.2     Anion Gap 12.0 mmol/L     Narrative:       GFR Normal >60  Chronic Kidney Disease <60  Kidney Failure <15    CBC Auto Differential [[id number]]  (Abnormal) Collected:  11/05/19 0540    Specimen:  Blood Updated:  11/05/19 0552     WBC 7.40 10*3/mm3      RBC 3.04 10*6/mm3      Hemoglobin 9.5 g/dL      Hematocrit 29.7 %      MCV 97.6 fL      MCH 31.3 pg      MCHC 32.1 g/dL      RDW 14.9 %      RDW-SD 52.1 fl      MPV 9.8 fL      Platelets 153 10*3/mm3      Neutrophil % 79.6 %      Lymphocyte % 11.9 %      Monocyte % 6.4 %      Eosinophil % 1.7 %      Basophil % 0.4 %      Neutrophils, Absolute 5.90 10*3/mm3      Lymphocytes, Absolute 0.90 10*3/mm3      Monocytes, Absolute 0.50 10*3/mm3      Eosinophils, Absolute 0.10 10*3/mm3      Basophils, Absolute 0.00 10*3/mm3      nRBC 0.1 /100 WBC     Troponin [[id number]]  (Abnormal) Collected:  11/04/19 2350    Specimen:  Blood Updated:  11/05/19 0028     Troponin T 0.042 ng/mL     Narrative:       Troponin T Reference Range:  <= 0.03 ng/mL-   Negative for AMI  >0.03 ng/mL-     Abnormal for myocardial necrosis.  Clinicians would have to utilize clinical acumen, EKG, Troponin and serial changes to determine if it is an Acute Myocardial Infarction or myocardial injury due to an underlying chronic condition.     Troponin [[id number]]  (Abnormal) Collected:  11/04/19 1844    Specimen:  Blood Updated:  11/04/19 2005     Troponin T 0.045 ng/mL     Narrative:       Troponin T Reference Range:  <= 0.03 ng/mL-   Negative for AMI  >0.03 ng/mL-     Abnormal for myocardial necrosis.  Clinicians would have to utilize clinical acumen, EKG, Troponin and serial changes to determine if it is an Acute Myocardial  Infarction or myocardial injury due to an underlying chronic condition.           Imaging Results (Last 24 Hours)     ** No results found for the last 24 hours. **          EKG      I personally viewed and interpreted the patient's EKG/Telemetry data:    ECHOCARDIOGRAM:      STRESS MYOVIEW:    CARDIAC CATHETERIZATION:    OTHER:         Assessment/Plan     #1 chest pain shortness of breath  2.  History of coronary status post recent stent placement  3.  History of hypertension  4.  Hyperlipidemia  5.  End-stage renal disease on hemodialysis    Patient is admitted and ruled out for MI by EKG and enzymes  Patient has atypical pain along with shortness of breath which could be volume overload  Patient is having dialysis performed.  Patient will be continued on home medications including aspirin Plavix and statins and beta-blockers as needed.  Continue current medical therapy and if she has more pain then will perform a cardiac catheterization.  I discussed the patients findings and my recommendations with patient and nurse    Esdras Woodruff MD  11/05/19  4:14 PM

## 2019-11-05 NOTE — OUTREACH NOTE
Medical Week 1 Survey      Responses   Facility patient discharged from?  [facility]   Does the patient have one of the following disease processes/diagnoses(primary or secondary)?  Other   Is there a successful TCM telephone encounter documented?  No   Week 1 attempt successful?  No   Revoke  Readmitted          Delmi Arzola LPN

## 2019-11-05 NOTE — H&P
[facility] HOSPITALIST     Nayana NatyDO    CHIEF COMPLAINT:   Chest pain    HISTORY OF PRESENT ILLNESS:    Patient is a 65 y/o ESRD on HD patient who was just discharged from here yesterday 11/3/19. Patient underwent cardiac catheterization with stent to the LAD. Pt reports she developed substernal chest pressure around 1000 this morning that was constant so she went to the ED around 1200 and remained unrelieved until she was given nitro. She had nitro paste placed with complete resolution of her symptoms. She reports associated shortness of breath and nausea.       Past Medical History:   Diagnosis Date   • Anxiety    • Arthritis     osteoarthritis   • ASVD (arteriosclerotic vascular disease)     Abdominal Aorta   • CAD (coronary artery disease)    • Chronic anemia     Anemia of Chronic Disease   • Elevated cholesterol    • ESRD (end stage renal disease) (CMS/[facility])     With HD on TU/TH/SAT   • Fatty liver    • Gastritis     Non Erosive   • GERD (gastroesophageal reflux disease)    • Hiatal hernia    • Hypertension    • Peripheral neuropathy    • Seizure disorder (CMS/[facility])    • Type 2 diabetes mellitus (CMS/[facility])    • Urinary incontinence      Past Surgical History:   Procedure Laterality Date   • ARTERIOVENOUS FISTULA REPAIR Left 2017    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   • CARDIAC CATHETERIZATION N/A 11/1/2019    Procedure: Left Heart Cath and coronary angiogram;  Surgeon: Esdras Woodruff MD;  Location: [facility] CATH INVASIVE LOCATION;  Service: Cardiovascular   • CARDIAC CATHETERIZATION N/A 11/1/2019    Procedure: Stent SIRIA coronary;  Surgeon: Esdras Woodruff MD;  Location: [facility] CATH INVASIVE LOCATION;  Service: Cardiovascular   • COLONOSCOPY  2004    2004, 2016   • CORONARY ANGIOPLASTY  2018   • ENDOSCOPY      x2   • GASTRIC  BYPASS  2013    Converted to gastric sleeve 2014 due to non-healing ulcer   • LIVER BIOPSY     • ORIF ANKLE FRACTURE Left     Lt ankle Fx w/ORIF   • OTHER SURGICAL HISTORY Right 07/29/2011    Vitreous hemorrhage and retinal surgery right eye   • OK RT/LT HEART CATHETERS N/A 11/1/2019    Procedure: Percutaneous Coronary Intervention;  Surgeon: Esdras Woodruff MD;  Location: [facility] INVASIVE LOCATION;  Service: Cardiovascular   • TOTAL ABDOMINAL HYSTERECTOMY      w/bladder susp/LSO     Family History   Problem Relation Age of Onset   • Diabetes Mother    • Hypertension Mother    • Coronary artery disease Mother    • Dementia Mother    • Heart disease Mother    • Diabetes Father    • Cancer Father         Lung Cancer     Social History     Tobacco Use   • Smoking status: Never Smoker   • Smokeless tobacco: Never Used   Substance Use Topics   • Alcohol use: No     Frequency: Never   • Drug use: No     Medications Prior to Admission   Medication Sig Dispense Refill Last Dose   • ALPRAZolam (XANAX) 1 MG tablet Take 1 tablet by mouth Daily As Needed for Anxiety. 12 tablet 0 More than a month at Unknown time   • aspirin 81 MG EC tablet Take 81 mg by mouth Daily.   10/25/2019 at Unknown time   • B Complex-C-Folic Acid (DE-NICKY) tablet Take 1 tablet by mouth Daily. 90 each 3 10/24/2019   • buPROPion XL (WELLBUTRIN XL) 150 MG 24 hr tablet Take 150 mg by mouth Daily.   10/24/2019   • cholecalciferol (VITAMIN D3) 1000 units tablet Take 2 tablets by mouth Daily.   10/24/2019   • cinacalcet (SENSIPAR) 30 MG tablet Take 1 tablet by mouth Daily.   10/24/2019   • clopidogrel (PLAVIX) 75 MG tablet Take 1 tablet by mouth Daily. 30 tablet 0    • cyclobenzaprine (FLEXERIL) 10 MG tablet Take 0.5-1 tablets by mouth Daily As Needed for Muscle Spasms. 60 tablet 1 10/24/2019   • dicyclomine (BENTYL) 10 MG capsule Take 10 mg by mouth 2 (Two) Times a Day.   10/24/2019   • HYDROcodone-acetaminophen (NORCO)  MG per tablet Take 1 tablet  "by mouth Every 6 (Six) Hours As Needed for Moderate Pain .   More than a month at Unknown time   • lanthanum (FOSRENOL) 500 MG chewable tablet Chew 1-3 tablets 3 (Three) Times a Day. With meal 630 tablet 3 10/24/2019   • levETIRAcetam (KEPPRA) 500 MG tablet Take 500 mg by mouth 2 (Two) Times a Day.   10/24/2019   • LYRICA 150 MG capsule Take 1 capsule by mouth every night at bedtime. 90 capsule 1 10/24/2019   • metoprolol tartrate (LOPRESSOR) 25 MG tablet Take 0.5 tablets by mouth Every 12 (Twelve) Hours. 30 tablet 0    • ondansetron (ZOFRAN) 4 MG tablet Take 1 tablet by mouth Every 8 (Eight) Hours As Needed.   Taking   • vitamin B-12 (CYANOCOBALAMIN) 1000 MCG tablet Take 1 tablet by mouth Daily.   10/24/2019 at Unknown time     Allergies:  Patient has no known allergies.    Immunization History   Administered Date(s) Administered   • Flu Vaccine Intradermal Quad 18-64YR 10/19/2011   • Pneumococcal Conjugate 13-Valent (PCV13) 10/11/2018           REVIEW OF SYSTEMS:  Please see the above history of present illness for pertinent positives and negatives.  The remainder of the patient's systems have been reviewed and are negative.     Vital Signs  Temp:  [97.7 °F (36.5 °C)] 97.7 °F (36.5 °C)  Heart Rate:  [66] 66  Resp:  [14] 14  BP: (171)/(74) 171/74    Flowsheet Rows      First Filed Value   Admission Height  162.6 cm (64\") Documented at 11/04/2019 1736   Admission Weight  69.4 kg (152 lb 16 oz) Documented at 11/04/2019 1736           Physical Exam:    General: NAD, resting in bed  Eyes: PERRL, nonicteric  HENT: normocephalic, atraumatic, MMM, pharynx clear without erythema or exudate  Cardiology: S1, S2, +murmurs  Resp: CTA b/l, no r/r/w  GI: soft, nt, nd, +bs  Skin: warm, dry, intact, no rashes  Extremities: no c/c/e  Neuro: CN II-XII grossly intact, no focal deficits  Psych: appropriate mood and affect        Results Review:    I reviewed the patient's new clinical results.  Lab Results (most recent)     Procedure " Component Value Units Date/Time    Troponin [[id number]] Collected:  11/04/19 1844    Specimen:  Blood Updated:  11/04/19 1910          Imaging Results (Most Recent)     None        reviewed    ECG/EMG Results (most recent)     None        Reviewed personally and shows NSR with no acute ST-t lashawn    Assessment/Plan     Active Hospital Problems    Diagnosis POA   • Chest pain [R07.9] Yes           Chest pain s/p SIRIA to the LAD  - serial troponin  - troponin 0.031 at [facility]  - cardiology consulted from [facility]  - continue aspirin, Plavix, bbl  - add statin  - nitropaste in placed   - records from previous admission reviewed     CAD s/p SIRIA to the LAD        Mitral regurgitation -with severe left atrial dilation.   - Appears to have worsened significantly since July 2018.    - cardiology consulted as above        Anxiety - acute on chronic  -continue Xanax, Wellbutrin     Anemia -mild and normocytic, secondary to renal failure  -Monitor daily     Chronic pain -outpatient on a regimen of Flexeril, Bentyl, Norco, Lyrica  -Continue     ESRD on HD  - Tues, Thurs, Saturday; :   - Nephrology consult  - Continue Ibis-Ninoska; will need to restart Sensipar, Fosrenol      CAD with prior stent placement: Continue aspirin  -Beta-blocker started     Seizure disorder, chronic: Continue Keppra     Dietary supplementation: Continue     DVT prophylaxis - lovenox    I discussed the patient's findings and my recommendations with patient and her daughter .     Cherie Hopkins DO  11/04/19  7:48 PM

## 2019-11-05 NOTE — PROGRESS NOTES
Discharge Planning Assessment   [facility]     Patient Name: Michelle Howell  MRN: [id number]  Today's Date: 11/5/2019    Admit Date: 11/4/2019    Discharge Needs Assessment     Row Name 11/05/19 1353       Living Environment    Lives With  child(darrion), adult Lives with son and his family     Current Living Arrangements  home/apartment/condo    Primary Care Provided by  self    Able to Return to Prior Arrangements  yes       Resource/Environmental Concerns    Transportation Concerns  car, none       Transition Planning    Patient/Family Anticipates Transition to  home with family    Patient/Family Anticipated Services at Transition  none    Transportation Anticipated  car, drives self;family or friend will provide       Discharge Needs Assessment    Equipment Currently Used at Home  none    Anticipated Changes Related to Illness  none    Equipment Needed After Discharge  none        Discharge Plan     Row Name 11/05/19 1359       Plan    Plan  Routine d/c to home    Patient goes to [facility] for Dialysis on [address]. She transports herself            Demographic Summary     Row Name 11/05/19 1336       General Information    Admission Type  observation    Arrived From  emergency department    Referral Source  admission list    Reason for Consult  discharge planning    Preferred Language  English        Functional Status     Row Name 11/05/19 1358       Functional Status, IADL    Medications  independent    Meal Preparation  independent    Housekeeping  independent    Laundry  independent    Shopping  independent        Arlene Romero RN, CM  Office Phone [phone]  Cell [phone]

## 2019-11-06 ENCOUNTER — READMISSION MANAGEMENT (OUTPATIENT)
Dept: CALL CENTER | Facility: HOSPITAL | Age: 64
End: 2019-11-06

## 2019-11-06 PROCEDURE — 93010 ELECTROCARDIOGRAM REPORT: CPT | Performed by: INTERNAL MEDICINE

## 2019-11-06 NOTE — DISCHARGE SUMMARY
Date of Admission: 11/4/2019    Date of Discharge:  11/5/2019    Length of stay:  LOS: 0 days     Admission Diagnosis:   Chest pain     Discharge Diagnosis:      Chest pain   - ACS ruled out   - discussed with Dr. Woodruff and patient is ok to return home and f/u with him in 2 weeks   - continue aspirin, Plavix, bbl  - add statin and nitro prn     CAD s/p SIRIA to the LAD   - aspirin, plavix, statin, bbl     Mitral regurgitation -with severe left atrial dilation.   - Appears to have worsened significantly since July 2018.          Anxiety - acute on chronic  -continue Xanax, Wellbutrin     Anemia -mild and normocytic, secondary to renal failure  -Monitor daily     Chronic pain -outpatient on a regimen of Flexeril, Bentyl, Norco, Lyrica  -Continue     ESRD on HD  - Tues, Thurs, Saturday; :        Seizure disorder, chronic  - Continue Keppra     Dietary supplementation:  - Continue    Hospital Course:  Patient is a 64 y.o. female who was just discharged from here yesterday 11/3/19. Patient underwent cardiac catheterization with stent to the LAD. Pt presented to [facility] and reports she developed substernal chest pressure around 1000 this morning that was constant so she went to the ED around 1200 and remained unrelieved until she was given nitro. She had nitro paste placed with complete resolution of her symptoms. She reports associated shortness of breath and nausea.   Patient was transferred back to [facility] for further evaluation. Her troponin was monitored and she was seen by cardiology who felt patient was stable to discharge and f/u with him in two weeks. Patient will be started on lipitor and will be given a few sublingual nitroglycerin. She is to resume her regular HD schedule.     Procedures Performed:         Consults:   Consults     Date and Time Order Name Status Description    11/4/2019 1825 Inpatient Nephrology Consult      11/4/2019 1817 Inpatient Cardiology Consult      10/28/2019 1632 Inpatient  Cardiology Consult Completed     10/27/2019 1010 Inpatient Nephrology Consult Completed           Vital Signs:  Temp:  [97.4 °F (36.3 °C)-98.1 °F (36.7 °C)] 97.7 °F (36.5 °C)  Heart Rate:  [57-64] 64  Resp:  [15-18] 17  BP: (124-169)/(60-77) 169/75      Physical Exam:  Physical Exam   Constitutional: She is oriented to person, place, and time. She appears well-developed and well-nourished.   Cardiovascular: Normal rate and regular rhythm.   Murmur heard.  Pulmonary/Chest: Effort normal and breath sounds normal.   Abdominal: Soft. Bowel sounds are normal.   Neurological: She is alert and oriented to person, place, and time.             Pertinent Test Results:   Lab Results (last 72 hours)     Procedure Component Value Units Date/Time    Basic Metabolic Panel [[id number]]  (Abnormal) Collected:  11/05/19 0540    Specimen:  Blood Updated:  11/05/19 0618     Glucose 119 mg/dL      BUN 57 mg/dL      Creatinine 5.61 mg/dL      Sodium 136 mmol/L      Potassium 5.3 mmol/L      Chloride 100 mmol/L      CO2 24.0 mmol/L      Calcium 8.2 mg/dL      eGFR   Amer --     Comment: <15 Indicative of kidney failure.        eGFR Non African Amer 8 mL/min/1.73      Comment: <15 Indicative of kidney failure.        BUN/Creatinine Ratio 10.2     Anion Gap 12.0 mmol/L     Narrative:       GFR Normal >60  Chronic Kidney Disease <60  Kidney Failure <15    CBC Auto Differential [[id number]]  (Abnormal) Collected:  11/05/19 0540    Specimen:  Blood Updated:  11/05/19 0552     WBC 7.40 10*3/mm3      RBC 3.04 10*6/mm3      Hemoglobin 9.5 g/dL      Hematocrit 29.7 %      MCV 97.6 fL      MCH 31.3 pg      MCHC 32.1 g/dL      RDW 14.9 %      RDW-SD 52.1 fl      MPV 9.8 fL      Platelets 153 10*3/mm3      Neutrophil % 79.6 %      Lymphocyte % 11.9 %      Monocyte % 6.4 %      Eosinophil % 1.7 %      Basophil % 0.4 %      Neutrophils, Absolute 5.90 10*3/mm3      Lymphocytes, Absolute 0.90 10*3/mm3      Monocytes, Absolute 0.50 10*3/mm3       Eosinophils, Absolute 0.10 10*3/mm3      Basophils, Absolute 0.00 10*3/mm3      nRBC 0.1 /100 WBC     Troponin [[id number]]  (Abnormal) Collected:  11/04/19 2350    Specimen:  Blood Updated:  11/05/19 0028     Troponin T 0.042 ng/mL     Narrative:       Troponin T Reference Range:  <= 0.03 ng/mL-   Negative for AMI  >0.03 ng/mL-     Abnormal for myocardial necrosis.  Clinicians would have to utilize clinical acumen, EKG, Troponin and serial changes to determine if it is an Acute Myocardial Infarction or myocardial injury due to an underlying chronic condition.     Troponin [[id number]]  (Abnormal) Collected:  11/04/19 1844    Specimen:  Blood Updated:  11/04/19 2005     Troponin T 0.045 ng/mL     Narrative:       Troponin T Reference Range:  <= 0.03 ng/mL-   Negative for AMI  >0.03 ng/mL-     Abnormal for myocardial necrosis.  Clinicians would have to utilize clinical acumen, EKG, Troponin and serial changes to determine if it is an Acute Myocardial Infarction or myocardial injury due to an underlying chronic condition.               Results for orders placed during the hospital encounter of 10/26/19   Adult Transthoracic Echo Complete W/ Cont if Necessary Per Protocol    Narrative · Left atrial cavity size is severely dilated.  · Severe mitral valve regurgitation is present       Normal LV size and contractility EF of 60 to 65%  Normal RV size  Severe left atrial enlargement  Normal right atrial size  Severe mitral annular calcification with calcified posterior mitral   leaflet and eccentric MR which appears to be severe seen  Aortic valve,  tricuspid valve appears structurally normal, no significant   regurgitation seen.  No pericardial effusion seen.  Proximal aorta appears normal in size.         Imaging Results (All)     None                Discharge Disposition:  Home or Self Care    Discharge Medications:     Discharge Medications      New Medications      Instructions Start Date   atorvastatin 20 MG  tablet  Commonly known as:  LIPITOR   20 mg, Oral, Nightly      nitroglycerin 0.4 MG SL tablet  Commonly known as:  NITROSTAT   0.4 mg, Sublingual, Every 5 Minutes PRN, Take no more than 3 doses in 15 minutes.         Continue These Medications      Instructions Start Date   ALPRAZolam 1 MG tablet  Commonly known as:  XANAX   1 mg, Oral, Daily PRN      aspirin 81 MG EC tablet   81 mg, Oral, Daily      buPROPion  MG 24 hr tablet  Commonly known as:  WELLBUTRIN XL   150 mg, Oral, Daily      cholecalciferol 25 MCG (1000 UT) tablet  Commonly known as:  VITAMIN D3   2 tablets, Oral, Daily      clopidogrel 75 MG tablet  Commonly known as:  PLAVIX   75 mg, Oral, Daily      cyclobenzaprine 10 MG tablet  Commonly known as:  FLEXERIL   5-10 mg, Oral, Daily PRN      dicyclomine 10 MG capsule  Commonly known as:  BENTYL   10 mg, Oral, 2 Times Daily      HYDROcodone-acetaminophen  MG per tablet  Commonly known as:  NORCO   1 tablet, Oral, Every 6 Hours PRN      lanthanum 500 MG chewable tablet  Commonly known as:  FOSRENOL   500-1,500 mg, Oral, 3 Times Daily, With meal       levETIRAcetam 500 MG tablet  Commonly known as:  KEPPRA   500 mg, Oral, 2 Times Daily      LYRICA 150 MG capsule  Generic drug:  pregabalin   150 mg, Oral, Every Night at Bedtime      metoprolol tartrate 25 MG tablet  Commonly known as:  LOPRESSOR   12.5 mg, Oral, Every 12 Hours Scheduled      DE-NICKY tablet   1 tablet, Oral, Daily      SENSIPAR 30 MG tablet  Generic drug:  cinacalcet   1 tablet, Oral, Daily      vitamin B-12 1000 MCG tablet  Commonly known as:  CYANOCOBALAMIN   1 tablet, Oral, Daily      ZOFRAN 4 MG tablet  Generic drug:  ondansetron   1 tablet, Oral, Every 8 Hours PRN             Discharge Diet:   Diet Instructions     Diet: Cardiac, Renal      Discharge Diet:   Cardiac  Renal             Activity at Discharge:   Activity Instructions     Activity as Tolerated            Follow-up Appointments:  No future  appointments.      Test Results Pending at Discharge:  None    Condition on Discharge:    Stable      Risk for Readmission (LACE): Score: 7 (11/5/2019  6:00 AM)          Cherie Hopkins DO  11/05/19  8:14 PM

## 2019-11-06 NOTE — OUTREACH NOTE
Prep Survey      Responses   Facility patient discharged from?  [facility]   Is patient eligible?  Yes   Discharge diagnosis  CHEST PAIN   Does the patient have one of the following disease processes/diagnoses(primary or secondary)?  Other   Does the patient have Home health ordered?  No   Is there a DME ordered?  No   Medication alerts for this patient  LIPITOR, NITROSTAT   Prep survey completed?  Yes          Michelle Pretty LPN

## 2019-11-07 ENCOUNTER — READMISSION MANAGEMENT (OUTPATIENT)
Dept: CALL CENTER | Facility: HOSPITAL | Age: 64
End: 2019-11-07

## 2019-11-07 NOTE — OUTREACH NOTE
Medical Week 1 Survey      Responses   Facility patient discharged from?  [facility]   Does the patient have one of the following disease processes/diagnoses(primary or secondary)?  Other   Is there a successful TCM telephone encounter documented?  No   Week 1 attempt successful?  No   Rescheduled  Revoked   Revoke  Decline to participate [NO ANSWER, VM BOX IS FULL]          Michelle Pretty LPN

## 2019-11-08 ENCOUNTER — EPISODE CHANGES (OUTPATIENT)
Dept: CASE MANAGEMENT | Facility: OTHER | Age: 64
End: 2019-11-08

## 2019-11-13 ENCOUNTER — EPISODE CHANGES (OUTPATIENT)
Dept: CASE MANAGEMENT | Facility: OTHER | Age: 64
End: 2019-11-13

## 2019-11-13 ENCOUNTER — TELEPHONE (OUTPATIENT)
Dept: CARDIAC REHAB | Facility: HOSPITAL | Age: 64
End: 2019-11-13

## 2019-11-14 ENCOUNTER — TELEPHONE (OUTPATIENT)
Dept: CARDIAC REHAB | Facility: HOSPITAL | Age: 64
End: 2019-11-14

## 2019-11-15 ENCOUNTER — EPISODE CHANGES (OUTPATIENT)
Dept: CASE MANAGEMENT | Facility: OTHER | Age: 64
End: 2019-11-15

## 2019-11-20 ENCOUNTER — EPISODE CHANGES (OUTPATIENT)
Dept: CASE MANAGEMENT | Facility: OTHER | Age: 64
End: 2019-11-20

## 2019-11-25 LAB — FUNGUS WND CULT: NORMAL

## 2019-12-03 ENCOUNTER — EPISODE CHANGES (OUTPATIENT)
Dept: CASE MANAGEMENT | Facility: OTHER | Age: 64
End: 2019-12-03

## 2019-12-09 LAB
MYCOBACTERIUM SPEC CULT: NORMAL
NIGHT BLUE STAIN TISS: NORMAL

## 2019-12-30 ENCOUNTER — TELEPHONE (OUTPATIENT)
Dept: FAMILY MEDICINE CLINIC | Facility: CLINIC | Age: 64
End: 2019-12-30

## 2020-03-13 ENCOUNTER — HOSPITAL ENCOUNTER (OUTPATIENT)
Facility: HOSPITAL | Age: 65
Setting detail: OBSERVATION
Discharge: HOME | End: 2020-03-15
Attending: INTERNAL MEDICINE | Admitting: INTERNAL MEDICINE

## 2020-03-13 PROBLEM — R77.8 ELEVATED TROPONIN: Status: ACTIVE | Noted: 2020-03-13

## 2020-03-13 LAB
ANION GAP SERPL CALCULATED.3IONS-SCNC: 17 MMOL/L (ref 5–15)
BUN BLD-MCNC: 43 MG/DL (ref 8–23)
BUN/CREAT SERPL: 6.6 (ref 7–25)
CALCIUM SPEC-SCNC: 8.3 MG/DL (ref 8.6–10.5)
CHLORIDE SERPL-SCNC: 99 MMOL/L (ref 98–107)
CO2 SERPL-SCNC: 22 MMOL/L (ref 22–29)
CREAT BLD-MCNC: 6.49 MG/DL (ref 0.57–1)
DEPRECATED RDW RBC AUTO: 61.3 FL (ref 37–54)
ERYTHROCYTE [DISTWIDTH] IN BLOOD BY AUTOMATED COUNT: 18.4 % (ref 12.3–15.4)
GFR SERPL CREATININE-BSD FRML MDRD: 6 ML/MIN/1.73
GFR SERPL CREATININE-BSD FRML MDRD: ABNORMAL ML/MIN/{1.73_M2}
GLUCOSE BLD-MCNC: 188 MG/DL (ref 65–99)
GLUCOSE BLDC GLUCOMTR-MCNC: 179 MG/DL (ref 70–105)
HCT VFR BLD AUTO: 29.9 % (ref 34–46.6)
HGB BLD-MCNC: 9.9 G/DL (ref 12–15.9)
MCH RBC QN AUTO: 30.8 PG (ref 26.6–33)
MCHC RBC AUTO-ENTMCNC: 33.1 G/DL (ref 31.5–35.7)
MCV RBC AUTO: 93.1 FL (ref 79–97)
PLATELET # BLD AUTO: 349 10*3/MM3 (ref 140–450)
PMV BLD AUTO: 7.8 FL (ref 6–12)
POTASSIUM BLD-SCNC: 4.1 MMOL/L (ref 3.5–5.2)
RBC # BLD AUTO: 3.22 10*6/MM3 (ref 3.77–5.28)
SODIUM BLD-SCNC: 138 MMOL/L (ref 136–145)
TROPONIN T SERPL-MCNC: 0.3 NG/ML (ref 0–0.03)
WBC NRBC COR # BLD: 8.3 10*3/MM3 (ref 3.4–10.8)

## 2020-03-13 PROCEDURE — 99219 PR INITIAL OBSERVATION CARE/DAY 50 MINUTES: CPT | Performed by: NURSE PRACTITIONER

## 2020-03-13 PROCEDURE — G0378 HOSPITAL OBSERVATION PER HR: HCPCS

## 2020-03-13 PROCEDURE — 85027 COMPLETE CBC AUTOMATED: CPT | Performed by: INTERNAL MEDICINE

## 2020-03-13 PROCEDURE — 82962 GLUCOSE BLOOD TEST: CPT

## 2020-03-13 PROCEDURE — 84484 ASSAY OF TROPONIN QUANT: CPT | Performed by: NURSE PRACTITIONER

## 2020-03-13 PROCEDURE — 80048 BASIC METABOLIC PNL TOTAL CA: CPT | Performed by: INTERNAL MEDICINE

## 2020-03-13 PROCEDURE — 83036 HEMOGLOBIN GLYCOSYLATED A1C: CPT | Performed by: NURSE PRACTITIONER

## 2020-03-13 PROCEDURE — G0379 DIRECT REFER HOSPITAL OBSERV: HCPCS

## 2020-03-13 RX ORDER — PREGABALIN 75 MG/1
150 CAPSULE ORAL NIGHTLY
Status: DISCONTINUED | OUTPATIENT
Start: 2020-03-13 | End: 2020-03-15 | Stop reason: HOSPADM

## 2020-03-13 RX ORDER — FOLIC ACID/VIT B COMPLEX AND C 400 MCG
1 TABLET ORAL DAILY
Status: DISCONTINUED | OUTPATIENT
Start: 2020-03-14 | End: 2020-03-15 | Stop reason: HOSPADM

## 2020-03-13 RX ORDER — ACETAMINOPHEN 325 MG/1
650 TABLET ORAL EVERY 4 HOURS PRN
Status: DISCONTINUED | OUTPATIENT
Start: 2020-03-13 | End: 2020-03-15 | Stop reason: HOSPADM

## 2020-03-13 RX ORDER — DEXTROSE MONOHYDRATE 25 G/50ML
25 INJECTION, SOLUTION INTRAVENOUS
Status: DISCONTINUED | OUTPATIENT
Start: 2020-03-13 | End: 2020-03-15 | Stop reason: HOSPADM

## 2020-03-13 RX ORDER — CINACALCET 30 MG/1
30 TABLET, FILM COATED ORAL DAILY
Status: DISCONTINUED | OUTPATIENT
Start: 2020-03-14 | End: 2020-03-15 | Stop reason: HOSPADM

## 2020-03-13 RX ORDER — DULOXETIN HYDROCHLORIDE 30 MG/1
60 CAPSULE, DELAYED RELEASE ORAL DAILY
Status: DISCONTINUED | OUTPATIENT
Start: 2020-03-14 | End: 2020-03-15 | Stop reason: HOSPADM

## 2020-03-13 RX ORDER — LOSARTAN POTASSIUM 50 MG/1
50 TABLET ORAL DAILY
COMMUNITY
End: 2020-04-21

## 2020-03-13 RX ORDER — SODIUM CHLORIDE 0.9 % (FLUSH) 0.9 %
10 SYRINGE (ML) INJECTION AS NEEDED
Status: DISCONTINUED | OUTPATIENT
Start: 2020-03-13 | End: 2020-03-15 | Stop reason: HOSPADM

## 2020-03-13 RX ORDER — LOSARTAN POTASSIUM 50 MG/1
50 TABLET ORAL DAILY
Status: DISCONTINUED | OUTPATIENT
Start: 2020-03-14 | End: 2020-03-15 | Stop reason: HOSPADM

## 2020-03-13 RX ORDER — WARFARIN SODIUM 1 MG/1
1 TABLET ORAL EVERY OTHER DAY
COMMUNITY
End: 2020-07-29

## 2020-03-13 RX ORDER — HYDROCODONE BITARTRATE AND ACETAMINOPHEN 10; 325 MG/1; MG/1
1 TABLET ORAL EVERY 6 HOURS PRN
Status: DISCONTINUED | OUTPATIENT
Start: 2020-03-13 | End: 2020-03-15 | Stop reason: HOSPADM

## 2020-03-13 RX ORDER — ASPIRIN 81 MG/1
81 TABLET ORAL DAILY
Status: DISCONTINUED | OUTPATIENT
Start: 2020-03-14 | End: 2020-03-15 | Stop reason: HOSPADM

## 2020-03-13 RX ORDER — ACETAMINOPHEN 650 MG/1
650 SUPPOSITORY RECTAL EVERY 4 HOURS PRN
Status: DISCONTINUED | OUTPATIENT
Start: 2020-03-13 | End: 2020-03-15 | Stop reason: HOSPADM

## 2020-03-13 RX ORDER — ACETAMINOPHEN 160 MG/5ML
650 SOLUTION ORAL EVERY 4 HOURS PRN
Status: DISCONTINUED | OUTPATIENT
Start: 2020-03-13 | End: 2020-03-15 | Stop reason: HOSPADM

## 2020-03-13 RX ORDER — LANOLIN ALCOHOL/MO/W.PET/CERES
1000 CREAM (GRAM) TOPICAL DAILY
Status: DISCONTINUED | OUTPATIENT
Start: 2020-03-14 | End: 2020-03-15 | Stop reason: HOSPADM

## 2020-03-13 RX ORDER — AMLODIPINE BESYLATE 5 MG/1
5 TABLET ORAL 2 TIMES DAILY
Status: DISCONTINUED | OUTPATIENT
Start: 2020-03-13 | End: 2020-03-15 | Stop reason: HOSPADM

## 2020-03-13 RX ORDER — AMLODIPINE BESYLATE 5 MG/1
5 TABLET ORAL DAILY
COMMUNITY
End: 2020-07-01 | Stop reason: SDUPTHER

## 2020-03-13 RX ORDER — HYDRALAZINE HYDROCHLORIDE 25 MG/1
25 TABLET, FILM COATED ORAL 3 TIMES DAILY
Status: DISCONTINUED | OUTPATIENT
Start: 2020-03-13 | End: 2020-03-15 | Stop reason: HOSPADM

## 2020-03-13 RX ORDER — SODIUM CHLORIDE 0.9 % (FLUSH) 0.9 %
10 SYRINGE (ML) INJECTION EVERY 12 HOURS SCHEDULED
Status: DISCONTINUED | OUTPATIENT
Start: 2020-03-13 | End: 2020-03-15 | Stop reason: HOSPADM

## 2020-03-13 RX ORDER — MELATONIN
2000 DAILY
Status: DISCONTINUED | OUTPATIENT
Start: 2020-03-14 | End: 2020-03-15 | Stop reason: HOSPADM

## 2020-03-13 RX ORDER — WARFARIN SODIUM 1 MG/1
0.5 TABLET ORAL
Status: DISCONTINUED | OUTPATIENT
Start: 2020-03-13 | End: 2020-03-14

## 2020-03-13 RX ORDER — NICOTINE POLACRILEX 4 MG
15 LOZENGE BUCCAL
Status: DISCONTINUED | OUTPATIENT
Start: 2020-03-13 | End: 2020-03-15 | Stop reason: HOSPADM

## 2020-03-13 RX ORDER — HYDRALAZINE HYDROCHLORIDE 25 MG/1
25 TABLET, FILM COATED ORAL 3 TIMES DAILY
COMMUNITY
End: 2020-04-21

## 2020-03-13 RX ORDER — ONDANSETRON 2 MG/ML
4 INJECTION INTRAMUSCULAR; INTRAVENOUS EVERY 6 HOURS PRN
Status: DISCONTINUED | OUTPATIENT
Start: 2020-03-13 | End: 2020-03-15 | Stop reason: HOSPADM

## 2020-03-13 RX ORDER — ONDANSETRON 4 MG/1
4 TABLET, FILM COATED ORAL EVERY 6 HOURS PRN
Status: DISCONTINUED | OUTPATIENT
Start: 2020-03-13 | End: 2020-03-15 | Stop reason: HOSPADM

## 2020-03-13 RX ORDER — DULOXETIN HYDROCHLORIDE 60 MG/1
60 CAPSULE, DELAYED RELEASE ORAL DAILY
COMMUNITY
End: 2020-04-21

## 2020-03-13 RX ORDER — ATORVASTATIN CALCIUM 40 MG/1
40 TABLET, FILM COATED ORAL NIGHTLY
Status: DISCONTINUED | OUTPATIENT
Start: 2020-03-13 | End: 2020-03-15 | Stop reason: HOSPADM

## 2020-03-13 RX ADMIN — Medication 10 ML: at 21:21

## 2020-03-13 RX ADMIN — PREGABALIN 150 MG: 75 CAPSULE ORAL at 21:20

## 2020-03-13 RX ADMIN — AMLODIPINE BESYLATE 5 MG: 5 TABLET ORAL at 21:20

## 2020-03-13 RX ADMIN — ATORVASTATIN CALCIUM 40 MG: 40 TABLET, FILM COATED ORAL at 21:20

## 2020-03-13 RX ADMIN — TICAGRELOR 90 MG: 90 TABLET ORAL at 21:20

## 2020-03-13 RX ADMIN — HYDRALAZINE HYDROCHLORIDE 25 MG: 25 TABLET, FILM COATED ORAL at 21:20

## 2020-03-13 RX ADMIN — NITROGLYCERIN 1 INCH: 20 OINTMENT TOPICAL at 23:37

## 2020-03-14 ENCOUNTER — APPOINTMENT (OUTPATIENT)
Dept: GENERAL RADIOLOGY | Facility: HOSPITAL | Age: 65
End: 2020-03-14

## 2020-03-14 LAB
ANION GAP SERPL CALCULATED.3IONS-SCNC: 17 MMOL/L (ref 5–15)
BASOPHILS # BLD AUTO: 0 10*3/MM3 (ref 0–0.2)
BASOPHILS NFR BLD AUTO: 0.1 % (ref 0–1.5)
BUN BLD-MCNC: 44 MG/DL (ref 8–23)
BUN/CREAT SERPL: 6.7 (ref 7–25)
CALCIUM SPEC-SCNC: 7.5 MG/DL (ref 8.6–10.5)
CHLORIDE SERPL-SCNC: 97 MMOL/L (ref 98–107)
CO2 SERPL-SCNC: 22 MMOL/L (ref 22–29)
CREAT BLD-MCNC: 6.52 MG/DL (ref 0.57–1)
DEPRECATED RDW RBC AUTO: 60.4 FL (ref 37–54)
EOSINOPHIL # BLD AUTO: 0 10*3/MM3 (ref 0–0.4)
EOSINOPHIL NFR BLD AUTO: 0.3 % (ref 0.3–6.2)
ERYTHROCYTE [DISTWIDTH] IN BLOOD BY AUTOMATED COUNT: 18.3 % (ref 12.3–15.4)
GFR SERPL CREATININE-BSD FRML MDRD: 6 ML/MIN/1.73
GFR SERPL CREATININE-BSD FRML MDRD: ABNORMAL ML/MIN/{1.73_M2}
GLUCOSE BLD-MCNC: 159 MG/DL (ref 65–99)
GLUCOSE BLDC GLUCOMTR-MCNC: 103 MG/DL (ref 70–105)
GLUCOSE BLDC GLUCOMTR-MCNC: 132 MG/DL (ref 70–105)
HBA1C MFR BLD: 4.9 % (ref 3.5–5.6)
HCT VFR BLD AUTO: 29.7 % (ref 34–46.6)
HGB BLD-MCNC: 9.4 G/DL (ref 12–15.9)
INR PPP: 1.34 (ref 2–3)
INR PPP: 1.35 (ref 2–3)
LYMPHOCYTES # BLD AUTO: 0.4 10*3/MM3 (ref 0.7–3.1)
LYMPHOCYTES NFR BLD AUTO: 6.4 % (ref 19.6–45.3)
MAGNESIUM SERPL-MCNC: 1.9 MG/DL (ref 1.6–2.4)
MCH RBC QN AUTO: 29.4 PG (ref 26.6–33)
MCHC RBC AUTO-ENTMCNC: 31.5 G/DL (ref 31.5–35.7)
MCV RBC AUTO: 93.5 FL (ref 79–97)
MONOCYTES # BLD AUTO: 0.6 10*3/MM3 (ref 0.1–0.9)
MONOCYTES NFR BLD AUTO: 8.3 % (ref 5–12)
NEUTROPHILS # BLD AUTO: 5.9 10*3/MM3 (ref 1.7–7)
NEUTROPHILS NFR BLD AUTO: 84.9 % (ref 42.7–76)
NRBC BLD AUTO-RTO: 0 /100 WBC (ref 0–0.2)
PHOSPHATE SERPL-MCNC: 4.6 MG/DL (ref 2.5–4.5)
PLATELET # BLD AUTO: 362 10*3/MM3 (ref 140–450)
PMV BLD AUTO: 7.5 FL (ref 6–12)
POTASSIUM BLD-SCNC: 3.9 MMOL/L (ref 3.5–5.2)
PROTHROMBIN TIME: 13.4 SECONDS (ref 19.4–28.5)
PROTHROMBIN TIME: 13.5 SECONDS (ref 19.4–28.5)
RBC # BLD AUTO: 3.18 10*6/MM3 (ref 3.77–5.28)
SODIUM BLD-SCNC: 136 MMOL/L (ref 136–145)
TROPONIN T SERPL-MCNC: 0.26 NG/ML (ref 0–0.03)
TROPONIN T SERPL-MCNC: 0.28 NG/ML (ref 0–0.03)
TSH SERPL DL<=0.05 MIU/L-ACNC: 3.36 UIU/ML (ref 0.27–4.2)
WBC NRBC COR # BLD: 7 10*3/MM3 (ref 3.4–10.8)

## 2020-03-14 PROCEDURE — 85610 PROTHROMBIN TIME: CPT | Performed by: NURSE PRACTITIONER

## 2020-03-14 PROCEDURE — 84484 ASSAY OF TROPONIN QUANT: CPT | Performed by: NURSE PRACTITIONER

## 2020-03-14 PROCEDURE — 83735 ASSAY OF MAGNESIUM: CPT | Performed by: NURSE PRACTITIONER

## 2020-03-14 PROCEDURE — 85025 COMPLETE CBC W/AUTO DIFF WBC: CPT | Performed by: NURSE PRACTITIONER

## 2020-03-14 PROCEDURE — 80048 BASIC METABOLIC PNL TOTAL CA: CPT | Performed by: NURSE PRACTITIONER

## 2020-03-14 PROCEDURE — 99225 PR SBSQ OBSERVATION CARE/DAY 25 MINUTES: CPT | Performed by: INTERNAL MEDICINE

## 2020-03-14 PROCEDURE — 84100 ASSAY OF PHOSPHORUS: CPT | Performed by: NURSE PRACTITIONER

## 2020-03-14 PROCEDURE — 99214 OFFICE O/P EST MOD 30 MIN: CPT | Performed by: INTERNAL MEDICINE

## 2020-03-14 PROCEDURE — G0378 HOSPITAL OBSERVATION PER HR: HCPCS

## 2020-03-14 PROCEDURE — 71046 X-RAY EXAM CHEST 2 VIEWS: CPT

## 2020-03-14 PROCEDURE — 84443 ASSAY THYROID STIM HORMONE: CPT | Performed by: NURSE PRACTITIONER

## 2020-03-14 PROCEDURE — 82962 GLUCOSE BLOOD TEST: CPT

## 2020-03-14 PROCEDURE — G0257 UNSCHED DIALYSIS ESRD PT HOS: HCPCS

## 2020-03-14 RX ORDER — WARFARIN SODIUM 1 MG/1
0.5 TABLET ORAL
Status: DISCONTINUED | OUTPATIENT
Start: 2020-03-15 | End: 2020-03-15 | Stop reason: HOSPADM

## 2020-03-14 RX ORDER — MANNITOL 250 MG/ML
25 INJECTION, SOLUTION INTRAVENOUS AS NEEDED
Status: CANCELLED | OUTPATIENT
Start: 2020-03-14

## 2020-03-14 RX ORDER — LANTHANUM CARBONATE 500 MG/1
500 TABLET, CHEWABLE ORAL 2 TIMES DAILY WITH MEALS
COMMUNITY

## 2020-03-14 RX ORDER — WARFARIN SODIUM 2.5 MG/1
2.5 TABLET ORAL
Status: COMPLETED | OUTPATIENT
Start: 2020-03-14 | End: 2020-03-14

## 2020-03-14 RX ADMIN — CINACALCET 30 MG: 30 TABLET, FILM COATED ORAL at 09:00

## 2020-03-14 RX ADMIN — HYDROCODONE BITARTRATE AND ACETAMINOPHEN 1 TABLET: 10; 325 TABLET ORAL at 13:39

## 2020-03-14 RX ADMIN — TICAGRELOR 90 MG: 90 TABLET ORAL at 09:30

## 2020-03-14 RX ADMIN — AMLODIPINE BESYLATE 5 MG: 5 TABLET ORAL at 21:38

## 2020-03-14 RX ADMIN — Medication 10 ML: at 09:50

## 2020-03-14 RX ADMIN — ASPIRIN 81 MG: 81 TABLET, DELAYED RELEASE ORAL at 09:30

## 2020-03-14 RX ADMIN — NITROGLYCERIN 1 INCH: 20 OINTMENT TOPICAL at 05:46

## 2020-03-14 RX ADMIN — CYANOCOBALAMIN TAB 1000 MCG 1000 MCG: 1000 TAB at 09:30

## 2020-03-14 RX ADMIN — MELATONIN 2000 UNITS: at 09:30

## 2020-03-14 RX ADMIN — PREGABALIN 150 MG: 75 CAPSULE ORAL at 21:37

## 2020-03-14 RX ADMIN — WARFARIN SODIUM 2.5 MG: 2.5 TABLET ORAL at 05:48

## 2020-03-14 RX ADMIN — ACETAMINOPHEN 650 MG: 325 TABLET, FILM COATED ORAL at 01:42

## 2020-03-14 RX ADMIN — Medication 1 TABLET: at 09:50

## 2020-03-14 RX ADMIN — DULOXETINE HYDROCHLORIDE 60 MG: 30 CAPSULE, DELAYED RELEASE ORAL at 09:30

## 2020-03-14 RX ADMIN — ATORVASTATIN CALCIUM 40 MG: 40 TABLET, FILM COATED ORAL at 21:38

## 2020-03-14 RX ADMIN — NITROGLYCERIN 1 INCH: 20 OINTMENT TOPICAL at 13:39

## 2020-03-14 RX ADMIN — HYDRALAZINE HYDROCHLORIDE 25 MG: 25 TABLET, FILM COATED ORAL at 21:38

## 2020-03-14 RX ADMIN — TICAGRELOR 90 MG: 90 TABLET ORAL at 21:38

## 2020-03-14 RX ADMIN — Medication 10 ML: at 21:39

## 2020-03-14 NOTE — PROGRESS NOTES
[facility] Medicine Services Daily Progress Note      Hospitalist Team  LOS 0 days      Patient Care Team:  Naty Kraus DO as PCP - General (Family Medicine)  Naty Kraus DO as PCP - Claims Attributed  Anya Deleon as Technologist    Patient Location: 379      Subjective   Subjective     Chief Complaint / Subjective  No chief complaint on file.    F/u sob, elevated troponin    Brief Synopsis of Hospital Course/HPI  65-year-old female presents as a transfer from [facility] where she presented with a chief complaint of shortness of breath which started around 2:30 PM today.  The patient denies chest pain, cough, nausea or diaphoresis.  The patient has end-stage renal disease with dialysis times approximately 2 years with treatment .  The patient had partial treatment on Thursday because her mother  and she wanted to go to the  home.  The patient also had open heart surgery with artery bypass and mitral valve repair on 2020.  The patient reports she has been recovering well since her surgery.     Review of records from prior facility show chest x-ray dated 3/13/2020 at 1423 with comparison film 2020  Impression: #1 interval postsurgical changes as described above interval median sternotomy and cardiac valve replacement.  #2 there is cardiomegaly with mild pulmonary vascular congestion likely representing congestive heart failure.  #3 there is persistent right pleural effusion with increasing right basilar airspace disease compared to prior study.  Findings are suspicious for pneumonia as well.       EKG dated 3/13/2020 at 1423 shows sinus tachycardia with rate 100.  Borderline prolonged QT interval.  At the prior facility the patient received Zofran 4 mg x 2, Ativan 0.5 mg x 1 laboratory results dated 3/13/2020 at 1447 show WBC 10.42, hemoglobin 10.0, platelets 372; sodium 138, potassium 3.6, chloride 101, carbon  "dioxide 24.4, anion gap 12.6, BUN 46, creatinine 6.98, glucose 187, calcium 8.4, bilirubin total 1.2, ALT 6, AST 14, alkaline phosphate 112; troponin I 0.083; protein 6.7 albumin 2.9             3/14- Pt reports sob improved  Awaiting HD  - Discussed with cardiology plan to check echocardiogram      Review of Systems   Constitution: Negative for chills and fever.   Cardiovascular: Negative for chest pain.   Respiratory: Positive for shortness of breath. Negative for cough.    Gastrointestinal: Negative for abdominal pain, nausea and vomiting.         Objective   Objective      Vital Signs  Temp:  [97.5 °F (36.4 °C)-98 °F (36.7 °C)] 98 °F (36.7 °C)  Heart Rate:  [80-90] 82  Resp:  [16-18] 16  BP: (120-147)/(65-71) 135/71  Oxygen Therapy  SpO2: 99 %  Pulse Oximetry Type: Intermittent  Device (Oxygen Therapy): room air  Flowsheet Rows      First Filed Value   Admission Height  162.6 cm (64\") Documented at 03/13/2020 2007   Admission Weight  64.2 kg (141 lb 8.6 oz) Documented at 03/13/2020 2007        Intake & Output (last 3 days)       03/12 0701 - 03/13 0700 03/13 0701 - 03/14 0700 03/14 0701 - 03/15 0700    P.O.   840    Total Intake(mL/kg)   840 (13.1)    Net   +840               Lines, Drains & Airways    Active LDAs     Name:   Placement date:   Placement time:   Site:   Days:    Peripheral IV 03/13/20 2015 Posterior;Right Hand   03/13/20 2015    Hand   less than 1                  Physical Exam:    Physical Exam   Constitutional: She appears well-developed and well-nourished. No distress.   HENT:   Head: Normocephalic and atraumatic.   Mouth/Throat: Oropharynx is clear and moist.   Eyes: Pupils are equal, round, and reactive to light. No scleral icterus.   Cardiovascular: Normal rate, regular rhythm and intact distal pulses.   Pulmonary/Chest: Effort normal and breath sounds normal. No respiratory distress. She has no wheezes.   Abdominal: Soft. Bowel sounds are normal.   Neurological: She is alert.   Skin: " Skin is warm.   Psychiatric: She has a normal mood and affect. Her behavior is normal.   Vitals reviewed.            Procedures:              Results Review:     I reviewed the patient's new clinical results.      Lab Results (last 24 hours)     Procedure Component Value Units Date/Time    POC Glucose Once [[id number]]  (Abnormal) Collected:  03/14/20 1149    Specimen:  Blood Updated:  03/14/20 1150     Glucose 132 mg/dL      Comment: Serial Number: [id number]:  [id number]       Hemoglobin A1c [[id number]]  (Normal) Collected:  03/13/20 2046    Specimen:  Blood Updated:  03/14/20 1136     Hemoglobin A1C 4.9 %     Narrative:       Hemoglobin A1C Reference Range:    <5.7 %        Normal  5.7-6.4 %     Increased risk for diabetes  > 6.4 %        Diabetes       These guidelines have been recommended by the American Diabetic Association for Hgb A1c.      The following 2010 guidelines have been recommended by the American Diabetes Association for Hemoglobin A1c.    HBA1c 5.7-6.4% Increased risk for future diabetes (pre-diabetes)  HBA1c     >6.4% Diabetes      Troponin [[id number]]  (Abnormal) Collected:  03/14/20 0749    Specimen:  Blood Updated:  03/14/20 0850     Troponin T 0.275 ng/mL     Narrative:       Troponin T Reference Range:  <= 0.03 ng/mL-   Negative for AMI  >0.03 ng/mL-     Abnormal for myocardial necrosis.  Clinicians would have to utilize clinical acumen, EKG, Troponin and serial changes to determine if it is an Acute Myocardial Infarction or myocardial injury due to an underlying chronic condition.       Results may be falsely decreased if patient taking Biotin.      Protime-INR [[id number]]  (Abnormal) Collected:  03/14/20 0749    Specimen:  Blood Updated:  03/14/20 0831     Protime 13.5 Seconds      INR 1.35    POC Glucose Once [[id number]]  (Normal) Collected:  03/14/20 0706    Specimen:  Blood Updated:  03/14/20 0708     Glucose 103 mg/dL      Comment: Serial Number: [id number]:  [id number]         Troponin [[id number]]  (Abnormal) Collected:  03/14/20 0130    Specimen:  Blood Updated:  03/14/20 0226     Troponin T 0.259 ng/mL     Narrative:       Troponin T Reference Range:  <= 0.03 ng/mL-   Negative for AMI  >0.03 ng/mL-     Abnormal for myocardial necrosis.  Clinicians would have to utilize clinical acumen, EKG, Troponin and serial changes to determine if it is an Acute Myocardial Infarction or myocardial injury due to an underlying chronic condition.       Results may be falsely decreased if patient taking Biotin.      TSH [[id number]]  (Normal) Collected:  03/14/20 0130    Specimen:  Blood Updated:  03/14/20 0222     TSH 3.360 uIU/mL     Basic Metabolic Panel [[id number]]  (Abnormal) Collected:  03/14/20 0130    Specimen:  Blood Updated:  03/14/20 0216     Glucose 159 mg/dL      BUN 44 mg/dL      Creatinine 6.52 mg/dL      Sodium 136 mmol/L      Potassium 3.9 mmol/L      Chloride 97 mmol/L      CO2 22.0 mmol/L      Calcium 7.5 mg/dL      eGFR   Amer --     Comment: <15 Indicative of kidney failure.        eGFR Non African Amer 6 mL/min/1.73      Comment: <15 Indicative of kidney failure.        BUN/Creatinine Ratio 6.7     Anion Gap 17.0 mmol/L     Narrative:       GFR Normal >60  Chronic Kidney Disease <60  Kidney Failure <15      Magnesium [[id number]]  (Normal) Collected:  03/14/20 0130    Specimen:  Blood Updated:  03/14/20 0216     Magnesium 1.9 mg/dL     Phosphorus [[id number]]  (Abnormal) Collected:  03/14/20 0130    Specimen:  Blood Updated:  03/14/20 0216     Phosphorus 4.6 mg/dL     Protime-INR [[id number]]  (Abnormal) Collected:  03/14/20 0130    Specimen:  Blood Updated:  03/14/20 0211     Protime 13.4 Seconds      INR 1.34    CBC Auto Differential [[id number]]  (Abnormal) Collected:  03/14/20 0130    Specimen:  Blood Updated:  03/14/20 0153     WBC 7.00 10*3/mm3      RBC 3.18 10*6/mm3      Hemoglobin 9.4 g/dL      Hematocrit 29.7 %      MCV 93.5 fL      MCH 29.4 pg      MCHC 31.5 g/dL       RDW 18.3 %      RDW-SD 60.4 fl      MPV 7.5 fL      Platelets 362 10*3/mm3      Neutrophil % 84.9 %      Lymphocyte % 6.4 %      Monocyte % 8.3 %      Eosinophil % 0.3 %      Basophil % 0.1 %      Neutrophils, Absolute 5.90 10*3/mm3      Lymphocytes, Absolute 0.40 10*3/mm3      Monocytes, Absolute 0.60 10*3/mm3      Eosinophils, Absolute 0.00 10*3/mm3      Basophils, Absolute 0.00 10*3/mm3      nRBC 0.0 /100 WBC     POC Glucose Once [[id number]]  (Abnormal) Collected:  03/13/20 2154    Specimen:  Blood Updated:  03/13/20 2155     Glucose 179 mg/dL      Comment: Serial Number: [id number]:  [id number]       Troponin [[id number]]  (Abnormal) Collected:  03/13/20 2046    Specimen:  Blood Updated:  03/13/20 2148     Troponin T 0.297 ng/mL     Narrative:       Troponin T Reference Range:  <= 0.03 ng/mL-   Negative for AMI  >0.03 ng/mL-     Abnormal for myocardial necrosis.  Clinicians would have to utilize clinical acumen, EKG, Troponin and serial changes to determine if it is an Acute Myocardial Infarction or myocardial injury due to an underlying chronic condition.       Results may be falsely decreased if patient taking Biotin.      Basic Metabolic Panel [[id number]]  (Abnormal) Collected:  03/13/20 2046    Specimen:  Blood Updated:  03/13/20 2146     Glucose 188 mg/dL      BUN 43 mg/dL      Creatinine 6.49 mg/dL      Sodium 138 mmol/L      Potassium 4.1 mmol/L      Chloride 99 mmol/L      CO2 22.0 mmol/L      Calcium 8.3 mg/dL      eGFR   Amer --     Comment: <15 Indicative of kidney failure.        eGFR Non African Amer 6 mL/min/1.73      Comment: <15 Indicative of kidney failure.        BUN/Creatinine Ratio 6.6     Anion Gap 17.0 mmol/L     Narrative:       GFR Normal >60  Chronic Kidney Disease <60  Kidney Failure <15      CBC (No Diff) [[id number]]  (Abnormal) Collected:  03/13/20 2046    Specimen:  Blood Updated:  03/13/20 2118     WBC 8.30 10*3/mm3      RBC 3.22 10*6/mm3      Hemoglobin 9.9 g/dL       Hematocrit 29.9 %      MCV 93.1 fL      MCH 30.8 pg      MCHC 33.1 g/dL      RDW 18.4 %      RDW-SD 61.3 fl      MPV 7.8 fL      Platelets 349 10*3/mm3         Hemoglobin A1C   Date Value Ref Range Status   03/13/2020 4.9 3.5 - 5.6 % Final     Results from last 7 days   Lab Units 03/14/20  0749 03/14/20  0130   INR  1.35* 1.34*           No results found for: LIPASE  Lab Results   Component Value Date    CHOL 170 06/27/2018    TRIG 72 06/27/2018    HDL 59 06/27/2018    LDL 80 06/27/2018       Lab Results   Lab Value Date/Time    FINALDX  10/28/2019 1500     Pleural fluid, smears and cytospin preparation:    Benign mesothelial cells, histiocytes and scattered acute and chronic inflammatory cells    Negative for malignant cells      JPR/cec          FINALDX  10/28/2019 1500     No malignant cells identified.         Microbiology Results (last 10 days)     ** No results found for the last 240 hours. **          ECG/EMG Results (most recent)     None               Results for orders placed during the hospital encounter of 10/26/19   Adult Transthoracic Echo Complete W/ Cont if Necessary Per Protocol    Narrative · Left atrial cavity size is severely dilated.  · Severe mitral valve regurgitation is present       Normal LV size and contractility EF of 60 to 65%  Normal RV size  Severe left atrial enlargement  Normal right atrial size  Severe mitral annular calcification with calcified posterior mitral   leaflet and eccentric MR which appears to be severe seen  Aortic valve,  tricuspid valve appears structurally normal, no significant   regurgitation seen.  No pericardial effusion seen.  Proximal aorta appears normal in size.         Xr Chest Pa & Lateral    Result Date: 3/14/2020  IMPRESSION :  1. Status post sternotomy and surgical replacement of heart. 2. Moderate infiltrate pleural effusion right lower lobe along new since the previous study  Electronically Signed By-DR. Ra Yang MD On:3/14/2020 9:31 AM  This report was finalized on [id number] by DR. Ra Yang MD.          Xrays, labs reviewed personally by physician.    Medication Review:   I have reviewed the patient's current medication list      Scheduled Meds    amLODIPine 5 mg Oral BID   aspirin 81 mg Oral Daily   atorvastatin 40 mg Oral Nightly   cholecalciferol 2,000 Units Oral Daily   cinacalcet 30 mg Oral Daily   DULoxetine 60 mg Oral Daily   hydrALAZINE 25 mg Oral TID   insulin lispro 0-7 Units Subcutaneous 4x Daily With Meals & Nightly   losartan 50 mg Oral Daily   multivitamin b complex with c 1 tablet Oral Daily   nitroglycerin 1 inch Topical Q6H   pregabalin 150 mg Oral Nightly   sodium chloride 10 mL Intravenous Q12H   ticagrelor 90 mg Oral BID   vitamin B-12 1,000 mcg Oral Daily   [START ON 3/15/2020] warfarin 0.5 mg Oral Daily       Meds Infusions    Pharmacy to dose warfarin        Meds PRN  •  acetaminophen **OR** acetaminophen **OR** acetaminophen  •  dextrose  •  dextrose  •  glucagon (human recombinant)  •  HYDROcodone-acetaminophen  •  insulin lispro **AND** insulin lispro  •  ondansetron **OR** ondansetron  •  Pharmacy to dose warfarin  •  sodium chloride    I personally reviewed patient's x-ray films and my findings are    I personally reviewed patient's EKG strips and my findings are    Assessment/Plan   Assessment/Plan     Active Hospital Problems    Diagnosis  POA   • Elevated troponin [R79.89]  Yes      Resolved Hospital Problems   No resolved problems to display.       MEDICAL DECISION MAKING COMPLEXITY BY PROBLEM:     Indeterminately elevated troponin, uncertain etiology  - echo pending  - no chest pain  - discussed with carrdiologu     ESRD with dialysis T, Th, Sat  -  Nephrology consult  -  Continue Sensipar     Hypertension, chronic: Continue amlodipine, hydralazine, losartan     CAD s/p CABG and mitral valve repair  - Continue aspirin, Brilinta; continue warfarin with target INR 1.8-2; continue Norco;      HLD,  chronic: Continue Lipitor     Dietary supplementation: Continue Ibis-Ninoska; vitamin D3; continue vitamin B12    --Hx of gastric bypass 2013 with weight loss     Depression, chronic: Continue Cymbalta, Lyrica     Hyperglycemia, moderate glucose 187 with history of diabetes type 2 with improvement status post weight loss after gastric bypass: Check hemoglobin A1c; add sliding scale       VTE Prophylaxis -   Mechanical Order History:     None      Pharmalogical Order History:     Ordered     Dose Route Frequency Stop    03/14/20 0841  warfarin (COUMADIN) tablet 0.5 mg     Note to Pharmacy:  Mitral tissue valve 3 months tx   Question:  Target INR  Answer:  2 - 3    0.5 mg PO Daily Warfarin --    03/14/20 0402  warfarin (COUMADIN) tablet 2.5 mg     Question:  Target INR  Answer:  2 - 3    2.5 mg PO Once (Warfarin) 03/14/20 0548    03/13/20 2103  warfarin (COUMADIN) tablet 0.5 mg  Status:  Discontinued     Note to Pharmacy:  Mitral tissue valve 3 months tx   Question:  Target INR  Answer:  2 - 3    0.5 mg PO Daily Warfarin 03/14/20 0841    03/13/20 2024  enoxaparin (LOVENOX) syringe 30 mg  Status:  Discontinued      30 mg SC Every 24 Hours 03/13/20 2106 03/13/20 2103  Pharmacy to dose warfarin     Question:  Target INR  Answer:  2 - 3    -- XX Continuous PRN --            Code Status -   Code Status and Medical Interventions:   Ordered at: 03/13/20 2024     Code Status:    CPR     Medical Interventions (Level of Support Prior to Arrest):    Full       Discharge Planning          Destination      Coordination has not been started for this encounter.      Durable Medical Equipment      Coordination has not been started for this encounter.      Dialysis/Infusion      Coordination has not been started for this encounter.      Home Medical Care      Coordination has not been started for this encounter.      Therapy      Coordination has not been started for this encounter.      Community Resources      Coordination has not  been started for this encounter.            Electronically signed by Cherie Hopkins DO, 03/14/20, 19:16.  [facility] Hospitalist Team

## 2020-03-14 NOTE — H&P
[facility]      Patient Name: Michelle Howell  : 1955  MRN: [id number]  Primary Care Physician: Naty Kraus DO  Date of admission: 3/13/2020    Patient Care Team:  Naty Kraus DO as PCP - General (Family Medicine)  Naty Kraus DO as PCP - Claims Attributed  Anya Deleon as Technologist          Subjective   History Present Illness     Chief Complaint: No chief complaint on file.      Ms. Howell is a 65 y.o.  presents to [facility] complaining of elevated troponin; shortness of breath; missed dialysis         65-year-old female presents as a transfer from [facility] where she presented with a chief complaint of shortness of breath which started around 2:30 PM today.  The patient denies chest pain, cough, nausea or diaphoresis.  The patient has end-stage renal disease with dialysis times approximately 2 years with treatment .  The patient had partial treatment on Thursday because her mother  and she wanted to go to the  home.  The patient also had open heart surgery with artery bypass and mitral valve repair on 2020.  The patient reports she has been recovering well since her surgery.    Review of records from prior facility show chest x-ray dated 3/13/2020 at 1423 with comparison film 2020  Impression: #1 interval postsurgical changes as described above interval median sternotomy and cardiac valve replacement.  #2 there is cardiomegaly with mild pulmonary vascular congestion likely representing congestive heart failure.  #3 there is persistent right pleural effusion with increasing right basilar airspace disease compared to prior study.  Findings are suspicious for pneumonia as well.      EKG dated 3/13/2020 at 1423 shows sinus tachycardia with rate 100.  Borderline prolonged QT interval.  At the prior facility the patient received Zofran 4 mg x 2, Ativan 0.5 mg x 1 laboratory  results dated 3/13/2020 at 1447 show WBC 10.42, hemoglobin 10.0, platelets 372; sodium 138, potassium 3.6, chloride 101, carbon dioxide 24.4, anion gap 12.6, BUN 46, creatinine 6.98, glucose 187, calcium 8.4, bilirubin total 1.2, ALT 6, AST 14, alkaline phosphate 112; troponin I 0.083; protein 6.7 albumin 2.9    Surgical history Donald-en-Y done in 2013 converted to sleeve gastric bypass; hysterectomy; left ankle; AV fistula left arm; CABG with valve replacement, stent  Major depression, history of stroke given TPA 2018 ( patient denies hx of this and states that is her husbands history, not her's); diabetes type 2 (resolved since weight loss per patient); never smoker; her mother  and she went to the .      Review of Systems   Constitution: Negative for chills, diaphoresis and fever.   Cardiovascular: Positive for dyspnea on exertion. Negative for chest pain and leg swelling.   Respiratory: Positive for shortness of breath. Negative for cough.    Gastrointestinal: Negative for diarrhea and nausea.   Genitourinary: Negative for dysuria.   All other systems reviewed and are negative.        Personal History     Past Medical History:   Past Medical History:   Diagnosis Date   • Anxiety    • Arthritis     osteoarthritis   • ASVD (arteriosclerotic vascular disease)     Abdominal Aorta   • CAD (coronary artery disease)    • Chronic anemia     Anemia of Chronic Disease   • Elevated cholesterol    • ESRD (end stage renal disease) (CMS/HCC)     With HD on //SAT   • Fatty liver    • Gastritis     Non Erosive   • GERD (gastroesophageal reflux disease)    • Hiatal hernia    • Hypertension    • Peripheral neuropathy    • Seizure disorder (CMS/HCC)    • Type 2 diabetes mellitus (CMS/HCC)    • Urinary incontinence        Surgical History:      Past Surgical History:   Procedure Laterality Date   • ARTERIOVENOUS FISTULA REPAIR Left 2017    Revision of left brachiocephlaic fistula-----2017 Dr Floyd   • AV FISTULA  PLACEMENT, BRACHIOCEPHALIC  2016   • CARDIAC CATHETERIZATION  2005    Cardiac Cath with stent placement ---2005/2018 Dr Woodruff.   • CARDIAC CATHETERIZATION N/A 11/1/2019    Procedure: Left Heart Cath and coronary angiogram;  Surgeon: Esdras Woodruff MD;  Location: [facility] CATH INVASIVE LOCATION;  Service: Cardiovascular   • CARDIAC CATHETERIZATION N/A 11/1/2019    Procedure: Stent SIRIA coronary;  Surgeon: Esdras Woodruff MD;  Location: [facility] CATH INVASIVE LOCATION;  Service: Cardiovascular   • COLONOSCOPY  2004    2004, 2016   • CORONARY ANGIOPLASTY  2018   • ENDOSCOPY      x2   • GASTRIC BYPASS  2013    Converted to gastric sleeve 2014 due to non-healing ulcer   • LIVER BIOPSY     • ORIF ANKLE FRACTURE Left     Lt ankle Fx w/ORIF   • OTHER SURGICAL HISTORY Right 07/29/2011    Vitreous hemorrhage and retinal surgery right eye   • CT RT/LT HEART CATHETERS N/A 11/1/2019    Procedure: Percutaneous Coronary Intervention;  Surgeon: Esdras Woodurff MD;  Location: [facility] CATH INVASIVE LOCATION;  Service: Cardiovascular   • TOTAL ABDOMINAL HYSTERECTOMY      w/bladder susp/LSO           Family History: family history includes Cancer in her father; Coronary artery disease in her mother; Dementia in her mother; Diabetes in her father and mother; Heart disease in her mother; Hypertension in her mother. Otherwise pertinent FHx was reviewed and unremarkable.     Social History:  reports that she has never smoked. She has never used smokeless tobacco. She reports that she does not drink alcohol or use drugs.      Medications:  Prior to Admission medications    Medication Sig Start Date End Date Taking? Authorizing Provider   amLODIPine (NORVASC) 5 MG tablet Take 5 mg by mouth 2 (Two) Times a Day.   Yes Provider, MD Raad   aspirin 81 MG EC tablet Take 81 mg by mouth Daily.   Yes Provider, MD Raad   atorvastatin (LIPITOR) 20 MG tablet Take 1 tablet by mouth Every Night.  Patient taking differently: Take 40 mg by mouth  Every Night. 11/5/19  Yes Cherie Hopkins DO   B Complex-C-Folic Acid (DE-NICKY) tablet Take 1 tablet by mouth Daily. 10/14/19  Yes Naty Kraus DO   cholecalciferol (VITAMIN D3) 1000 units tablet Take 2 tablets by mouth Daily. 3/14/16  Yes Raad Dumont MD   cinacalcet (SENSIPAR) 30 MG tablet Take 1 tablet by mouth Daily. 7/12/18  Yes Raad Dumont MD   DULoxetine (CYMBALTA) 60 MG capsule Take 60 mg by mouth Daily.   Yes Raad Dumont MD   hydrALAZINE (APRESOLINE) 25 MG tablet Take 25 mg by mouth 3 (Three) Times a Day.   Yes Raad Dumont MD   losartan (COZAAR) 50 MG tablet Take 50 mg by mouth Daily.   Yes Raad Dumont MD   LYRICA 150 MG capsule Take 1 capsule by mouth every night at bedtime. 10/14/19  Yes Naty Kraus DO   ticagrelor (BRILINTA) 90 MG tablet tablet Take 90 mg by mouth 2 (Two) Times a Day.   Yes Raad Dumont MD   vitamin B-12 (CYANOCOBALAMIN) 1000 MCG tablet Take 1 tablet by mouth Daily. 10/11/18  Yes Raad Dumont MD   warfarin (COUMADIN) 1 MG tablet Take 0.5 mg by mouth Daily.   Yes Raad Dumont MD   HYDROcodone-acetaminophen (NORCO)  MG per tablet Take 1 tablet by mouth Every 6 (Six) Hours As Needed for Moderate Pain .    Raad Dumont MD   ALPRAZolam (XANAX) 1 MG tablet Take 1 tablet by mouth Daily As Needed for Anxiety. 10/14/19 3/13/20  Naty Kraus DO   buPROPion XL (WELLBUTRIN XL) 150 MG 24 hr tablet Take 150 mg by mouth Daily.  3/13/20  Raad Dumont MD   clopidogrel (PLAVIX) 75 MG tablet Take 1 tablet by mouth Daily. 11/4/19 3/13/20  Shiva Marks MD   cyclobenzaprine (FLEXERIL) 10 MG tablet Take 0.5-1 tablets by mouth Daily As Needed for Muscle Spasms. 10/14/19 3/13/20  Naty Kraus DO   dicyclomine (BENTYL) 10 MG capsule Take 10 mg by mouth 2 (Two) Times a Day.  3/13/20  Provider, MD Raad   lanthanum (FOSRENOL) 500 MG chewable tablet Chew 1-3 tablets 3 (Three)  Times a Day. With meal 10/14/19 3/13/20  Naty Kraus,    levETIRAcetam (KEPPRA) 500 MG tablet Take 500 mg by mouth 2 (Two) Times a Day.  3/13/20  ProviderRaad MD   metoprolol tartrate (LOPRESSOR) 25 MG tablet Take 0.5 tablets by mouth Every 12 (Twelve) Hours. 12/30/19 3/13/20  Naty Kraus DO   nitroglycerin (NITROSTAT) 0.4 MG SL tablet Place 1 tablet under the tongue Every 5 (Five) Minutes As Needed for Chest Pain. Take no more than 3 doses in 15 minutes. 11/5/19 3/13/20  Cherie Hopkins DO   ondansetron (ZOFRAN) 4 MG tablet Take 1 tablet by mouth Every 8 (Eight) Hours As Needed. 2/28/17 3/13/20  ProviderRaad MD       Allergies:  No Known Allergies    Objective   Objective     Vital Signs  Temp:  [97.5 °F (36.4 °C)] 97.5 °F (36.4 °C)  Heart Rate:  [90] 90  Resp:  [16] 16  BP: (147)/(68) 147/68  SpO2:  [95 %] 95 %  on   ;   Device (Oxygen Therapy): room air  Body mass index is 24.29 kg/m².    Physical Exam   Constitutional: She is oriented to person, place, and time. She appears well-developed and well-nourished. No distress.   HENT:   Head: Normocephalic and atraumatic.   Eyes: Pupils are equal, round, and reactive to light. EOM are normal. No scleral icterus.   Neck: Normal range of motion. Neck supple. No JVD present. No tracheal deviation present. No thyromegaly present.   Cardiovascular: Normal rate, regular rhythm, normal heart sounds and intact distal pulses.   No murmur heard.  Pulmonary/Chest: Effort normal. No respiratory distress. She has rales.   Abdominal: Soft. Bowel sounds are normal. She exhibits no distension.   Musculoskeletal: Normal range of motion. She exhibits no edema.   Lymphadenopathy:     She has no cervical adenopathy.   Neurological: She is alert and oriented to person, place, and time.   Skin: Skin is warm and dry. Capillary refill takes less than 2 seconds. She is not diaphoretic.   Psychiatric: She has a normal mood and affect. Her behavior is normal.  Judgment and thought content normal.   Vitals reviewed.      Results Review:  I have personally reviewed most recent cardiac tracings, lab results and radiology images and interpretations and agree with findings, most notably: Indeterminately elevated troponin; right pleural effusion.              Invalid input(s):  ALKPHOS  CrCl cannot be calculated (Patient's most recent lab result is older than the maximum 30 days allowed.).  Brief Urine Lab Results     None          Microbiology Results (last 10 days)     ** No results found for the last 240 hours. **          ECG/EMG Results (most recent)     None               Results for orders placed during the hospital encounter of 10/26/19   Adult Transthoracic Echo Complete W/ Cont if Necessary Per Protocol    Narrative · Left atrial cavity size is severely dilated.  · Severe mitral valve regurgitation is present       Normal LV size and contractility EF of 60 to 65%  Normal RV size  Severe left atrial enlargement  Normal right atrial size  Severe mitral annular calcification with calcified posterior mitral   leaflet and eccentric MR which appears to be severe seen  Aortic valve,  tricuspid valve appears structurally normal, no significant   regurgitation seen.  No pericardial effusion seen.  Proximal aorta appears normal in size.         No radiology results for the last 7 days      CrCl cannot be calculated (Patient's most recent lab result is older than the maximum 30 days allowed.).    Assessment/Plan   Assessment/Plan       Active Hospital Problems    Diagnosis  POA   • Elevated troponin [R79.89]  Yes      Resolved Hospital Problems   No resolved problems to display.     Indeterminately elevated troponin, uncertain etiology--consideration for cardiac stress related fluid volume overload: serial troponin    ESRD, chronic--with dialysis T, Th, Sat: Nephrology consult; Continue Sensipar    Hypertension, chronic: Continue amlodipine, hydralazine, losartan    CAD,  chronic--Recent CABG and mitral valve repair: Continue aspirin, Brilinta; continue warfarin with target INR 1.8-2; continue Norco;     HLD, chronic: Continue Lipitor    Dietary supplementation: Continue Ibis-Ninoska; vitamin D3; continue vitamin B12    --Hx of gastric bypass 2013 with weight loss    Depression, chronic: Continue Cymbalta, Lyrica    Hyperglycemia, moderate glucose 187 with history of diabetes type 2 with improvement status post weight loss after gastric bypass: Check hemoglobin A1c; add sliding scale      VTE Prophylaxis -   Mechanical Order History:     None      Pharmalogical Order History:     Ordered     Dose Route Frequency Stop    03/13/20 2024  enoxaparin (LOVENOX) syringe 30 mg      30 mg SC Every 24 Hours --          CODE STATUS:    Code Status and Medical Interventions:   Ordered at: 03/13/20 2024     Code Status:    CPR     Medical Interventions (Level of Support Prior to Arrest):    Full         I discussed the patient's findings and my recommendations with patient and family.        Electronically signed by JEFFERY Hernandez, 03/13/20, 8:24 PM.  [facility] Hospitalist Team

## 2020-03-14 NOTE — CONSULTS
"  Referring Provider: Cherie Hopkins DO  Reason for Consultation: Elevated troponin     Patient Care Team:  Naty Kraus DO as PCP - General (Family Medicine)  Naty Kraus DO as PCP - Claims Attributed  Anya Deleon as Technologist    Chief complaint - shortness of breath    Subjective .     History of present illness:  Michelle Howell is a 65 y.o. female who presents with sudden shortness of breath. She was transfered from [facility] with shortness of breath due.  She reports she took her pills yesterday morning then vomited then became short of breath. Patient denies chest pain, cough, nausea or diaphoresis.  Patient reports CABG X1 and mitral valve replacement at [facility] on 2/14/2020, she has recovered well until now.  Patient is on HD and only completed 2.5 hrs on Thursday because her mother passed away.  Patient's records from outlying facility reviewed chest x-ray dated 3/13/2020  \"cardiomegaly with mild pulmonary vascular congestion likely representing congestive heart failure. there is persistent right pleural effusion with increasing right basilar airspace disease compared to prior study.  Findings are suspicious for pneumonia as well\".  Patient reports this is the first time she has ever experienced lower extremity edema which started yesterday.      EKG  shows sinus tachycardia with rate 100.  Borderline prolonged QT interval.    Troponin is mildly elevated 0.275            ROS        Review of systems negative except as indicated above in HPI      History  Past Medical History:   Diagnosis Date   • Anxiety    • Arthritis     osteoarthritis   • ASVD (arteriosclerotic vascular disease)     Abdominal Aorta   • CAD (coronary artery disease)    • Chronic anemia     Anemia of Chronic Disease   • Elevated cholesterol    • ESRD (end stage renal disease) (CMS/Formerly [facility])     With HD on TU/TH/SAT   • Fatty liver    • Gastritis     Non Erosive   • GERD (gastroesophageal reflux disease)    • " Hiatal hernia    • Hypertension    • Peripheral neuropathy    • Seizure disorder (CMS/HCC)    • Type 2 diabetes mellitus (CMS/formerly [facility])    • Urinary incontinence        Past Surgical History:   Procedure Laterality Date   • ARTERIOVENOUS FISTULA REPAIR Left 2017    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   • CARDIAC CATHETERIZATION N/A 11/1/2019    Procedure: Left Heart Cath and coronary angiogram;  Surgeon: Esdras Woodruff MD;  Location:  [facility] CATH INVASIVE LOCATION;  Service: Cardiovascular   • CARDIAC CATHETERIZATION N/A 11/1/2019    Procedure: Stent SIRIA coronary;  Surgeon: Esdras Woodruff MD;  Location: [facility] CATH INVASIVE LOCATION;  Service: Cardiovascular   • COLONOSCOPY  2004    2004, 2016   • CORONARY ANGIOPLASTY  2018   • ENDOSCOPY      x2   • GASTRIC BYPASS  2013    Converted to gastric sleeve 2014 due to non-healing ulcer   • LIVER BIOPSY     • ORIF ANKLE FRACTURE Left     Lt ankle Fx w/ORIF   • OTHER SURGICAL HISTORY Right 07/29/2011    Vitreous hemorrhage and retinal surgery right eye   • MT RT/LT HEART CATHETERS N/A 11/1/2019    Procedure: Percutaneous Coronary Intervention;  Surgeon: Esdras Woodruff MD;  Location: [facility] CATH INVASIVE LOCATION;  Service: Cardiovascular   • TOTAL ABDOMINAL HYSTERECTOMY      w/bladder susp/LSO       Family History   Problem Relation Age of Onset   • Diabetes Mother    • Hypertension Mother    • Coronary artery disease Mother    • Dementia Mother    • Heart disease Mother    • Diabetes Father    • Cancer Father         Lung Cancer       Social History     Tobacco Use   • Smoking status: Never Smoker   • Smokeless tobacco: Never Used   Substance Use Topics   • Alcohol use: No     Frequency: Never   • Drug use: No        Medications Prior to Admission   Medication Sig Dispense Refill Last Dose   • amLODIPine (NORVASC)  5 MG tablet Take 5 mg by mouth 2 (Two) Times a Day.   3/13/2020 at Unknown time   • aspirin 81 MG EC tablet Take 81 mg by mouth Daily.   3/13/2020 at Unknown time   • atorvastatin (LIPITOR) 20 MG tablet Take 1 tablet by mouth Every Night. (Patient taking differently: Take 40 mg by mouth Every Night.) 30 tablet 0 3/12/2020 at Unknown time   • B Complex-C-Folic Acid (DE-NICKY) tablet Take 1 tablet by mouth Daily. 90 each 3 3/13/2020 at Unknown time   • cholecalciferol (VITAMIN D3) 1000 units tablet Take 2 tablets by mouth Daily.   3/13/2020 at Unknown time   • cinacalcet (SENSIPAR) 30 MG tablet Take 1 tablet by mouth Daily.   3/13/2020 at Unknown time   • DULoxetine (CYMBALTA) 60 MG capsule Take 60 mg by mouth Daily.   3/13/2020 at Unknown time   • hydrALAZINE (APRESOLINE) 25 MG tablet Take 25 mg by mouth 3 (Three) Times a Day.   3/13/2020 at Unknown time   • lanthanum (FOSRENOL) 500 MG chewable tablet Chew 500 mg 2 (Two) Times a Day With Meals.      • losartan (COZAAR) 50 MG tablet Take 50 mg by mouth Daily.   3/13/2020 at Unknown time   • LYRICA 150 MG capsule Take 1 capsule by mouth every night at bedtime. 90 capsule 1 3/12/2020 at Unknown time   • ticagrelor (BRILINTA) 90 MG tablet tablet Take 90 mg by mouth 2 (Two) Times a Day.   3/13/2020 at Unknown time   • vitamin B-12 (CYANOCOBALAMIN) 1000 MCG tablet Take 1 tablet by mouth Daily.   3/13/2020 at Unknown time   • warfarin (COUMADIN) 1 MG tablet Take 0.5 mg by mouth Daily.   3/13/2020 at Unknown time   • HYDROcodone-acetaminophen (NORCO)  MG per tablet Take 1 tablet by mouth Every 6 (Six) Hours As Needed for Moderate Pain .   More than a month at Unknown time         Patient has no known allergies.    Scheduled Meds:  amLODIPine 5 mg Oral BID   aspirin 81 mg Oral Daily   atorvastatin 40 mg Oral Nightly   cholecalciferol 2,000 Units Oral Daily   cinacalcet 30 mg Oral Daily   DULoxetine 60 mg Oral Daily   hydrALAZINE 25 mg Oral TID   insulin lispro 0-7  "Units Subcutaneous 4x Daily With Meals & Nightly   losartan 50 mg Oral Daily   multivitamin b complex with c 1 tablet Oral Daily   nitroglycerin 1 inch Topical Q6H   pregabalin 150 mg Oral Nightly   sodium chloride 10 mL Intravenous Q12H   ticagrelor 90 mg Oral BID   vitamin B-12 1,000 mcg Oral Daily   [START ON 3/15/2020] warfarin 0.5 mg Oral Daily     Continuous Infusions:  Pharmacy to dose warfarin      PRN Meds:.•  acetaminophen **OR** acetaminophen **OR** acetaminophen  •  dextrose  •  dextrose  •  glucagon (human recombinant)  •  HYDROcodone-acetaminophen  •  insulin lispro **AND** insulin lispro  •  ondansetron **OR** ondansetron  •  Pharmacy to dose warfarin  •  sodium chloride    Objective     VITAL SIGNS  Vitals:    03/13/20 2007 03/14/20 0411   BP: 147/68 138/69   BP Location: Right arm Right arm   Patient Position: Lying Lying   Pulse: 90 80   Resp: 16 17   Temp: 97.5 °F (36.4 °C) 97.7 °F (36.5 °C)   TempSrc: Oral Oral   SpO2: 95% 98%   Weight: 64.2 kg (141 lb 8.6 oz)    Height: 162.6 cm (64\")        Flowsheet Rows      First Filed Value   Admission Height  162.6 cm (64\") Documented at 03/13/2020 2007   Admission Weight  64.2 kg (141 lb 8.6 oz) Documented at 03/13/2020 2007            Intake/Output Summary (Last 24 hours) at 3/14/2020 1118  Last data filed at 3/14/2020 1033  Gross per 24 hour   Intake 480 ml   Output --   Net 480 ml        TELEMETRY: normal sinus rhythm     Physical Exam:  The patient is alert, oriented and in no distress. On room air   Vital signs as noted above.  Head and neck revealed no jugular venous distention.  No thyromegaly or lymph adenopathy is present  Lungs with diminished airway entry.  Heart normal first and second heart sounds.murmur noted.  No precordial rub is present.  No gallop is present. Surgical incision healing well  Abdomen soft and nontender.  No organomegaly is present.  Extremities with good peripheral pulses with 2+ pedal edema.  Skin warm and " dry.  Musculoskeletal system is grossly normal  CNS grossly normal      Results Review:   I reviewed the patient's new clinical results.  Lab Results (last 24 hours)     Procedure Component Value Units Date/Time    Troponin [[id number]]  (Abnormal) Collected:  03/14/20 0749    Specimen:  Blood Updated:  03/14/20 0850     Troponin T 0.275 ng/mL     Narrative:       Troponin T Reference Range:  <= 0.03 ng/mL-   Negative for AMI  >0.03 ng/mL-     Abnormal for myocardial necrosis.  Clinicians would have to utilize clinical acumen, EKG, Troponin and serial changes to determine if it is an Acute Myocardial Infarction or myocardial injury due to an underlying chronic condition.       Results may be falsely decreased if patient taking Biotin.      Protime-INR [[id number]]  (Abnormal) Collected:  03/14/20 0749    Specimen:  Blood Updated:  03/14/20 0831     Protime 13.5 Seconds      INR 1.35    POC Glucose Once [[id number]]  (Normal) Collected:  03/14/20 0706    Specimen:  Blood Updated:  03/14/20 0708     Glucose 103 mg/dL      Comment: Serial Number: [id number]:  [id number]       Troponin [[id number]]  (Abnormal) Collected:  03/14/20 0130    Specimen:  Blood Updated:  03/14/20 0226     Troponin T 0.259 ng/mL     Narrative:       Troponin T Reference Range:  <= 0.03 ng/mL-   Negative for AMI  >0.03 ng/mL-     Abnormal for myocardial necrosis.  Clinicians would have to utilize clinical acumen, EKG, Troponin and serial changes to determine if it is an Acute Myocardial Infarction or myocardial injury due to an underlying chronic condition.       Results may be falsely decreased if patient taking Biotin.      TSH [[id number]]  (Normal) Collected:  03/14/20 0130    Specimen:  Blood Updated:  03/14/20 0222     TSH 3.360 uIU/mL     Basic Metabolic Panel [[id number]]  (Abnormal) Collected:  03/14/20 0130    Specimen:  Blood Updated:  03/14/20 0216     Glucose 159 mg/dL      BUN 44 mg/dL      Creatinine 6.52 mg/dL      Sodium 136  mmol/L      Potassium 3.9 mmol/L      Chloride 97 mmol/L      CO2 22.0 mmol/L      Calcium 7.5 mg/dL      eGFR   Amer --     Comment: <15 Indicative of kidney failure.        eGFR Non African Amer 6 mL/min/1.73      Comment: <15 Indicative of kidney failure.        BUN/Creatinine Ratio 6.7     Anion Gap 17.0 mmol/L     Narrative:       GFR Normal >60  Chronic Kidney Disease <60  Kidney Failure <15      Magnesium [[id number]]  (Normal) Collected:  03/14/20 0130    Specimen:  Blood Updated:  03/14/20 0216     Magnesium 1.9 mg/dL     Phosphorus [[id number]]  (Abnormal) Collected:  03/14/20 0130    Specimen:  Blood Updated:  03/14/20 0216     Phosphorus 4.6 mg/dL     Protime-INR [[id number]]  (Abnormal) Collected:  03/14/20 0130    Specimen:  Blood Updated:  03/14/20 0211     Protime 13.4 Seconds      INR 1.34    CBC Auto Differential [[id number]]  (Abnormal) Collected:  03/14/20 0130    Specimen:  Blood Updated:  03/14/20 0153     WBC 7.00 10*3/mm3      RBC 3.18 10*6/mm3      Hemoglobin 9.4 g/dL      Hematocrit 29.7 %      MCV 93.5 fL      MCH 29.4 pg      MCHC 31.5 g/dL      RDW 18.3 %      RDW-SD 60.4 fl      MPV 7.5 fL      Platelets 362 10*3/mm3      Neutrophil % 84.9 %      Lymphocyte % 6.4 %      Monocyte % 8.3 %      Eosinophil % 0.3 %      Basophil % 0.1 %      Neutrophils, Absolute 5.90 10*3/mm3      Lymphocytes, Absolute 0.40 10*3/mm3      Monocytes, Absolute 0.60 10*3/mm3      Eosinophils, Absolute 0.00 10*3/mm3      Basophils, Absolute 0.00 10*3/mm3      nRBC 0.0 /100 WBC     POC Glucose Once [[id number]]  (Abnormal) Collected:  03/13/20 2154    Specimen:  Blood Updated:  03/13/20 2155     Glucose 179 mg/dL      Comment: Serial Number: [id number]:  [id number]       Troponin [[id number]]  (Abnormal) Collected:  03/13/20 2046    Specimen:  Blood Updated:  03/13/20 2148     Troponin T 0.297 ng/mL     Narrative:       Troponin T Reference Range:  <= 0.03 ng/mL-   Negative for AMI  >0.03 ng/mL-      Abnormal for myocardial necrosis.  Clinicians would have to utilize clinical acumen, EKG, Troponin and serial changes to determine if it is an Acute Myocardial Infarction or myocardial injury due to an underlying chronic condition.       Results may be falsely decreased if patient taking Biotin.      Basic Metabolic Panel [[id number]]  (Abnormal) Collected:  03/13/20 2046    Specimen:  Blood Updated:  03/13/20 2146     Glucose 188 mg/dL      BUN 43 mg/dL      Creatinine 6.49 mg/dL      Sodium 138 mmol/L      Potassium 4.1 mmol/L      Chloride 99 mmol/L      CO2 22.0 mmol/L      Calcium 8.3 mg/dL      eGFR   Amer --     Comment: <15 Indicative of kidney failure.        eGFR Non African Amer 6 mL/min/1.73      Comment: <15 Indicative of kidney failure.        BUN/Creatinine Ratio 6.6     Anion Gap 17.0 mmol/L     Narrative:       GFR Normal >60  Chronic Kidney Disease <60  Kidney Failure <15      CBC (No Diff) [[id number]]  (Abnormal) Collected:  03/13/20 2046    Specimen:  Blood Updated:  03/13/20 2118     WBC 8.30 10*3/mm3      RBC 3.22 10*6/mm3      Hemoglobin 9.9 g/dL      Hematocrit 29.9 %      MCV 93.1 fL      MCH 30.8 pg      MCHC 33.1 g/dL      RDW 18.4 %      RDW-SD 61.3 fl      MPV 7.8 fL      Platelets 349 10*3/mm3     Hemoglobin A1c [[id number]] Collected:  03/13/20 2046    Specimen:  Blood Updated:  03/13/20 2115          Imaging Results (Last 24 Hours)     Procedure Component Value Units Date/Time    XR Chest PA & Lateral [[id number]] Collected:  03/14/20 0929     Updated:  03/14/20 0933    Narrative:          DATE OF EXAM:   3/14/2020 9:00 AM     PROCEDURE:   XR CHEST PA AND LATERAL-     INDICATIONS:   plural effusion heart surgery on 02/14/2020. Shortness of air cough  today. Pleural effusion.     COMPARISON:  10/28/2019     TECHNIQUE:   Single frontal view chest     FINDINGS:   Today's study reveals the patient is status post sternotomy and surgical  replacement of heart valve. The heart is mildly  enlarged and is  unchanged. Superior mediastinum is normal. There is a moderate sized  infiltrate pleural effusion right lower lobe along. No acute focal  arising left lung.        Impression:       IMPRESSION :      1. Status post sternotomy and surgical replacement of heart.  2. Moderate infiltrate pleural effusion right lower lobe along new since  the previous study     Electronically Signed By-DR. Ra Yang MD On:3/14/2020 9:31  AM  This report was finalized on [id number] by DR. Ra Yang MD.      LAB RESULTS (LAST 7 DAYS)    CBC  Results from last 7 days   Lab Units 03/14/20  0130 03/13/20  2046   WBC 10*3/mm3 7.00 8.30   RBC 10*6/mm3 3.18* 3.22*   HEMOGLOBIN g/dL 9.4* 9.9*   HEMATOCRIT % 29.7* 29.9*   MCV fL 93.5 93.1   PLATELETS 10*3/mm3 362 349       BMP  Results from last 7 days   Lab Units 03/14/20  0130 03/13/20  2046   SODIUM mmol/L 136 138   POTASSIUM mmol/L 3.9 4.1   CHLORIDE mmol/L 97* 99   CO2 mmol/L 22.0 22.0   BUN mg/dL 44* 43*   CREATININE mg/dL 6.52* 6.49*   GLUCOSE mg/dL 159* 188*   MAGNESIUM mg/dL 1.9  --    PHOSPHORUS mg/dL 4.6*  --        CMP Results from last 7 days   Lab Units 03/14/20 0130 03/13/20  2046   SODIUM mmol/L 136 138   POTASSIUM mmol/L 3.9 4.1   CHLORIDE mmol/L 97* 99   CO2 mmol/L 22.0 22.0   BUN mg/dL 44* 43*   CREATININE mg/dL 6.52* 6.49*   GLUCOSE mg/dL 159* 188*         BNP        TROPONIN  Results from last 7 days   Lab Units 03/14/20  0749   TROPONIN T ng/mL 0.275*       CoAg  Results from last 7 days   Lab Units 03/14/20  0749 03/14/20 0130   INR  1.35* 1.34*       Creatinine Clearance  Estimated Creatinine Clearance: 8.7 mL/min (A) (by C-G formula based on SCr of 6.52 mg/dL (H)).    ABG        Radiology  Xr Chest Pa & Lateral    Result Date: 3/14/2020  IMPRESSION :  1. Status post sternotomy and surgical replacement of heart. 2. Moderate infiltrate pleural effusion right lower lobe along new since the previous study  Electronically Signed  By-DR. Ra Yang MD On:3/14/2020 9:31 AM This report was finalized on [id number] by DR. Ra Yang MD.              EKG      I personally viewed and interpreted the patient's EKG/Telemetry data:    ECHOCARDIOGRAM:    Results for orders placed during the hospital encounter of 10/26/19   Adult Transthoracic Echo Complete W/ Cont if Necessary Per Protocol    Narrative · Left atrial cavity size is severely dilated.  · Severe mitral valve regurgitation is present       Normal LV size and contractility EF of 60 to 65%  Normal RV size  Severe left atrial enlargement  Normal right atrial size  Severe mitral annular calcification with calcified posterior mitral   leaflet and eccentric MR which appears to be severe seen  Aortic valve,  tricuspid valve appears structurally normal, no significant   regurgitation seen.  No pericardial effusion seen.  Proximal aorta appears normal in size.                 Cardiolite (Tc-99m Sestamibi) stress test      OTHER:     Assessment/Plan     Active Problems:    Elevated troponin  history CAD/CABG X1-- stent to mid LAD (Nov 1, 2019)  Mitral valve replacement 2/14/2020 At [facility]   Hypertension   ESRD on HD (T/Th/Sa)     Surgical history Donald-en-Y done in 2013 converted to sleeve gastric bypass; hysterectomy; left ankle; AV fistula left arm; CABG with valve replacement, stent  Major depression, history of stroke given TPA 6/2018   diabetes type 2 (resolved since weight loss per patient)      Plan:  Patient presented to outlying facility with dyspnea   Patient has elevated troponin consistent with Non STEMI vs demand ischemia (volume overload and recent surgery) no chest pain   Patient reported only partial HD on Thursday   Patient will have HD today for fluid removal   Patient with fluid overfluid, pleural effusion (previous need for thoracentesis in Nov 2019)  Echocardiogram ordered to assess mitral valve   Continue home medications       Further  recommendations per Dr. Lennon   Of note patient wants to go home tomorrow for mothers  on Monday    Discussed with patient, family and RN  Discussed with DR. Hopkins        [[[[[[[[[[[[[[[[[[[[    Please see my consultation note    ]]]]]]]]]]]]]]]]]]]]  JOEY Alex  20  11:18   Electronically signed by JOEY Alex, 20, 12:00 PM.

## 2020-03-14 NOTE — CONSULTS
Name: Michelle Howell  Age: 65 y.o.  : 1955  Sex: female    20    Reason for Consultation:  Management of end-stage renal disease    Chief Complaint:  Shortness of breath    History of Present Illness :  Patient is a 65-year-old female with end-stage renal disease on hemodialysis.  Patient also has a history of hypertension, diabetes, coronary artery disease with history of CABG and aortic valve replacement history of CVA, history of gastric bypass     Patient was unable to go to hemodialysis treatment today.  She only received a short treatment on Thursday.  Patient has been grieving the loss of her mother recently.    On admission she had a serum potassium which was 3.9.  Hemoglobin was 9.4.  Chest x-ray showed a right pleural effusion/infiltrate    Review of Systems    Review of Systems   Constitutional: Negative for appetite change, diaphoresis, fatigue and fever.   HENT: Negative for congestion, dental problem, ear discharge, ear pain, facial swelling, mouth sores, nosebleeds and sore throat.    Eyes: Negative for pain, discharge, redness and visual disturbance.   Respiratory: Positive for shortness of breath. Negative for cough, chest tightness and wheezing.    Cardiovascular: Negative for chest pain, palpitations and leg swelling.   Gastrointestinal: Negative for abdominal distention, abdominal pain, blood in stool, constipation, diarrhea, nausea and vomiting.   Endocrine: Negative for polydipsia, polyphagia and polyuria.   Genitourinary: Negative for difficulty urinating, dysuria, flank pain, frequency, hematuria, menstrual problem, pelvic pain, vaginal bleeding, vaginal discharge and vaginal pain.   Musculoskeletal: Negative for arthralgias, back pain, joint swelling, myalgias, neck pain and neck stiffness.   Skin: Negative for color change, pallor, rash and wound.   Neurological: Negative for dizziness, tremors, seizures, syncope, speech difficulty, weakness, light-headedness, numbness and  headaches.   Hematological: Negative for adenopathy. Does not bruise/bleed easily.   Psychiatric/Behavioral: Negative for agitation, behavioral problems, confusion, dysphoric mood and hallucinations. The patient is not nervous/anxious.          Past Medical History    Past Medical History:   Diagnosis Date   • Anxiety    • Arthritis     osteoarthritis   • ASVD (arteriosclerotic vascular disease)     Abdominal Aorta   • CAD (coronary artery disease)    • Chronic anemia     Anemia of Chronic Disease   • Elevated cholesterol    • ESRD (end stage renal disease) (CMS/HCC)     With HD on TU/TH/SAT   • Fatty liver    • Gastritis     Non Erosive   • GERD (gastroesophageal reflux disease)    • Hiatal hernia    • Hypertension    • Peripheral neuropathy    • Seizure disorder (CMS/HCC)    • Type 2 diabetes mellitus (CMS/HCC)    • Urinary incontinence        Past Surgical History    Past Surgical History:   Procedure Laterality Date   • ARTERIOVENOUS FISTULA REPAIR Left 2017    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   • CARDIAC CATHETERIZATION N/A 11/1/2019    Procedure: Left Heart Cath and coronary angiogram;  Surgeon: Esdras Woodruff MD;  Location: [facility] CATH INVASIVE LOCATION;  Service: Cardiovascular   • CARDIAC CATHETERIZATION N/A 11/1/2019    Procedure: Stent SIRIA coronary;  Surgeon: Esdras Woodruff MD;  Location: [facility] CATH INVASIVE LOCATION;  Service: Cardiovascular   • COLONOSCOPY  2004    2004, 2016   • CORONARY ANGIOPLASTY  2018   • ENDOSCOPY      x2   • GASTRIC BYPASS  2013    Converted to gastric sleeve 2014 due to non-healing ulcer   • LIVER BIOPSY     • ORIF ANKLE FRACTURE Left     Lt ankle Fx w/ORIF   • OTHER SURGICAL HISTORY Right 07/29/2011    Vitreous hemorrhage and retinal surgery right eye   • [place] RT/LT HEART CATHETERS N/A 11/1/2019    Procedure:  Percutaneous Coronary Intervention;  Surgeon: Esdras Woodruff MD;  Location: [facility] CATH INVASIVE LOCATION;  Service: Cardiovascular   • TOTAL ABDOMINAL HYSTERECTOMY      w/bladder susp/LSO         Family History  Family History   Problem Relation Age of Onset   • Diabetes Mother    • Hypertension Mother    • Coronary artery disease Mother    • Dementia Mother    • Heart disease Mother    • Diabetes Father    • Cancer Father         Lung Cancer       Objective:    Vital Signs  Temp:  [97.5 °F (36.4 °C)-97.7 °F (36.5 °C)] 97.7 °F (36.5 °C)  Heart Rate:  [80-90] 80  Resp:  [16-17] 17  BP: (138-147)/(68-69) 138/69      No intake or output data in the 24 hours ending 03/14/20 1015          Physical Exam   General Appearance:    Alert, cooperative, in no acute distress   Head:    Normocephalic, without obvious abnormality, atraumatic   Eyes:            Lids and lashes normal, conjunctivae and sclerae normal, no   icterus, no pallor, corneas clear, PERRLA   Ears:    Ears appear intact with no abnormalities noted   Throat:   No oral lesions, no thrush, oral mucosa moist   Neck:   No adenopathy, supple, trachea midline, no thyromegaly, no   carotid bruit, no JVD   Back:     No kyphosis present, no scoliosis present, no skin lesions,      erythema or scars, no tenderness to percussion or                   palpation,   range of motion normal   Lungs:     Clear to auscultation,respirations regular, even and                  unlabored    Heart:    Regular rhythm and normal rate, normal S1 and S2, no            murmur, no gallop, no rub, no click   Chest Wall:    No abnormalities observed   Abdomen:     Normal bowel sounds, no masses, no organomegaly, soft        non-tender, non-distended, no guarding, no rebound                tenderness   Rectal:     Deferred   Extremities:   Moves all extremities well, no edema, no cyanosis, no             redness   Pulses:   Pulses palpable and equal bilaterally   Skin:   No bleeding,  bruising or rash   Lymph nodes:   No palpable adenopathy   Neurologic:   Cranial nerves 2 - 12 grossly intact, sensation intact, DTR       present and equal bilaterally                Results Review:      Results from last 7 days   Lab Units 03/14/20  0130 03/13/20 2046   SODIUM mmol/L 136 138   CO2 mmol/L 22.0 22.0   BUN mg/dL 44* 43*   CREATININE mg/dL 6.52* 6.49*   CALCIUM mg/dL 7.5* 8.3*       Results from last 7 days   Lab Units 03/14/20  0749 03/14/20 0130 03/13/20 2046   WBC 10*3/mm3  --  7.00 8.30   INR  1.35* 1.34*  --        Pertinent Imaging studies were reviewed      Medication Review:       amLODIPine 5 mg Oral BID   aspirin 81 mg Oral Daily   atorvastatin 40 mg Oral Nightly   cholecalciferol 2,000 Units Oral Daily   cinacalcet 30 mg Oral Daily   DULoxetine 60 mg Oral Daily   hydrALAZINE 25 mg Oral TID   insulin lispro 0-7 Units Subcutaneous 4x Daily With Meals & Nightly   losartan 50 mg Oral Daily   multivitamin b complex with c 1 tablet Oral Daily   nitroglycerin 1 inch Topical Q6H   pregabalin 150 mg Oral Nightly   sodium chloride 10 mL Intravenous Q12H   ticagrelor 90 mg Oral BID   vitamin B-12 1,000 mcg Oral Daily   [START ON 3/15/2020] warfarin 0.5 mg Oral Daily       Pharmacy to dose warfarin        Assesment  End-stage renal disease    Shortness of breath    Hypertension    Diabetes    Coronary artery disease elevated troponin levels    Anemia      Plan:  Pt  was seen on dialysis.  Tolerating it well.  Optimize volume status on dialysis as blood pressure tolerates    Further recommendations based on the hospital course          Elio Valdivia MD  03/14/20  10:15  Tel: [phone]  Fax: [phone]

## 2020-03-14 NOTE — CONSULTS
Referring Provider: Cherie Hopkins DO  Reason for Consultation:  Status post CABG and mitral valve surgery    Patient Care Team:  Naty Kraus DO as PCP - General (Family Medicine)  Naty Kraus DO as PCP - Claims Attributed  Anya Deleon as Technologist    Chief complaint  Shortness of breath    Subjective .     History of present illness:  Michelle Howell is a 65 y.o. female who presents with history of having shortness of breath and she has missed her dialysis due to her mother's death.  Patient had mild elevation of troponin.  Symptoms of shortness of breath started around 2:30 PM yesterday.  She was not having any chest pain cough fever chills nausea sweating.  Patient has ESRD (.  Patient had partial dialysis on Thursday and she had to go to the  home.  Patient had CABG and mitral valve repair 2020 at [facility].  Patient has recovered fairly well from there.  No other associated aggravating or elevating factors.            ROS      Patient is not having any chest discomfort palpitations, dizziness or syncope.  Denies having any headache ,abdominal pain ,nausea, vomiting , diarrhea constipation, loss of weight or loss of appetite.  Denies having any excessive bruising ,hematuria or blood in the stool.    Review of systems negative except as indicated      History  Past Medical History:   Diagnosis Date   • Anxiety    • Arthritis     osteoarthritis   • ASVD (arteriosclerotic vascular disease)     Abdominal Aorta   • CAD (coronary artery disease)    • Chronic anemia     Anemia of Chronic Disease   • Elevated cholesterol    • ESRD (end stage renal disease) (CMS/HCC)     With HD on /SAT   • Fatty liver    • Gastritis     Non Erosive   • GERD (gastroesophageal reflux disease)    • Hiatal hernia    • Hypertension    • Peripheral neuropathy    • Seizure disorder (CMS/[facility])    • Type 2 diabetes mellitus (CMS/[facility])    • Urinary incontinence        Past  Surgical History:   Procedure Laterality Date   • ARTERIOVENOUS FISTULA REPAIR Left 2017    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   • CARDIAC CATHETERIZATION N/A 11/1/2019    Procedure: Left Heart Cath and coronary angiogram;  Surgeon: Esdras Woodruff MD;  Location: [facility] CATH INVASIVE LOCATION;  Service: Cardiovascular   • CARDIAC CATHETERIZATION N/A 11/1/2019    Procedure: Stent SIRIA coronary;  Surgeon: Esdras Woodruff MD;  Location: [facility] CATH INVASIVE LOCATION;  Service: Cardiovascular   • COLONOSCOPY  2004    2004, 2016   • CORONARY ANGIOPLASTY  2018   • ENDOSCOPY      x2   • GASTRIC BYPASS  2013    Converted to gastric sleeve 2014 due to non-healing ulcer   • LIVER BIOPSY     • ORIF ANKLE FRACTURE Left     Lt ankle Fx w/ORIF   • OTHER SURGICAL HISTORY Right 07/29/2011    Vitreous hemorrhage and retinal surgery right eye   • [place] RT/LT HEART CATHETERS N/A 11/1/2019    Procedure: Percutaneous Coronary Intervention;  Surgeon: Esdras Woodruff MD;  Location: [facility] CATH INVASIVE LOCATION;  Service: Cardiovascular   • TOTAL ABDOMINAL HYSTERECTOMY      w/bladder susp/LSO       Family History   Problem Relation Age of Onset   • Diabetes Mother    • Hypertension Mother    • Coronary artery disease Mother    • Dementia Mother    • Heart disease Mother    • Diabetes Father    • Cancer Father         Lung Cancer       Social History     Tobacco Use   • Smoking status: Never Smoker   • Smokeless tobacco: Never Used   Substance Use Topics   • Alcohol use: No     Frequency: Never   • Drug use: No        Medications Prior to Admission   Medication Sig Dispense Refill Last Dose   • amLODIPine (NORVASC) 5 MG tablet Take 5 mg by mouth 2 (Two) Times a Day.   3/13/2020 at Unknown time   • aspirin 81 MG EC tablet Take 81 mg by mouth Daily.   3/13/2020 at Unknown time   •  atorvastatin (LIPITOR) 20 MG tablet Take 1 tablet by mouth Every Night. (Patient taking differently: Take 40 mg by mouth Every Night.) 30 tablet 0 3/12/2020 at Unknown time   • B Complex-C-Folic Acid (DE-NICKY) tablet Take 1 tablet by mouth Daily. 90 each 3 3/13/2020 at Unknown time   • cholecalciferol (VITAMIN D3) 1000 units tablet Take 2 tablets by mouth Daily.   3/13/2020 at Unknown time   • cinacalcet (SENSIPAR) 30 MG tablet Take 1 tablet by mouth Daily.   3/13/2020 at Unknown time   • DULoxetine (CYMBALTA) 60 MG capsule Take 60 mg by mouth Daily.   3/13/2020 at Unknown time   • hydrALAZINE (APRESOLINE) 25 MG tablet Take 25 mg by mouth 3 (Three) Times a Day.   3/13/2020 at Unknown time   • losartan (COZAAR) 50 MG tablet Take 50 mg by mouth Daily.   3/13/2020 at Unknown time   • LYRICA 150 MG capsule Take 1 capsule by mouth every night at bedtime. 90 capsule 1 3/12/2020 at Unknown time   • ticagrelor (BRILINTA) 90 MG tablet tablet Take 90 mg by mouth 2 (Two) Times a Day.   3/13/2020 at Unknown time   • vitamin B-12 (CYANOCOBALAMIN) 1000 MCG tablet Take 1 tablet by mouth Daily.   3/13/2020 at Unknown time   • warfarin (COUMADIN) 1 MG tablet Take 0.5 mg by mouth Daily.   3/13/2020 at Unknown time   • HYDROcodone-acetaminophen (NORCO)  MG per tablet Take 1 tablet by mouth Every 6 (Six) Hours As Needed for Moderate Pain .   More than a month at Unknown time         Patient has no known allergies.    Scheduled Meds:  amLODIPine 5 mg Oral BID   aspirin 81 mg Oral Daily   atorvastatin 40 mg Oral Nightly   cholecalciferol 2,000 Units Oral Daily   cinacalcet 30 mg Oral Daily   DULoxetine 60 mg Oral Daily   hydrALAZINE 25 mg Oral TID   insulin lispro 0-7 Units Subcutaneous 4x Daily With Meals & Nightly   losartan 50 mg Oral Daily   multivitamin b complex with c 1 tablet Oral Daily   nitroglycerin 1 inch Topical Q6H   pregabalin 150 mg Oral Nightly   sodium chloride 10 mL Intravenous Q12H   ticagrelor 90 mg Oral BID   "  vitamin B-12 1,000 mcg Oral Daily   warfarin 0.5 mg Oral Daily     Continuous Infusions:  Pharmacy to dose warfarin      PRN Meds:.•  acetaminophen **OR** acetaminophen **OR** acetaminophen  •  dextrose  •  dextrose  •  glucagon (human recombinant)  •  HYDROcodone-acetaminophen  •  insulin lispro **AND** insulin lispro  •  ondansetron **OR** ondansetron  •  Pharmacy to dose warfarin  •  sodium chloride    Objective     VITAL SIGNS  Vitals:    03/13/20 2007 03/14/20 0411   BP: 147/68 138/69   BP Location: Right arm Right arm   Patient Position: Lying Lying   Pulse: 90 80   Resp: 16 17   Temp: 97.5 °F (36.4 °C) 97.7 °F (36.5 °C)   TempSrc: Oral Oral   SpO2: 95% 98%   Weight: 64.2 kg (141 lb 8.6 oz)    Height: 162.6 cm (64\")        Flowsheet Rows      First Filed Value   Admission Height  162.6 cm (64\") Documented at 03/13/2020 2007   Admission Weight  64.2 kg (141 lb 8.6 oz) Documented at 03/13/2020 2007          No intake or output data in the 24 hours ending 03/14/20 0657     TELEMETRY: Sinus rhythm    Physical Exam:  The patient is alert, oriented and in no distress.  Vital signs as noted above.  Head and neck revealed no carotid bruits or jugular venous distention.  No thyromegaly or lymph adenopathy is present  Lungs clear.  No wheezing.  Breath sounds are normal bilaterally.  Heart normal first and second heart sounds.No murmur.  No precordial rub is present.  No gallop is present.  Abdomen soft and nontender.  No organomegaly is present.  Extremities with good peripheral pulses without any pedal edema.  Skin warm and dry.  Musculoskeletal system is grossly normal  CNS grossly normal      Results Review:   I reviewed the patient's new clinical results.  Lab Results (last 24 hours)     Procedure Component Value Units Date/Time    Troponin [[id number]]  (Abnormal) Collected:  03/14/20 0130    Specimen:  Blood Updated:  03/14/20 0226     Troponin T 0.259 ng/mL     Narrative:       Troponin T Reference Range:  <= " 0.03 ng/mL-   Negative for AMI  >0.03 ng/mL-     Abnormal for myocardial necrosis.  Clinicians would have to utilize clinical acumen, EKG, Troponin and serial changes to determine if it is an Acute Myocardial Infarction or myocardial injury due to an underlying chronic condition.       Results may be falsely decreased if patient taking Biotin.      TSH [[id number]]  (Normal) Collected:  03/14/20 0130    Specimen:  Blood Updated:  03/14/20 0222     TSH 3.360 uIU/mL     Basic Metabolic Panel [[id number]]  (Abnormal) Collected:  03/14/20 0130    Specimen:  Blood Updated:  03/14/20 0216     Glucose 159 mg/dL      BUN 44 mg/dL      Creatinine 6.52 mg/dL      Sodium 136 mmol/L      Potassium 3.9 mmol/L      Chloride 97 mmol/L      CO2 22.0 mmol/L      Calcium 7.5 mg/dL      eGFR   Amer --     Comment: <15 Indicative of kidney failure.        eGFR Non African Amer 6 mL/min/1.73      Comment: <15 Indicative of kidney failure.        BUN/Creatinine Ratio 6.7     Anion Gap 17.0 mmol/L     Narrative:       GFR Normal >60  Chronic Kidney Disease <60  Kidney Failure <15      Magnesium [[id number]]  (Normal) Collected:  03/14/20 0130    Specimen:  Blood Updated:  03/14/20 0216     Magnesium 1.9 mg/dL     Phosphorus [[id number]]  (Abnormal) Collected:  03/14/20 0130    Specimen:  Blood Updated:  03/14/20 0216     Phosphorus 4.6 mg/dL     Protime-INR [[id number]]  (Abnormal) Collected:  03/14/20 0130    Specimen:  Blood Updated:  03/14/20 0211     Protime 13.4 Seconds      INR 1.34    CBC Auto Differential [[id number]]  (Abnormal) Collected:  03/14/20 0130    Specimen:  Blood Updated:  03/14/20 0153     WBC 7.00 10*3/mm3      RBC 3.18 10*6/mm3      Hemoglobin 9.4 g/dL      Hematocrit 29.7 %      MCV 93.5 fL      MCH 29.4 pg      MCHC 31.5 g/dL      RDW 18.3 %      RDW-SD 60.4 fl      MPV 7.5 fL      Platelets 362 10*3/mm3      Neutrophil % 84.9 %      Lymphocyte % 6.4 %      Monocyte % 8.3 %      Eosinophil % 0.3 %      Basophil %  0.1 %      Neutrophils, Absolute 5.90 10*3/mm3      Lymphocytes, Absolute 0.40 10*3/mm3      Monocytes, Absolute 0.60 10*3/mm3      Eosinophils, Absolute 0.00 10*3/mm3      Basophils, Absolute 0.00 10*3/mm3      nRBC 0.0 /100 WBC     POC Glucose Once [[id number]]  (Abnormal) Collected:  03/13/20 2154    Specimen:  Blood Updated:  03/13/20 2155     Glucose 179 mg/dL      Comment: Serial Number: [id number]:  [id number]       Troponin [[id number]]  (Abnormal) Collected:  03/13/20 2046    Specimen:  Blood Updated:  03/13/20 2148     Troponin T 0.297 ng/mL     Narrative:       Troponin T Reference Range:  <= 0.03 ng/mL-   Negative for AMI  >0.03 ng/mL-     Abnormal for myocardial necrosis.  Clinicians would have to utilize clinical acumen, EKG, Troponin and serial changes to determine if it is an Acute Myocardial Infarction or myocardial injury due to an underlying chronic condition.       Results may be falsely decreased if patient taking Biotin.      Basic Metabolic Panel [[id number]]  (Abnormal) Collected:  03/13/20 2046    Specimen:  Blood Updated:  03/13/20 2146     Glucose 188 mg/dL      BUN 43 mg/dL      Creatinine 6.49 mg/dL      Sodium 138 mmol/L      Potassium 4.1 mmol/L      Chloride 99 mmol/L      CO2 22.0 mmol/L      Calcium 8.3 mg/dL      eGFR   Amer --     Comment: <15 Indicative of kidney failure.        eGFR Non African Amer 6 mL/min/1.73      Comment: <15 Indicative of kidney failure.        BUN/Creatinine Ratio 6.6     Anion Gap 17.0 mmol/L     Narrative:       GFR Normal >60  Chronic Kidney Disease <60  Kidney Failure <15      CBC (No Diff) [[id number]]  (Abnormal) Collected:  03/13/20 2046    Specimen:  Blood Updated:  03/13/20 2118     WBC 8.30 10*3/mm3      RBC 3.22 10*6/mm3      Hemoglobin 9.9 g/dL      Hematocrit 29.9 %      MCV 93.1 fL      MCH 30.8 pg      MCHC 33.1 g/dL      RDW 18.4 %      RDW-SD 61.3 fl      MPV 7.8 fL      Platelets 349 10*3/mm3     Hemoglobin A1c [[id number]]  Collected:  03/13/20 2046    Specimen:  Blood Updated:  03/13/20 2115          Imaging Results (Last 24 Hours)     ** No results found for the last 24 hours. **      LAB RESULTS (LAST 7 DAYS)    CBC  Results from last 7 days   Lab Units 03/14/20 0130 03/13/20 2046   WBC 10*3/mm3 7.00 8.30   RBC 10*6/mm3 3.18* 3.22*   HEMOGLOBIN g/dL 9.4* 9.9*   HEMATOCRIT % 29.7* 29.9*   MCV fL 93.5 93.1   PLATELETS 10*3/mm3 362 349       BMP  Results from last 7 days   Lab Units 03/14/20 0130 03/13/20 2046   SODIUM mmol/L 136 138   POTASSIUM mmol/L 3.9 4.1   CHLORIDE mmol/L 97* 99   CO2 mmol/L 22.0 22.0   BUN mg/dL 44* 43*   CREATININE mg/dL 6.52* 6.49*   GLUCOSE mg/dL 159* 188*   MAGNESIUM mg/dL 1.9  --    PHOSPHORUS mg/dL 4.6*  --        CMP Results from last 7 days   Lab Units 03/14/20 0130 03/13/20 2046   SODIUM mmol/L 136 138   POTASSIUM mmol/L 3.9 4.1   CHLORIDE mmol/L 97* 99   CO2 mmol/L 22.0 22.0   BUN mg/dL 44* 43*   CREATININE mg/dL 6.52* 6.49*   GLUCOSE mg/dL 159* 188*         BNP        TROPONIN  Results from last 7 days   Lab Units 03/14/20 0130   TROPONIN T ng/mL 0.259*       CoAg  Results from last 7 days   Lab Units 03/14/20 0130   INR  1.34*       Creatinine Clearance  Estimated Creatinine Clearance: 8.7 mL/min (A) (by C-G formula based on SCr of 6.52 mg/dL (H)).    ABG        Radiology  No radiology results for the last day            EKG      I personally viewed and interpreted the patient's EKG/Telemetry data:    ECHOCARDIOGRAM:    Results for orders placed during the hospital encounter of 10/26/19   Adult Transthoracic Echo Complete W/ Cont if Necessary Per Protocol    Narrative · Left atrial cavity size is severely dilated.  · Severe mitral valve regurgitation is present       Normal LV size and contractility EF of 60 to 65%  Normal RV size  Severe left atrial enlargement  Normal right atrial size  Severe mitral annular calcification with calcified posterior mitral   leaflet and eccentric MR which  appears to be severe seen  Aortic valve,  tricuspid valve appears structurally normal, no significant   regurgitation seen.  No pericardial effusion seen.  Proximal aorta appears normal in size.                 Cardiolite (Tc-99m Sestamibi) stress test      OTHER:     Assessment/Plan     Active Problems:    Elevated troponin    ]]]]]]]]]]]]]]]]]]]]  Impression  ==========  -Shortness of breath most likely due to missing dialysis recently.  Patient did not have completed dialysis on Thursday due to mother's death and going to  home.    - Mild elevation of troponin probably stress related due to fluid overload.    -Status post CABG x1 and mitral valve repair performed at [facility] (details are not available.  Will obtain records from [facility])    -Status post stent to mid LAD 2019  -ESRD-     -Hypertension dyslipidemia.  Resolved diabetes since patient had weight loss from surgery.     -Anticoagulation patient is on Coumadin.      -History of gastric bypass .  Surgical history Donald-en-Y done in  converted to sleeve gastric bypass; hysterectomy; left ankle; AV fistula left arm; CABG with valve replacement, stent    -History of stroke.  Status post TPA 2018    -Non-smoker      Plan  Patient to undergo dialysis.  We will evaluate by echocardiogram left ventricle function and mitral valve integrity  Mild elevation of troponin..  Follow-up labs ordered.  Further plan will depend on patient's progress.  Of note patient wants to go home tomorrow for mothers  on Monday         Gabriele Lennon MD  20  06:57

## 2020-03-14 NOTE — PLAN OF CARE
Problem: Patient Care Overview  Goal: Plan of Care Review  Outcome: Ongoing (interventions implemented as appropriate)  Flowsheets (Taken 3/14/2020 6978)  Progress: no change  Plan of Care Reviewed With: patient  Outcome Summary: Patient in bed but has been awake most of the night.  Cooperative. patinet eating ice chips.

## 2020-03-15 ENCOUNTER — APPOINTMENT (OUTPATIENT)
Dept: CARDIOLOGY | Facility: HOSPITAL | Age: 65
End: 2020-03-15

## 2020-03-15 VITALS
OXYGEN SATURATION: 97 % | RESPIRATION RATE: 16 BRPM | WEIGHT: 139 LBS | DIASTOLIC BLOOD PRESSURE: 68 MMHG | SYSTOLIC BLOOD PRESSURE: 150 MMHG | HEART RATE: 92 BPM | TEMPERATURE: 97.8 F | HEIGHT: 64 IN | BODY MASS INDEX: 23.73 KG/M2

## 2020-03-15 LAB
ANION GAP SERPL CALCULATED.3IONS-SCNC: 12 MMOL/L (ref 5–15)
BH CV ECHO MEAS - ACS: 1.5 CM
BH CV ECHO MEAS - AO MAX PG (FULL): 8 MMHG
BH CV ECHO MEAS - AO MAX PG: 12.8 MMHG
BH CV ECHO MEAS - AO MEAN PG (FULL): 4.2 MMHG
BH CV ECHO MEAS - AO MEAN PG: 6.7 MMHG
BH CV ECHO MEAS - AO ROOT AREA (BSA CORRECTED): 2
BH CV ECHO MEAS - AO ROOT AREA: 8.9 CM^2
BH CV ECHO MEAS - AO ROOT DIAM: 3.4 CM
BH CV ECHO MEAS - AO V2 MAX: 179.1 CM/SEC
BH CV ECHO MEAS - AO V2 MEAN: 119.4 CM/SEC
BH CV ECHO MEAS - AO V2 VTI: 34.5 CM
BH CV ECHO MEAS - AORTIC HR: 91.1 BPM
BH CV ECHO MEAS - AORTIC R-R: 0.66 SEC
BH CV ECHO MEAS - ASC AORTA: 3 CM
BH CV ECHO MEAS - AVA(I,A): 1.9 CM^2
BH CV ECHO MEAS - AVA(I,D): 1.9 CM^2
BH CV ECHO MEAS - AVA(V,A): 1.9 CM^2
BH CV ECHO MEAS - AVA(V,D): 1.9 CM^2
BH CV ECHO MEAS - BSA(HAYCOCK): 1.7 M^2
BH CV ECHO MEAS - BSA: 1.7 M^2
BH CV ECHO MEAS - BZI_BMI: 23.9 KILOGRAMS/M^2
BH CV ECHO MEAS - BZI_METRIC_HEIGHT: 162.6 CM
BH CV ECHO MEAS - BZI_METRIC_WEIGHT: 63 KG
BH CV ECHO MEAS - CI(AO): 16.7 L/MIN/M^2
BH CV ECHO MEAS - CI(LVOT): 3.6 L/MIN/M^2
BH CV ECHO MEAS - CO(AO): 27.9 L/MIN
BH CV ECHO MEAS - CO(LVOT): 6.1 L/MIN
BH CV ECHO MEAS - EDV(CUBED): 88.9 ML
BH CV ECHO MEAS - EDV(MOD-SP4): 96.6 ML
BH CV ECHO MEAS - EDV(TEICH): 90.7 ML
BH CV ECHO MEAS - EF(CUBED): 64.4 %
BH CV ECHO MEAS - EF(MOD-BP): 64 %
BH CV ECHO MEAS - EF(MOD-SP4): 63.6 %
BH CV ECHO MEAS - EF(TEICH): 56.1 %
BH CV ECHO MEAS - ESV(CUBED): 31.7 ML
BH CV ECHO MEAS - ESV(MOD-SP4): 35.2 ML
BH CV ECHO MEAS - ESV(TEICH): 39.9 ML
BH CV ECHO MEAS - FS: 29.1 %
BH CV ECHO MEAS - IVS/LVPW: 0.78
BH CV ECHO MEAS - IVSD: 0.94 CM
BH CV ECHO MEAS - LA DIMENSION(2D): 4 CM
BH CV ECHO MEAS - LV DIASTOLIC VOL/BSA (35-75): 57.7 ML/M^2
BH CV ECHO MEAS - LV MASS(C)D: 166 GRAMS
BH CV ECHO MEAS - LV MASS(C)DI: 99.1 GRAMS/M^2
BH CV ECHO MEAS - LV MAX PG: 4.9 MMHG
BH CV ECHO MEAS - LV MEAN PG: 2.6 MMHG
BH CV ECHO MEAS - LV SYSTOLIC VOL/BSA (12-30): 21 ML/M^2
BH CV ECHO MEAS - LV V1 MAX: 110.2 CM/SEC
BH CV ECHO MEAS - LV V1 MEAN: 74.5 CM/SEC
BH CV ECHO MEAS - LV V1 VTI: 22.1 CM
BH CV ECHO MEAS - LVIDD: 4.5 CM
BH CV ECHO MEAS - LVIDS: 3.2 CM
BH CV ECHO MEAS - LVOT AREA: 3 CM^2
BH CV ECHO MEAS - LVOT DIAM: 2 CM
BH CV ECHO MEAS - LVPWD: 1.2 CM
BH CV ECHO MEAS - MV E MAX VEL: 191.7 CM/SEC
BH CV ECHO MEAS - MV MAX PG: 16.8 MMHG
BH CV ECHO MEAS - MV MEAN PG: 8.1 MMHG
BH CV ECHO MEAS - MV V2 MAX: 205.2 CM/SEC
BH CV ECHO MEAS - MV V2 MEAN: 132.7 CM/SEC
BH CV ECHO MEAS - MV V2 VTI: 39.9 CM
BH CV ECHO MEAS - MVA(VTI): 1.7 CM^2
BH CV ECHO MEAS - PA ACC TIME: 0.09 SEC
BH CV ECHO MEAS - PA MAX PG (FULL): 0.47 MMHG
BH CV ECHO MEAS - PA MAX PG: 2.5 MMHG
BH CV ECHO MEAS - PA MEAN PG (FULL): 0.15 MMHG
BH CV ECHO MEAS - PA MEAN PG: 1.2 MMHG
BH CV ECHO MEAS - PA PR(ACCEL): 37.9 MMHG
BH CV ECHO MEAS - PA V2 MAX: 78.4 CM/SEC
BH CV ECHO MEAS - PA V2 MEAN: 49.4 CM/SEC
BH CV ECHO MEAS - PA V2 VTI: 14.1 CM
BH CV ECHO MEAS - PI END-D VEL: 54.2 CM/SEC
BH CV ECHO MEAS - PI MAX PG: 7.2 MMHG
BH CV ECHO MEAS - PI MAX VEL: 95 CM/SEC
BH CV ECHO MEAS - PVA(I,A): 1.4 CM^2
BH CV ECHO MEAS - PVA(I,D): 1.4 CM^2
BH CV ECHO MEAS - PVA(V,A): 1.3 CM^2
BH CV ECHO MEAS - PVA(V,D): 1.3 CM^2
BH CV ECHO MEAS - QP/QS: 0.3
BH CV ECHO MEAS - RAP SYSTOLE: 3 MMHG
BH CV ECHO MEAS - RV MAX PG: 2 MMHG
BH CV ECHO MEAS - RV MEAN PG: 1 MMHG
BH CV ECHO MEAS - RV V1 MAX: 70.5 CM/SEC
BH CV ECHO MEAS - RV V1 MEAN: 46.8 CM/SEC
BH CV ECHO MEAS - RV V1 VTI: 13.9 CM
BH CV ECHO MEAS - RVDD: 2.8 CM
BH CV ECHO MEAS - RVOT AREA: 1.4 CM^2
BH CV ECHO MEAS - RVOT DIAM: 1.3 CM
BH CV ECHO MEAS - RVSP: 43.5 MMHG
BH CV ECHO MEAS - SI(AO): 183 ML/M^2
BH CV ECHO MEAS - SI(CUBED): 34.2 ML/M^2
BH CV ECHO MEAS - SI(LVOT): 39.8 ML/M^2
BH CV ECHO MEAS - SI(MOD-SP4): 36.7 ML/M^2
BH CV ECHO MEAS - SI(TEICH): 30.3 ML/M^2
BH CV ECHO MEAS - SV(AO): 306.6 ML
BH CV ECHO MEAS - SV(CUBED): 57.3 ML
BH CV ECHO MEAS - SV(LVOT): 66.7 ML
BH CV ECHO MEAS - SV(MOD-SP4): 61.4 ML
BH CV ECHO MEAS - SV(RVOT): 19.7 ML
BH CV ECHO MEAS - SV(TEICH): 50.8 ML
BH CV ECHO MEAS - TR MAX VEL: 318.2 CM/SEC
BUN BLD-MCNC: 17 MG/DL (ref 8–23)
BUN/CREAT SERPL: 4.8 (ref 7–25)
CALCIUM SPEC-SCNC: 7.4 MG/DL (ref 8.6–10.5)
CHLORIDE SERPL-SCNC: 102 MMOL/L (ref 98–107)
CO2 SERPL-SCNC: 24 MMOL/L (ref 22–29)
CREAT BLD-MCNC: 3.53 MG/DL (ref 0.57–1)
GFR SERPL CREATININE-BSD FRML MDRD: 13 ML/MIN/1.73
GFR SERPL CREATININE-BSD FRML MDRD: ABNORMAL ML/MIN/{1.73_M2}
GLUCOSE BLD-MCNC: 124 MG/DL (ref 65–99)
GLUCOSE BLDC GLUCOMTR-MCNC: 94 MG/DL (ref 70–105)
INR PPP: 1.29 (ref 2–3)
LV EF 2D ECHO EST: 60 %
POTASSIUM BLD-SCNC: 4.1 MMOL/L (ref 3.5–5.2)
PROTHROMBIN TIME: 12.9 SECONDS (ref 19.4–28.5)
SODIUM BLD-SCNC: 138 MMOL/L (ref 136–145)

## 2020-03-15 PROCEDURE — 93306 TTE W/DOPPLER COMPLETE: CPT

## 2020-03-15 PROCEDURE — 93306 TTE W/DOPPLER COMPLETE: CPT | Performed by: INTERNAL MEDICINE

## 2020-03-15 PROCEDURE — 85610 PROTHROMBIN TIME: CPT | Performed by: NURSE PRACTITIONER

## 2020-03-15 PROCEDURE — G0378 HOSPITAL OBSERVATION PER HR: HCPCS

## 2020-03-15 PROCEDURE — 82962 GLUCOSE BLOOD TEST: CPT

## 2020-03-15 PROCEDURE — 80048 BASIC METABOLIC PNL TOTAL CA: CPT | Performed by: INTERNAL MEDICINE

## 2020-03-15 PROCEDURE — 99217 PR OBSERVATION CARE DISCHARGE MANAGEMENT: CPT | Performed by: INTERNAL MEDICINE

## 2020-03-15 PROCEDURE — 99214 OFFICE O/P EST MOD 30 MIN: CPT | Performed by: INTERNAL MEDICINE

## 2020-03-15 RX ORDER — WARFARIN SODIUM 1 MG/1
0.5 TABLET ORAL
Status: DISCONTINUED | OUTPATIENT
Start: 2020-03-15 | End: 2020-03-15 | Stop reason: HOSPADM

## 2020-03-15 RX ADMIN — MELATONIN 2000 UNITS: at 08:46

## 2020-03-15 RX ADMIN — CYANOCOBALAMIN TAB 1000 MCG 1000 MCG: 1000 TAB at 08:46

## 2020-03-15 RX ADMIN — ASPIRIN 81 MG: 81 TABLET, DELAYED RELEASE ORAL at 08:46

## 2020-03-15 RX ADMIN — NITROGLYCERIN 1 INCH: 20 OINTMENT TOPICAL at 05:47

## 2020-03-15 RX ADMIN — AMLODIPINE BESYLATE 5 MG: 5 TABLET ORAL at 08:46

## 2020-03-15 RX ADMIN — TICAGRELOR 90 MG: 90 TABLET ORAL at 08:49

## 2020-03-15 RX ADMIN — DULOXETINE HYDROCHLORIDE 60 MG: 30 CAPSULE, DELAYED RELEASE ORAL at 08:46

## 2020-03-15 RX ADMIN — HYDRALAZINE HYDROCHLORIDE 25 MG: 25 TABLET, FILM COATED ORAL at 08:46

## 2020-03-15 RX ADMIN — Medication 1 TABLET: at 08:46

## 2020-03-15 RX ADMIN — WARFARIN SODIUM 0.5 MG: 1 TABLET ORAL at 13:02

## 2020-03-15 RX ADMIN — Medication 10 ML: at 08:49

## 2020-03-15 RX ADMIN — LOSARTAN POTASSIUM 50 MG: 50 TABLET, FILM COATED ORAL at 08:46

## 2020-03-15 RX ADMIN — ACETAMINOPHEN 650 MG: 325 TABLET, FILM COATED ORAL at 03:46

## 2020-03-15 NOTE — PROGRESS NOTES
"Pharmacy Dosing Service  Anticoagulant  Warfarin     Assessment/Action/Plan:  Patient admitted with subtherapeutic INR. Upon admission, patient received 1x dose of 2.5 mg. Will resume patients home dose of 0.5 mg today. INR not reflective of patient receiving 2.5 mg dose yesterday. Target INR of 1.8 -2. Will continue to monitor, daily INR ordered.     Subjective:  Michelle Howell is a 65 y.o.female with a Pharmacy to Dose consult for warfarin for mechanical valve.    Date 3/14 3/15          INR 1.35 1.29          Dose 2.5 mg 0.5 mg            INR Goal: 1.8-2  Home Dose: 0.5 mg daily  Bridge Therapy Present?: No  Interacting Medications Evaluation (New/Present/Dicontinued): None  Additional Contributing Factors:     Objective:  [Ht: 162.6 cm (64\"); Wt: 63 kg (139 lb); BMI: Body mass index is 23.86 kg/m².]  Lab Results   Component Value Date    INR 1.29 (L) 03/15/2020    INR 1.35 (L) 03/14/2020    INR 1.34 (L) 03/14/2020    INR 1.01 11/01/2019    INR 1.06 10/28/2019     Lab Results   Component Value Date    HGB 9.4 (L) 03/14/2020    HGB 9.9 (L) 03/13/2020    HGB 9.5 (L) 11/05/2019       Jasmin Du, PharmD  03/15/20 11:54       "

## 2020-03-15 NOTE — SIGNIFICANT NOTE
03/15/20 1246   Discharge of Care   Discharge Mode wheel chair   Discharge Destination home   Nurse Report Given To N/A   Discharged Accompanied by family member/friend   Discharge Contact Information if Applicable [phone]   Discharge Teaching Done  Yes   Learning Method Explanation

## 2020-03-15 NOTE — DISCHARGE SUMMARY
Date of Admission: 3/13/2020    Date of Discharge:  3/15/2020    Length of stay:  LOS: 0 days     Admission Diagnosis:   Elevated troponin [R79.89]      Discharge Diagnosis:     Indeterminately elevated troponin, uncertain etiology  - echo-Mitral prosthetic valve function is normal, Moderate tricuspid regurgitation is present., Mild biatrial enlargement is present, Left ventricular size and contractility is normal with ejection fraction of 60%  - no chest pain  - Cardiology ok for patient to be discharged      ESRD with dialysis T, Th, Sat  - Nephrology consulted  - Continue Sensipar  - resume regular HD schedule      Hypertension, chronic  - Continue amlodipine, hydralazine, losartan     CAD s/p CABG and mitral valve repair  - Continue aspirin, Brilinta  - continue warfarin with target INR 1.8-2    Subtherapeutic INR  - increase warfarin to 1 tablet today  - repeat PT/INR on Friday as scheduled     HLD, chronic  - Continue Lipitor     Dietary supplementation  - resume home supplements       Gastric bypass 2013 with weight loss     Depression, chronic  - Continue Cymbalta, Lyrica     Hyperglycemia, moderate glucose 187 with history of diabetes type 2 with improvement status post weight loss after gastric bypass  - Hgb A1c 4.9 but patient is on HD   - follow up with PCP         Active Hospital Problems    Diagnosis  POA   • Elevated troponin [R79.89]  Yes   • End stage renal disease (CMS/HCC) [N18.6]  Yes   • CAD (coronary artery disease) [I25.10]  Yes      Resolved Hospital Problems   No resolved problems to display.       Hospital Course:  Patient is a 65 y.o. female presented as a transfer from [facility] where she presented with a chief complaint of shortness of breath which started around 2:30 PM today.  The patient denies chest pain, cough, nausea or diaphoresis.  The patient has end-stage renal disease with dialysis times approximately 2 years with treatment Tuesday Thursday Saturday.  The  patient had partial treatment on Thursday because her mother  and she wanted to go to the  home.  The patient also had open heart surgery with artery bypass and mitral valve repair on 2020.  The patient reports she has been recovering well since her surgery.  Patients troponin remained slightly elevated. She was evaluated by cardiology and had echocardiogram performed. She also underwent HD. Her shortness of breath as improved and she is anxious to discharge home as her Mother's  is tomorrow. I have discussed adjusting her warfarin dose and calling her prescribing physician as her PT/INR remains subtherapeutic. She states understanding and she will need to follow up with her PCP in one week.     Procedures Performed:         Consults:   Consults     Date and Time Order Name Status Description    3/13/2020 2201 Inpatient Cardiology Consult Completed     3/13/2020 2052 Inpatient Nephrology Consult            Vital Signs:  Temp:  [97.5 °F (36.4 °C)-99 °F (37.2 °C)] 97.8 °F (36.6 °C)  Heart Rate:  [82-92] 92  Resp:  [16-17] 16  BP: (120-160)/(64-77) 150/68        Physical Exam:  Physical Exam   Constitutional: She appears well-developed and well-nourished. No distress.   HENT:   Head: Normocephalic and atraumatic.   Cardiovascular: Normal rate and regular rhythm.   Pulmonary/Chest: Effort normal and breath sounds normal.   Abdominal: Soft. Bowel sounds are normal. She exhibits no distension. There is no tenderness.   Skin: Skin is warm and dry. No rash noted.   Psychiatric: She has a normal mood and affect. Her behavior is normal.   Vitals reviewed.          Pertinent Test Results:   Lab Results (last 72 hours)     Procedure Component Value Units Date/Time    POC Glucose Once [[id number]]  (Normal) Collected:  03/15/20 0713    Specimen:  Blood Updated:  03/15/20 0721     Glucose 94 mg/dL      Comment: Serial Number: [id number]:  [id number]       Basic Metabolic Panel [[id number]]  (Abnormal)  Collected:  03/15/20 0303    Specimen:  Blood Updated:  03/15/20 0426     Glucose 124 mg/dL      BUN 17 mg/dL      Creatinine 3.53 mg/dL      Sodium 138 mmol/L      Potassium 4.1 mmol/L      Chloride 102 mmol/L      CO2 24.0 mmol/L      Calcium 7.4 mg/dL      eGFR   Amer --     Comment: <15 Indicative of kidney failure.        eGFR Non African Amer 13 mL/min/1.73      Comment: <15 Indicative of kidney failure.        BUN/Creatinine Ratio 4.8     Anion Gap 12.0 mmol/L     Narrative:       GFR Normal >60  Chronic Kidney Disease <60  Kidney Failure <15      Protime-INR [[id number]]  (Abnormal) Collected:  03/15/20 0303    Specimen:  Blood Updated:  03/15/20 0355     Protime 12.9 Seconds      INR 1.29    POC Glucose Once [[id number]]  (Abnormal) Collected:  03/14/20 1149    Specimen:  Blood Updated:  03/14/20 1150     Glucose 132 mg/dL      Comment: Serial Number: [id number]:  [id number]       Hemoglobin A1c [[id number]]  (Normal) Collected:  03/13/20 2046    Specimen:  Blood Updated:  03/14/20 1136     Hemoglobin A1C 4.9 %     Narrative:       Hemoglobin A1C Reference Range:    <5.7 %        Normal  5.7-6.4 %     Increased risk for diabetes  > 6.4 %        Diabetes       These guidelines have been recommended by the American Diabetic Association for Hgb A1c.      The following 2010 guidelines have been recommended by the American Diabetes Association for Hemoglobin A1c.    HBA1c 5.7-6.4% Increased risk for future diabetes (pre-diabetes)  HBA1c     >6.4% Diabetes      Troponin [[id number]]  (Abnormal) Collected:  03/14/20 0749    Specimen:  Blood Updated:  03/14/20 0850     Troponin T 0.275 ng/mL     Narrative:       Troponin T Reference Range:  <= 0.03 ng/mL-   Negative for AMI  >0.03 ng/mL-     Abnormal for myocardial necrosis.  Clinicians would have to utilize clinical acumen, EKG, Troponin and serial changes to determine if it is an Acute Myocardial Infarction or myocardial injury due to an underlying  chronic condition.       Results may be falsely decreased if patient taking Biotin.      Protime-INR [[id number]]  (Abnormal) Collected:  03/14/20 0749    Specimen:  Blood Updated:  03/14/20 0831     Protime 13.5 Seconds      INR 1.35    POC Glucose Once [[id number]]  (Normal) Collected:  03/14/20 0706    Specimen:  Blood Updated:  03/14/20 0708     Glucose 103 mg/dL      Comment: Serial Number: [id number]:  [id number]       Troponin [[id number]]  (Abnormal) Collected:  03/14/20 0130    Specimen:  Blood Updated:  03/14/20 0226     Troponin T 0.259 ng/mL     Narrative:       Troponin T Reference Range:  <= 0.03 ng/mL-   Negative for AMI  >0.03 ng/mL-     Abnormal for myocardial necrosis.  Clinicians would have to utilize clinical acumen, EKG, Troponin and serial changes to determine if it is an Acute Myocardial Infarction or myocardial injury due to an underlying chronic condition.       Results may be falsely decreased if patient taking Biotin.      TSH [[id number]]  (Normal) Collected:  03/14/20 0130    Specimen:  Blood Updated:  03/14/20 0222     TSH 3.360 uIU/mL     Basic Metabolic Panel [[id number]]  (Abnormal) Collected:  03/14/20 0130    Specimen:  Blood Updated:  03/14/20 0216     Glucose 159 mg/dL      BUN 44 mg/dL      Creatinine 6.52 mg/dL      Sodium 136 mmol/L      Potassium 3.9 mmol/L      Chloride 97 mmol/L      CO2 22.0 mmol/L      Calcium 7.5 mg/dL      eGFR   Amer --     Comment: <15 Indicative of kidney failure.        eGFR Non African Amer 6 mL/min/1.73      Comment: <15 Indicative of kidney failure.        BUN/Creatinine Ratio 6.7     Anion Gap 17.0 mmol/L     Narrative:       GFR Normal >60  Chronic Kidney Disease <60  Kidney Failure <15      Magnesium [[id number]]  (Normal) Collected:  03/14/20 0130    Specimen:  Blood Updated:  03/14/20 0216     Magnesium 1.9 mg/dL     Phosphorus [[id number]]  (Abnormal) Collected:  03/14/20 0130    Specimen:  Blood Updated:  03/14/20 0216     Phosphorus  4.6 mg/dL     Protime-INR [[id number]]  (Abnormal) Collected:  03/14/20 0130    Specimen:  Blood Updated:  03/14/20 0211     Protime 13.4 Seconds      INR 1.34    CBC Auto Differential [[id number]]  (Abnormal) Collected:  03/14/20 0130    Specimen:  Blood Updated:  03/14/20 0153     WBC 7.00 10*3/mm3      RBC 3.18 10*6/mm3      Hemoglobin 9.4 g/dL      Hematocrit 29.7 %      MCV 93.5 fL      MCH 29.4 pg      MCHC 31.5 g/dL      RDW 18.3 %      RDW-SD 60.4 fl      MPV 7.5 fL      Platelets 362 10*3/mm3      Neutrophil % 84.9 %      Lymphocyte % 6.4 %      Monocyte % 8.3 %      Eosinophil % 0.3 %      Basophil % 0.1 %      Neutrophils, Absolute 5.90 10*3/mm3      Lymphocytes, Absolute 0.40 10*3/mm3      Monocytes, Absolute 0.60 10*3/mm3      Eosinophils, Absolute 0.00 10*3/mm3      Basophils, Absolute 0.00 10*3/mm3      nRBC 0.0 /100 WBC     POC Glucose Once [[id number]]  (Abnormal) Collected:  03/13/20 2154    Specimen:  Blood Updated:  03/13/20 2155     Glucose 179 mg/dL      Comment: Serial Number: [id number]:  [id number]       Troponin [[id number]]  (Abnormal) Collected:  03/13/20 2046    Specimen:  Blood Updated:  03/13/20 2148     Troponin T 0.297 ng/mL     Narrative:       Troponin T Reference Range:  <= 0.03 ng/mL-   Negative for AMI  >0.03 ng/mL-     Abnormal for myocardial necrosis.  Clinicians would have to utilize clinical acumen, EKG, Troponin and serial changes to determine if it is an Acute Myocardial Infarction or myocardial injury due to an underlying chronic condition.       Results may be falsely decreased if patient taking Biotin.      Basic Metabolic Panel [[id number]]  (Abnormal) Collected:  03/13/20 2046    Specimen:  Blood Updated:  03/13/20 2146     Glucose 188 mg/dL      BUN 43 mg/dL      Creatinine 6.49 mg/dL      Sodium 138 mmol/L      Potassium 4.1 mmol/L      Chloride 99 mmol/L      CO2 22.0 mmol/L      Calcium 8.3 mg/dL      eGFR   Amer --     Comment: <15 Indicative of kidney  failure.        eGFR Non African Amer 6 mL/min/1.73      Comment: <15 Indicative of kidney failure.        BUN/Creatinine Ratio 6.6     Anion Gap 17.0 mmol/L     Narrative:       GFR Normal >60  Chronic Kidney Disease <60  Kidney Failure <15      CBC (No Diff) [[id number]]  (Abnormal) Collected:  03/13/20 2046    Specimen:  Blood Updated:  03/13/20 2118     WBC 8.30 10*3/mm3      RBC 3.22 10*6/mm3      Hemoglobin 9.9 g/dL      Hematocrit 29.9 %      MCV 93.1 fL      MCH 30.8 pg      MCHC 33.1 g/dL      RDW 18.4 %      RDW-SD 61.3 fl      MPV 7.8 fL      Platelets 349 10*3/mm3             Results for orders placed during the hospital encounter of 03/13/20   Adult Transthoracic Echo Complete W/ Cont if Necessary Per Protocol    Narrative · Estimated EF = 60%.  · Left ventricular systolic function is normal.     Indications  Shortness of breath    Technically satisfactory study.  Mitral prosthetic valve function is normal.  Tricuspid valve is structurally normal.  Moderate tricuspid regurgitation.  Calculated pulmonary artery pressure is 40 mmHg  Aortic valve is thickened with adequate opening motion.  Pulmonic valve could not be well visualized.  No evidence for mitral or aortic regurgitation is seen by Doppler study.  Left atrium is enlarged.  Right atrium-mild enlargement  Left ventricle is normal in size and contractility with ejection fraction   of 60%.  Right ventricle is normal in size.  Atrial septum is intact.  Aorta is normal.  No pericardial effusion or intracardiac thrombus is seen.    Impression  Mitral prosthetic valve function is normal.  Moderate tricuspid regurgitation is present.  Mild biatrial enlargement is present.  Left ventricular size and contractility is normal with ejection fraction   of 60%           Imaging Results (All)     Procedure Component Value Units Date/Time    XR Chest PA & Lateral [[id number]] Collected:  03/14/20 0929     Updated:  03/14/20 0933    Narrative:          DATE OF EXAM:    3/14/2020 9:00 AM     PROCEDURE:   XR CHEST PA AND LATERAL-     INDICATIONS:   plural effusion heart surgery on 02/14/2020. Shortness of air cough  today. Pleural effusion.     COMPARISON:  10/28/2019     TECHNIQUE:   Single frontal view chest     FINDINGS:   Today's study reveals the patient is status post sternotomy and surgical  replacement of heart valve. The heart is mildly enlarged and is  unchanged. Superior mediastinum is normal. There is a moderate sized  infiltrate pleural effusion right lower lobe along. No acute focal  arising left lung.        Impression:       IMPRESSION :      1. Status post sternotomy and surgical replacement of heart.  2. Moderate infiltrate pleural effusion right lower lobe along new since  the previous study     Electronically Signed By-DR. Ra Yang MD On:3/14/2020 9:31  AM  This report was finalized on [id number] by DR. Ra Yang MD.                Discharge Disposition:  Home or Self Care    Discharge Medications:     Discharge Medications      Changes to Medications      Instructions Start Date   atorvastatin 20 MG tablet  Commonly known as:  LIPITOR  What changed:  how much to take   20 mg, Oral, Nightly         Continue These Medications      Instructions Start Date   amLODIPine 5 MG tablet  Commonly known as:  NORVASC   5 mg, Oral, 2 Times Daily      aspirin 81 MG EC tablet   81 mg, Oral, Daily      cholecalciferol 25 MCG (1000 UT) tablet  Commonly known as:  VITAMIN D3   2 tablets, Oral, Daily      DULoxetine 60 MG capsule  Commonly known as:  CYMBALTA   60 mg, Oral, Daily      hydrALAZINE 25 MG tablet  Commonly known as:  APRESOLINE   25 mg, Oral, 3 Times Daily      HYDROcodone-acetaminophen  MG per tablet  Commonly known as:  NORCO   1 tablet, Oral, Every 6 Hours PRN      lanthanum 500 MG chewable tablet  Commonly known as:  FOSRENOL   500 mg, Oral, 2 Times Daily With Meals      losartan 50 MG tablet  Commonly known as:  COZAAR   50 mg,  Oral, Daily      LYRICA 150 MG capsule  Generic drug:  pregabalin   150 mg, Oral, Every Night at Bedtime      DE-NICKY tablet   1 tablet, Oral, Daily      SENSIPAR 30 MG tablet  Generic drug:  cinacalcet   1 tablet, Oral, Daily      ticagrelor 90 MG tablet tablet  Commonly known as:  BRILINTA   90 mg, Oral, 2 Times Daily      vitamin B-12 1000 MCG tablet  Commonly known as:  CYANOCOBALAMIN   1 tablet, Oral, Daily      warfarin 1 MG tablet  Commonly known as:  COUMADIN   0.5 mg, Oral, Daily Warfarin             Discharge Diet:   Diet Instructions     Diet: Cardiac, Renal      Discharge Diet:   Cardiac  Renal             Activity at Discharge:   Activity Instructions     Activity as Tolerated            Follow-up Appointments:  No future appointments.      Test Results Pending at Discharge:  None    Condition on Discharge:    stable     Risk for Readmission (LACE): Score: 8 (3/15/2020  6:00 AM)          Cherie Hopkins DO  03/15/20  13:47

## 2020-03-15 NOTE — PLAN OF CARE
Problem: Patient Care Overview  Goal: Plan of Care Review  Outcome: Ongoing (interventions implemented as appropriate)  Flowsheets (Taken 3/15/2020 0410)  Progress: no change  Plan of Care Reviewed With: patient  Outcome Summary: Patient in bed awake at this time.  Slept first part of the shift.  No c/o noted at this time.

## 2020-03-15 NOTE — PROGRESS NOTES
Referring Provider: Cherie Hopkins DO    Reason for follow-up:  Shortness of breath  Status post CABG and mitral valve replacement     Patient Care Team:  Naty Kraus DO as PCP - General (Family Medicine)  Naty Kraus DO as PCP - Claims Attributed  Anya Deleon as Technologist    Subjective .  Feeling better     ROS    Since I have last seen her yesterday, the patient has been without any chest discomfort ,shortness of breath, palpitations, dizziness or syncope.  Denies having any headache ,abdominal pain ,nausea, vomiting , diarrhea constipation, loss of weight or loss of appetite.  Denies having any excessive bruising ,hematuria or blood in the stool.    Review of all systems negative except as indicated    History  Past Medical History:   Diagnosis Date   • Anxiety    • Arthritis     osteoarthritis   • ASVD (arteriosclerotic vascular disease)     Abdominal Aorta   • CAD (coronary artery disease)    • Chronic anemia     Anemia of Chronic Disease   • Elevated cholesterol    • ESRD (end stage renal disease) (CMS/HCC)     With HD on TU/TH/SAT   • Fatty liver    • Gastritis     Non Erosive   • GERD (gastroesophageal reflux disease)    • Hiatal hernia    • Hypertension    • Peripheral neuropathy    • Seizure disorder (CMS/HCC)    • Type 2 diabetes mellitus (CMS/HCC)    • Urinary incontinence        Past Surgical History:   Procedure Laterality Date   • ARTERIOVENOUS FISTULA REPAIR Left 2017    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   • CARDIAC CATHETERIZATION N/A 11/1/2019    Procedure: Left Heart Cath and coronary angiogram;  Surgeon: Esdras Woodruff MD;  Location: [facility] CATH INVASIVE LOCATION;  Service: Cardiovascular   • CARDIAC CATHETERIZATION N/A 11/1/2019    Procedure: Stent SIRIA coronary;  Surgeon: Esdras Woodruff MD;  Location: [facility]  CATH INVASIVE LOCATION;  Service: Cardiovascular   • COLONOSCOPY  2004    2004, 2016   • CORONARY ANGIOPLASTY  2018   • ENDOSCOPY      x2   • GASTRIC BYPASS  2013    Converted to gastric sleeve 2014 due to non-healing ulcer   • LIVER BIOPSY     • ORIF ANKLE FRACTURE Left     Lt ankle Fx w/ORIF   • OTHER SURGICAL HISTORY Right 07/29/2011    Vitreous hemorrhage and retinal surgery right eye   • PA RT/LT HEART CATHETERS N/A 11/1/2019    Procedure: Percutaneous Coronary Intervention;  Surgeon: Esdras Woodruff MD;  Location: [facility] CATH INVASIVE LOCATION;  Service: Cardiovascular   • TOTAL ABDOMINAL HYSTERECTOMY      w/bladder susp/LSO       Family History   Problem Relation Age of Onset   • Diabetes Mother    • Hypertension Mother    • Coronary artery disease Mother    • Dementia Mother    • Heart disease Mother    • Diabetes Father    • Cancer Father         Lung Cancer       Social History     Tobacco Use   • Smoking status: Never Smoker   • Smokeless tobacco: Never Used   Substance Use Topics   • Alcohol use: No     Frequency: Never   • Drug use: No        Medications Prior to Admission   Medication Sig Dispense Refill Last Dose   • amLODIPine (NORVASC) 5 MG tablet Take 5 mg by mouth 2 (Two) Times a Day.   3/13/2020 at Unknown time   • aspirin 81 MG EC tablet Take 81 mg by mouth Daily.   3/13/2020 at Unknown time   • atorvastatin (LIPITOR) 20 MG tablet Take 1 tablet by mouth Every Night. (Patient taking differently: Take 40 mg by mouth Every Night.) 30 tablet 0 3/12/2020 at Unknown time   • B Complex-C-Folic Acid (DE-NICKY) tablet Take 1 tablet by mouth Daily. 90 each 3 3/13/2020 at Unknown time   • cholecalciferol (VITAMIN D3) 1000 units tablet Take 2 tablets by mouth Daily.   3/13/2020 at Unknown time   • cinacalcet (SENSIPAR) 30 MG tablet Take 1 tablet by mouth Daily.   3/13/2020 at Unknown time   • DULoxetine (CYMBALTA) 60 MG capsule Take 60 mg by mouth Daily.   3/13/2020 at Unknown time   • hydrALAZINE  (APRESOLINE) 25 MG tablet Take 25 mg by mouth 3 (Three) Times a Day.   3/13/2020 at Unknown time   • lanthanum (FOSRENOL) 500 MG chewable tablet Chew 500 mg 2 (Two) Times a Day With Meals.      • losartan (COZAAR) 50 MG tablet Take 50 mg by mouth Daily.   3/13/2020 at Unknown time   • LYRICA 150 MG capsule Take 1 capsule by mouth every night at bedtime. 90 capsule 1 3/12/2020 at Unknown time   • ticagrelor (BRILINTA) 90 MG tablet tablet Take 90 mg by mouth 2 (Two) Times a Day.   3/13/2020 at Unknown time   • vitamin B-12 (CYANOCOBALAMIN) 1000 MCG tablet Take 1 tablet by mouth Daily.   3/13/2020 at Unknown time   • warfarin (COUMADIN) 1 MG tablet Take 0.5 mg by mouth Daily.   3/13/2020 at Unknown time   • HYDROcodone-acetaminophen (NORCO)  MG per tablet Take 1 tablet by mouth Every 6 (Six) Hours As Needed for Moderate Pain .   More than a month at Unknown time       Allergies  Patient has no known allergies.    Scheduled Meds:  amLODIPine 5 mg Oral BID   aspirin 81 mg Oral Daily   atorvastatin 40 mg Oral Nightly   cholecalciferol 2,000 Units Oral Daily   cinacalcet 30 mg Oral Daily   DULoxetine 60 mg Oral Daily   hydrALAZINE 25 mg Oral TID   insulin lispro 0-7 Units Subcutaneous 4x Daily With Meals & Nightly   losartan 50 mg Oral Daily   multivitamin b complex with c 1 tablet Oral Daily   nitroglycerin 1 inch Topical Q6H   pregabalin 150 mg Oral Nightly   sodium chloride 10 mL Intravenous Q12H   ticagrelor 90 mg Oral BID   vitamin B-12 1,000 mcg Oral Daily   warfarin 0.5 mg Oral Daily     Continuous Infusions:  Pharmacy to dose warfarin      PRN Meds:.•  acetaminophen **OR** acetaminophen **OR** acetaminophen  •  dextrose  •  dextrose  •  glucagon (human recombinant)  •  HYDROcodone-acetaminophen  •  insulin lispro **AND** insulin lispro  •  ondansetron **OR** ondansetron  •  Pharmacy to dose warfarin  •  sodium chloride    Objective     VITAL SIGNS  Vitals:    03/14/20 2055 03/15/20 0326 03/15/20 0846 03/15/20  "0946   BP: 155/68 135/70 120/64 120/64   BP Location: Right arm Right arm     Patient Position: Lying Lying     Pulse: 89 87 90    Resp: 16 17     Temp: 98.3 °F (36.8 °C) 99 °F (37.2 °C)     TempSrc: Oral Oral     SpO2: 95% 92%     Weight:  63.4 kg (139 lb 12.4 oz)  63 kg (139 lb)   Height:    162.6 cm (64\")       Flowsheet Rows      First Filed Value   Admission Height  162.6 cm (64\") Documented at 03/13/2020 2007   Admission Weight  64.2 kg (141 lb 8.6 oz) Documented at 03/13/2020 2007            Intake/Output Summary (Last 24 hours) at 3/15/2020 1136  Last data filed at 3/15/2020 0936  Gross per 24 hour   Intake 720 ml   Output 2000 ml   Net -1280 ml        TELEMETRY: Sinus rhythm    Physical Exam:  The patient is alert, oriented and in no distress.  Vital signs as noted above.  Head and neck revealed no carotid bruits or jugular venous distention.  No thyromegaly or lymphadenopathy is present  Lungs clear.  No wheezing.  Breath sounds are normal bilaterally.  Heart normal first and second heart sounds.  No murmur. No precordial rub is present.  No gallop is present.  Abdomen soft and nontender.  No organomegaly is present.  Extremities with good peripheral pulses without any pedal edema.  Skin warm and dry.  Musculoskeletal system is grossly normal  CNS grossly normal      Results Review:   I reviewed the patient's new clinical results.  Lab Results (last 24 hours)     Procedure Component Value Units Date/Time    POC Glucose Once [[id number]]  (Normal) Collected:  03/15/20 0713    Specimen:  Blood Updated:  03/15/20 0721     Glucose 94 mg/dL      Comment: Serial Number: [id number]:  [id number]       Basic Metabolic Panel [[id number]]  (Abnormal) Collected:  03/15/20 0303    Specimen:  Blood Updated:  03/15/20 0426     Glucose 124 mg/dL      BUN 17 mg/dL      Creatinine 3.53 mg/dL      Sodium 138 mmol/L      Potassium 4.1 mmol/L      Chloride 102 mmol/L      CO2 24.0 mmol/L      Calcium 7.4 mg/dL      eGFR  "  Amer --     Comment: <15 Indicative of kidney failure.        eGFR Non African Amer 13 mL/min/1.73      Comment: <15 Indicative of kidney failure.        BUN/Creatinine Ratio 4.8     Anion Gap 12.0 mmol/L     Narrative:       GFR Normal >60  Chronic Kidney Disease <60  Kidney Failure <15      Protime-INR [[id number]]  (Abnormal) Collected:  03/15/20 0303    Specimen:  Blood Updated:  03/15/20 0355     Protime 12.9 Seconds      INR 1.29    POC Glucose Once [[id number]]  (Abnormal) Collected:  03/14/20 1149    Specimen:  Blood Updated:  03/14/20 1150     Glucose 132 mg/dL      Comment: Serial Number: [id number]:  [id number]       Hemoglobin A1c [[id number]]  (Normal) Collected:  03/13/20 2046    Specimen:  Blood Updated:  03/14/20 1136     Hemoglobin A1C 4.9 %     Narrative:       Hemoglobin A1C Reference Range:    <5.7 %        Normal  5.7-6.4 %     Increased risk for diabetes  > 6.4 %        Diabetes       These guidelines have been recommended by the American Diabetic Association for Hgb A1c.      The following 2010 guidelines have been recommended by the American Diabetes Association for Hemoglobin A1c.    HBA1c 5.7-6.4% Increased risk for future diabetes (pre-diabetes)  HBA1c     >6.4% Diabetes            Imaging Results (Last 24 Hours)     ** No results found for the last 24 hours. **      LAB RESULTS (LAST 7 DAYS)    CBC  Results from last 7 days   Lab Units 03/14/20  0130 03/13/20 2046   WBC 10*3/mm3 7.00 8.30   RBC 10*6/mm3 3.18* 3.22*   HEMOGLOBIN g/dL 9.4* 9.9*   HEMATOCRIT % 29.7* 29.9*   MCV fL 93.5 93.1   PLATELETS 10*3/mm3 362 349       BMP  Results from last 7 days   Lab Units 03/15/20  0303 03/14/20 0130 03/13/20 2046   SODIUM mmol/L 138 136 138   POTASSIUM mmol/L 4.1 3.9 4.1   CHLORIDE mmol/L 102 97* 99   CO2 mmol/L 24.0 22.0 22.0   BUN mg/dL 17 44* 43*   CREATININE mg/dL 3.53* 6.52* 6.49*   GLUCOSE mg/dL 124* 159* 188*   MAGNESIUM mg/dL  --  1.9  --    PHOSPHORUS mg/dL  --  4.6*  --           CMP Results from last 7 days   Lab Units 03/15/20  0303 03/14/20  0130 03/13/20  2046   SODIUM mmol/L 138 136 138   POTASSIUM mmol/L 4.1 3.9 4.1   CHLORIDE mmol/L 102 97* 99   CO2 mmol/L 24.0 22.0 22.0   BUN mg/dL 17 44* 43*   CREATININE mg/dL 3.53* 6.52* 6.49*   GLUCOSE mg/dL 124* 159* 188*         BNP        TROPONIN  Results from last 7 days   Lab Units 03/14/20  0749   TROPONIN T ng/mL 0.275*       CoAg  Results from last 7 days   Lab Units 03/15/20  0303 03/14/20  0749 03/14/20  0130   INR  1.29* 1.35* 1.34*       Creatinine Clearance  Estimated Creatinine Clearance: 15.8 mL/min (A) (by C-G formula based on SCr of 3.53 mg/dL (H)).    ABG        Radiology  Xr Chest Pa & Lateral    Result Date: 3/14/2020  IMPRESSION :  1. Status post sternotomy and surgical replacement of heart. 2. Moderate infiltrate pleural effusion right lower lobe along new since the previous study  Electronically Signed By-DR. Ra Yang MD On:3/14/2020 9:31 AM This report was finalized on [id number] by DR. Ra Yang MD.              EKG      I personally viewed and interpreted the patient's EKG/Telemetry data:    ECHOCARDIOGRAM:    Results for orders placed during the hospital encounter of 03/13/20   Adult Transthoracic Echo Complete W/ Cont if Necessary Per Protocol    Narrative · Estimated EF = 60%.  · Left ventricular systolic function is normal.     Indications  Shortness of breath    Technically satisfactory study.  Mitral prosthetic valve function is normal.  Tricuspid valve is structurally normal.  Moderate tricuspid regurgitation.  Calculated pulmonary artery pressure is 40 mmHg  Aortic valve is thickened with adequate opening motion.  Pulmonic valve could not be well visualized.  No evidence for mitral or aortic regurgitation is seen by Doppler study.  Left atrium is enlarged.  Right atrium-mild enlargement  Left ventricle is normal in size and contractility with ejection fraction   of 60%.  Right  ventricle is normal in size.  Atrial septum is intact.  Aorta is normal.  No pericardial effusion or intracardiac thrombus is seen.    Impression  Mitral prosthetic valve function is normal.  Moderate tricuspid regurgitation is present.  Mild biatrial enlargement is present.  Left ventricular size and contractility is normal with ejection fraction   of 60%               STRESS MYOVIEW:    Cardiolite (Tc-99m Sestamibi) stress test    CARDIAC CATHETERIZATION:            OTHER:         Assessment/Plan     Active Problems:    Elevated troponin    ]]]]]]]]]]]]]]]]]]]]  Impression  ==========  -Shortness of breath most likely due to missing dialysis recently.  Patient did not have completed dialysis on Thursday due to mother's death and going to  home.     - Mild elevation of troponin probably stress related due to fluid overload.     -Status post CABG x1 and mitral valve replacement with bioprosthetic valve performed at [facility] (details are not available.  Will obtain records from [facility])     -Status post stent to mid LAD 2019  -ESRD-      -Hypertension dyslipidemia.  Resolved diabetes since patient had weight loss from surgery.     -Anticoagulation patient is on Coumadin.      -History of gastric bypass .  Surgical history Donald-en-Y done in  converted to sleeve gastric bypass; hysterectomy; left ankle; AV fistula left arm; CABG with valve replacement, stent     -History of stroke.  Status post TPA 2018     -Non-smoker         Echocardiogram 3/15/2020  Mitral prosthetic valve function is normal.  Moderate tricuspid regurgitation is present.  Mild biatrial enlargement is present.  Left ventricular size and contractility is normal with ejection fraction of 60%     ===  Plan  ====  Shortness of breath is better with undergoing  Dialysis.  Troponin levels or slightly elevated but no bump.  EKG showed no acute changes.  Further plan will depend on patient's  progress.  Of note patient wants to go home today for mothers  on Monday    Echocardiogram 3/15/2020  Mitral prosthetic valve function is normal.  Moderate tricuspid regurgitation is present.  Mild biatrial enlargement is present.  Left ventricular size and contractility is normal with ejection fraction of 60%    Okay with discharge home.    Follow-up with primary cardiologist on regular basis.      Gabriele Lennon MD  03/15/20  11:36

## 2020-03-16 ENCOUNTER — READMISSION MANAGEMENT (OUTPATIENT)
Dept: CALL CENTER | Facility: HOSPITAL | Age: 65
End: 2020-03-16

## 2020-03-16 NOTE — PROGRESS NOTES
Discharge Planning Assessment   [facility]     Patient Name: Michelle Howell  MRN: [id number]  Today's Date: 3/16/2020    Admit Date: 3/13/2020          Plan    Final Discharge Disposition Code  01 - home or self-care    Final Note  return home          Carol naegele rn  Case management  Office number [phone]  Cell phone [phone]

## 2020-03-16 NOTE — OUTREACH NOTE
Prep Survey      Responses   [facility] facility patient discharged from?  [facility]   Is LACE score < 7 ?  No   Eligibility  Readm Mgmt   Discharge diagnosis  elevated troponin,   ESRD   Does the patient have one of the following disease processes/diagnoses(primary or secondary)?  Other   Does the patient have Home health ordered?  No   Is there a DME ordered?  No   Prep survey completed?  Yes          Divya Martins RN

## 2020-03-18 ENCOUNTER — READMISSION MANAGEMENT (OUTPATIENT)
Dept: CALL CENTER | Facility: HOSPITAL | Age: 65
End: 2020-03-18

## 2020-03-18 NOTE — OUTREACH NOTE
Medical Week 1 Survey      Responses   [facility] patient discharged from?  [facility]   Does the patient have one of the following disease processes/diagnoses(primary or secondary)?  Other   Is there a successful TCM telephone encounter documented?  No   Week 1 attempt successful?  Yes   Call start time  1225   Call end time  1227   Discharge diagnosis  elevated troponin,   ESRD   Meds reviewed with patient/caregiver?  Yes   Does the patient have all medications ordered at discharge?  N/A   Is the patient taking all medications as directed (includes completed medication regime)?  Yes   Does the patient have a primary care provider?   Yes   Does the patient have an appointment with their PCP within 7 days of discharge?  Yes   Has home health visited the patient within 72 hours of discharge?  Yes   Did the patient receive a copy of their discharge instructions?  Yes   Nursing interventions  Reviewed instructions with patient   What is the patient's perception of their health status since discharge?  Improving   Is the patient/caregiver able to teach back signs and symptoms related to disease process for when to call PCP?  Yes   Is the patient/caregiver able to teach back signs and symptoms related to disease process for when to call 911?  Yes   Is the patient/caregiver able to teach back the hierarchy of who to call/visit for symptoms/problems? PCP, Specialist, Home health nurse, Urgent Care, ED, 911  Yes   Additional teach back comments  Patient states visiting nurse comes in and check's her INR.  States she is doing well.     Week 1 call completed?  Yes   Graduated  Yes   Did the patient feel the follow up calls were helpful during their recovery period?  Yes   Was the number of calls appropriate?  Yes   Graduated/Revoked comments  No questions or needs at this time          Delmi Arzola LPN

## 2020-03-19 ENCOUNTER — EPISODE CHANGES (OUTPATIENT)
Dept: CASE MANAGEMENT | Facility: OTHER | Age: 65
End: 2020-03-19

## 2020-03-25 ENCOUNTER — TELEPHONE (OUTPATIENT)
Dept: FAMILY MEDICINE CLINIC | Facility: CLINIC | Age: 65
End: 2020-03-25

## 2020-03-25 DIAGNOSIS — F41.9 ANXIETY: Primary | ICD-10-CM

## 2020-03-25 RX ORDER — ALPRAZOLAM 0.25 MG/1
0.25 TABLET ORAL 2 TIMES DAILY PRN
COMMUNITY
Start: 2020-02-12 | End: 2020-03-25 | Stop reason: SDUPTHER

## 2020-03-25 RX ORDER — ALPRAZOLAM 0.25 MG/1
0.25 TABLET ORAL 2 TIMES DAILY PRN
Qty: 20 TABLET | Refills: 0 | Status: SHIPPED | OUTPATIENT
Start: 2020-03-25 | End: 2020-04-21 | Stop reason: SDUPTHER

## 2020-03-25 NOTE — TELEPHONE ENCOUNTER
Patient called and is requesting a refill of Xanax.  I do not see it as an active medication on med list.  Patient uses [facility]    Last Seen 10/14/2019  Pt had appt today for hospital f/u but cx'd due to her health history and not wanting to come out during the COVID pandemic.

## 2020-04-06 ENCOUNTER — TELEPHONE (OUTPATIENT)
Dept: FAMILY MEDICINE CLINIC | Facility: CLINIC | Age: 65
End: 2020-04-06

## 2020-04-06 NOTE — TELEPHONE ENCOUNTER
Pt called and states she was a previous patient of Dr. Kraus's in [place] office. Saw Dr Beyer on 6/7/19 and had a cyst lanced on her buttocks.  Then followed Dr Kraus down to [place]. Patient now states that Dr. Kraus is not reliable and patient has had recent CABG, wants to switch back to Dr Beyer and talk to Dr beyer about medications. Please advise if this switch is appropriate now.

## 2020-04-10 ENCOUNTER — PATIENT OUTREACH (OUTPATIENT)
Dept: CASE MANAGEMENT | Facility: OTHER | Age: 65
End: 2020-04-10

## 2020-04-10 ENCOUNTER — EPISODE CHANGES (OUTPATIENT)
Dept: CASE MANAGEMENT | Facility: OTHER | Age: 65
End: 2020-04-10

## 2020-04-10 NOTE — OUTREACH NOTE
Care Coordination Note    Called Dr NARINDER Porras office, spoke w Anya Deleon re her 4/6/20 conversation w/ pt.    Olivia Yun, RN  Ambulatory     4/10/2020, 11:15

## 2020-04-10 NOTE — OUTREACH NOTE
Patient Outreach Note    Called patient, not available, left msg.    Olivia Yun RN  Ambulatory     4/10/2020, 11:43

## 2020-04-13 ENCOUNTER — EPISODE CHANGES (OUTPATIENT)
Dept: CASE MANAGEMENT | Facility: OTHER | Age: 65
End: 2020-04-13

## 2020-04-15 ENCOUNTER — EPISODE CHANGES (OUTPATIENT)
Dept: CASE MANAGEMENT | Facility: OTHER | Age: 65
End: 2020-04-15

## 2020-04-20 NOTE — TELEPHONE ENCOUNTER
Ly[facility]    [facility] pharmacy    Seen 10/14/19   Cancelled appt on 3/23 due to covid  Not scheduled   *see phone note 4/6/20 about possibly switching back to [facility]*

## 2020-04-21 ENCOUNTER — EPISODE CHANGES (OUTPATIENT)
Dept: CASE MANAGEMENT | Facility: OTHER | Age: 65
End: 2020-04-21

## 2020-04-21 ENCOUNTER — OFFICE VISIT (OUTPATIENT)
Dept: FAMILY MEDICINE CLINIC | Facility: CLINIC | Age: 65
End: 2020-04-21

## 2020-04-21 DIAGNOSIS — I25.10 CORONARY ARTERY DISEASE INVOLVING NATIVE CORONARY ARTERY OF NATIVE HEART WITHOUT ANGINA PECTORIS: Primary | ICD-10-CM

## 2020-04-21 DIAGNOSIS — N18.6 END STAGE RENAL DISEASE (HCC): ICD-10-CM

## 2020-04-21 DIAGNOSIS — Z79.01 ANTICOAGULANT LONG-TERM USE: ICD-10-CM

## 2020-04-21 DIAGNOSIS — F41.9 ANXIETY: ICD-10-CM

## 2020-04-21 DIAGNOSIS — G62.9 PERIPHERAL POLYNEUROPATHY: ICD-10-CM

## 2020-04-21 DIAGNOSIS — M25.561 ACUTE PAIN OF RIGHT KNEE: ICD-10-CM

## 2020-04-21 PROCEDURE — 99214 OFFICE O/P EST MOD 30 MIN: CPT | Performed by: FAMILY MEDICINE

## 2020-04-21 RX ORDER — CYCLOBENZAPRINE HCL 10 MG
10 TABLET ORAL DAILY PRN
Start: 2020-04-21

## 2020-04-21 RX ORDER — CARVEDILOL 3.12 MG/1
3.12 TABLET ORAL 2 TIMES DAILY
COMMUNITY
Start: 2020-02-23 | End: 2020-07-01 | Stop reason: SDUPTHER

## 2020-04-21 RX ORDER — HYDROCODONE BITARTRATE AND ACETAMINOPHEN 5; 325 MG/1; MG/1
1 TABLET ORAL EVERY 6 HOURS PRN
Qty: 28 TABLET | Refills: 0 | Status: SHIPPED | OUTPATIENT
Start: 2020-04-21 | End: 2020-07-01 | Stop reason: SDUPTHER

## 2020-04-21 RX ORDER — ALPRAZOLAM 0.25 MG/1
0.25 TABLET ORAL 2 TIMES DAILY PRN
Qty: 20 TABLET | Refills: 0 | Status: SHIPPED | OUTPATIENT
Start: 2020-04-21 | End: 2020-07-01 | Stop reason: SDUPTHER

## 2020-04-21 NOTE — PROGRESS NOTES
Subjective   Michelle Howell is a 65 y.o. female.     Chief Complaint   Patient presents with   • Cardiac Valve Problem   • Coronary Artery Disease         Current Outpatient Medications:   •  ALPRAZolam (XANAX) 0.25 MG tablet, Take 1 tablet by mouth 2 (Two) Times a Day As Needed for Anxiety., Disp: 20 tablet, Rfl: 0  •  amLODIPine (NORVASC) 5 MG tablet, Take 5 mg by mouth Daily., Disp: , Rfl:   •  aspirin 81 MG EC tablet, Take 81 mg by mouth Daily., Disp: , Rfl:   •  atorvastatin (LIPITOR) 20 MG tablet, Take 1 tablet by mouth Every Night. (Patient taking differently: Take 40 mg by mouth Every Night.), Disp: 30 tablet, Rfl: 0  •  B Complex-C-Folic Acid (DE-NICKY) tablet, Take 1 tablet by mouth Daily., Disp: 90 each, Rfl: 3  •  cholecalciferol (VITAMIN D3) 1000 units tablet, Take 2 tablets by mouth Daily., Disp: , Rfl:   •  cinacalcet (SENSIPAR) 30 MG tablet, Take 1 tablet by mouth Daily., Disp: , Rfl:   •  HYDROcodone-acetaminophen (NORCO) 5-325 MG per tablet, Take 1 tablet by mouth Every 6 (Six) Hours As Needed for Severe Pain ., Disp: 28 tablet, Rfl: 0  •  lanthanum (FOSRENOL) 500 MG chewable tablet, Chew 500 mg 2 (Two) Times a Day With Meals., Disp: , Rfl:   •  LYRICA 150 MG capsule, Take 1 capsule by mouth every night at bedtime., Disp: 90 capsule, Rfl: 1  •  ticagrelor (BRILINTA) 90 MG tablet tablet, Take 90 mg by mouth 2 (Two) Times a Day., Disp: , Rfl:   •  vitamin B-12 (CYANOCOBALAMIN) 1000 MCG tablet, Take 1 tablet by mouth Daily., Disp: , Rfl:   •  warfarin (COUMADIN) 1 MG tablet, Take 1 mg by mouth Every Other Day., Disp: , Rfl:   •  carvedilol (COREG) 3.125 MG tablet, Take 3.125 mg by mouth 2 (Two) Times a Day., Disp: , Rfl:   •  cyclobenzaprine (FLEXERIL) 10 MG tablet, Take 1 tablet by mouth Daily As Needed for Muscle Spasms., Disp: , Rfl:     Past Medical History:   Diagnosis Date   • Anxiety    • Arthritis     osteoarthritis   • ASVD (arteriosclerotic vascular disease)     Abdominal Aorta   • CAD  (coronary artery disease)    • Chronic anemia     Anemia of Chronic Disease   • Elevated cholesterol    • ESRD (end stage renal disease) (CMS/HCC)     With HD on TU/TH/SAT   • Fatty liver    • Gastritis     Non Erosive   • GERD (gastroesophageal reflux disease)    • Hiatal hernia    • Hypertension    • Peripheral neuropathy    • Type 2 diabetes mellitus (CMS/HCC)    • Urinary incontinence        Past Surgical History:   Procedure Laterality Date   • ARTERIOVENOUS FISTULA REPAIR Left 2017    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   • CARDIAC CATHETERIZATION N/A 11/1/2019    Procedure: Left Heart Cath and coronary angiogram;  Surgeon: Esdras Woodruff MD;  Location: [facility] CATH INVASIVE LOCATION;  Service: Cardiovascular   • CARDIAC CATHETERIZATION N/A 11/1/2019    Procedure: Stent SIRIA coronary;  Surgeon: Esdras Woodruff MD;  Location: [facility] CATH INVASIVE LOCATION;  Service: Cardiovascular   • COLONOSCOPY  2004    2004, 2016   • CORONARY ANGIOPLASTY  2018   • CORONARY ARTERY BYPASS GRAFT  02/14/2020    CABG x 1/ MV Replacement   • ENDOSCOPY      x2   • GASTRIC BYPASS  2013    Converted to gastric sleeve 2014 due to non-healing ulcer   • LIVER BIOPSY     • ORIF ANKLE FRACTURE Left     Lt ankle Fx w/ORIF   • OTHER SURGICAL HISTORY Right 07/29/2011    Vitreous hemorrhage and retinal surgery right eye   • FL RT/LT HEART CATHETERS N/A 11/1/2019    Procedure: Percutaneous Coronary Intervention;  Surgeon: Esdras Woodruff MD;  Location: [facility] CATH INVASIVE LOCATION;  Service: Cardiovascular   • TOTAL ABDOMINAL HYSTERECTOMY      w/bladder susp/LSO       Family History   Problem Relation Age of Onset   • Diabetes Mother    • Hypertension Mother    • Coronary artery disease Mother    • Dementia Mother    • Heart disease Mother    • Diabetes Father    • Cancer Father          Lung Cancer       Social History     Socioeconomic History   • Marital status:      Spouse name: Not on file   • Number of children: Not on file   • Years of education: Not on file   • Highest education level: Not on file   Tobacco Use   • Smoking status: Never Smoker   • Smokeless tobacco: Never Used   Substance and Sexual Activity   • Alcohol use: No     Frequency: Never   • Drug use: No   • Sexual activity: Defer       64y/o C female here for tele visit for f/u from [facility] Feb 14 2020 for Mitral Valve Replacement and one vessel Bypass    Pt states she lost her mom right after her bypass sx and lost her  about a yr ago, but doing well overall w/o her anti-depressants; Pt states she was sent home on about 30pills and ended up gagging trying to take them all so she cut some out and needing to go over them today; Pt states she was worried about letting PTx and HHC in her house w/ the covid-19 going around so refused those services.....the patient trying to do her own exercises at home and sending her grown children to the store for her w/ masks    Pt admits she did fall getting into her car to go to HD, hitting her Rt side of  Her face and side and twisting her knee again----she got up and went to HD where they looked her over----Pt has been using the hosp Norco to help w/ the pains from this fall;    Pt states her coumadin level was increased from 0.5mg to 1mg qday because INR =1.8; then she started bruising and bleeding a lot, so she cut it back to 1mg QOD and has not had a f/u INR done since then---willing to go to [facility] for INR check this week    Pt feeling good overall and going to HD 3days a week now w/ better tolerance than she has had in the past (not cramping); not needing the ms relaxer but still using the xanax on HD days due to her high anxiety        Dr. Betts/  -----[facility]  Dialysis w/ Cat on Tu/Th/Sat am        The following portions of the patient's history were  reviewed and updated as appropriate: allergies, current medications, past family history, past medical history, past social history, past surgical history and problem list.    Review of Systems   Constitutional: Negative for activity change, appetite change, fatigue, unexpected weight gain and unexpected weight loss.   HENT: Positive for facial swelling (from fall).    Respiratory: Negative for cough, chest tightness and shortness of breath.    Cardiovascular: Negative for chest pain, palpitations and leg swelling.   Genitourinary: Negative for frequency, urgency and urinary incontinence.   Musculoskeletal: Positive for arthralgias, gait problem and joint swelling (knee pain after fall). Negative for myalgias.   Skin: Positive for bruise.   Neurological: Positive for numbness (in feet at nite). Negative for dizziness, facial asymmetry, speech difficulty, weakness, light-headedness, headache, memory problem and confusion.   Hematological: Bruises/bleeds easily.   Psychiatric/Behavioral: Negative for sleep disturbance and depressed mood.       There were no vitals filed for this visit.    Objective   Physical Exam   Constitutional: She is oriented to person, place, and time. No distress.   Pulmonary/Chest: No respiratory distress.   Neurological: She is alert and oriented to person, place, and time.   Psychiatric: She has a normal mood and affect. Her behavior is normal. Judgment and thought content normal.         Assessment/Plan   Michelle was seen today for cardiac valve problem and coronary artery disease.    Diagnoses and all orders for this visit:    Coronary artery disease involving native coronary artery of native heart without angina pectoris  -     Protime-INR Coumadin (Warfarin) Therapy yes; Standing    Acute pain of right knee  -     HYDROcodone-acetaminophen (NORCO) 5-325 MG per tablet; Take 1 tablet by mouth Every 6 (Six) Hours As Needed for Severe Pain .    Anxiety  -     ALPRAZolam (XANAX) 0.25 MG  tablet; Take 1 tablet by mouth 2 (Two) Times a Day As Needed for Anxiety.    Peripheral polyneuropathy  -     LYRICA 150 MG capsule; Take 1 capsule by mouth every night at bedtime.    End stage renal disease (CMS/HCC)    Anticoagulant long-term use  -     Protime-INR Coumadin (Warfarin) Therapy yes; Standing    Other orders  -     cyclobenzaprine (FLEXERIL) 10 MG tablet; Take 1 tablet by mouth Daily As Needed for Muscle Spasms.    New standing order for INR sent to [facility] and pt to do this in on Wed  Encouraged pt to safely cont exercising at home and try to be more safe going to HD  Pt has f/u w/ cardio soon and will disc restarting the chol med w/ him      This visit has been rescheduled as a phone visit to comply with patient safety concerns in accordance with CDC recommendations. Total time of discussion was 33 minutes.

## 2020-04-21 NOTE — TELEPHONE ENCOUNTER
Telephone note stated that Dr Porras would not accept her at this time and to continue to see Dr Kraus.

## 2020-07-01 ENCOUNTER — OFFICE VISIT (OUTPATIENT)
Dept: FAMILY MEDICINE CLINIC | Facility: CLINIC | Age: 65
End: 2020-07-01

## 2020-07-01 DIAGNOSIS — I25.10 CORONARY ARTERY DISEASE INVOLVING NATIVE CORONARY ARTERY OF NATIVE HEART WITHOUT ANGINA PECTORIS: ICD-10-CM

## 2020-07-01 DIAGNOSIS — G44.52 NEW DAILY PERSISTENT HEADACHE: Primary | ICD-10-CM

## 2020-07-01 DIAGNOSIS — F41.9 ANXIETY: ICD-10-CM

## 2020-07-01 DIAGNOSIS — M79.89 LEG SWELLING: ICD-10-CM

## 2020-07-01 PROBLEM — R55 SYNCOPE: Status: RESOLVED | Noted: 2018-05-14 | Resolved: 2020-07-01

## 2020-07-01 PROCEDURE — 99213 OFFICE O/P EST LOW 20 MIN: CPT | Performed by: FAMILY MEDICINE

## 2020-07-01 RX ORDER — FUROSEMIDE 20 MG/1
20 TABLET ORAL DAILY
Start: 2020-07-01 | End: 2020-07-29

## 2020-07-01 RX ORDER — ALPRAZOLAM 0.25 MG/1
0.25 TABLET ORAL 2 TIMES DAILY PRN
Qty: 20 TABLET | Refills: 0 | Status: SHIPPED | OUTPATIENT
Start: 2020-07-01 | End: 2020-08-24 | Stop reason: SDUPTHER

## 2020-07-01 RX ORDER — HYDROCODONE BITARTRATE AND ACETAMINOPHEN 5; 325 MG/1; MG/1
1 TABLET ORAL EVERY 6 HOURS PRN
Qty: 30 TABLET | Refills: 0 | Status: SHIPPED | OUTPATIENT
Start: 2020-07-01 | End: 2020-07-29 | Stop reason: SDUPTHER

## 2020-07-01 RX ORDER — AMLODIPINE BESYLATE 2.5 MG/1
2.5 TABLET ORAL DAILY
Qty: 90 TABLET | Refills: 0 | Status: SHIPPED | OUTPATIENT
Start: 2020-07-01 | End: 2020-09-25

## 2020-07-01 RX ORDER — CARVEDILOL 6.25 MG/1
6.25 TABLET ORAL 2 TIMES DAILY
Qty: 180 TABLET | Refills: 0 | Status: SHIPPED | OUTPATIENT
Start: 2020-07-01 | End: 2020-09-25

## 2020-07-01 NOTE — PROGRESS NOTES
Subjective   Michelle Howell is a 65 y.o. female.     Chief Complaint   Patient presents with   • Headache     HA x 3 weeks    • Pain     Patient needs a refill on Xanax and Norco    • Anxiety         Current Outpatient Medications:   •  ALPRAZolam (XANAX) 0.25 MG tablet, Take 1 tablet by mouth 2 (Two) Times a Day As Needed for Anxiety., Disp: 20 tablet, Rfl: 0  •  amLODIPine (NORVASC) 2.5 MG tablet, Take 1 tablet by mouth Daily., Disp: 90 tablet, Rfl: 0  •  aspirin 81 MG EC tablet, Take 81 mg by mouth Daily., Disp: , Rfl:   •  atorvastatin (LIPITOR) 20 MG tablet, Take 1 tablet by mouth Every Night. (Patient taking differently: Take 40 mg by mouth Every Night.), Disp: 30 tablet, Rfl: 0  •  B Complex-C-Folic Acid (DE-NICKY) tablet, Take 1 tablet by mouth Daily., Disp: 90 each, Rfl: 3  •  carvedilol (COREG) 6.25 MG tablet, Take 1 tablet by mouth 2 (Two) Times a Day., Disp: 180 tablet, Rfl: 0  •  cholecalciferol (VITAMIN D3) 1000 units tablet, Take 2 tablets by mouth Daily., Disp: , Rfl:   •  cinacalcet (SENSIPAR) 30 MG tablet, Take 1 tablet by mouth Daily., Disp: , Rfl:   •  cyclobenzaprine (FLEXERIL) 10 MG tablet, Take 1 tablet by mouth Daily As Needed for Muscle Spasms., Disp: , Rfl:   •  HYDROcodone-acetaminophen (NORCO) 5-325 MG per tablet, Take 1 tablet by mouth Every 6 (Six) Hours As Needed for Severe Pain ., Disp: 30 tablet, Rfl: 0  •  lanthanum (FOSRENOL) 500 MG chewable tablet, Chew 500 mg 2 (Two) Times a Day With Meals., Disp: , Rfl:   •  LYRICA 150 MG capsule, Take 1 capsule by mouth every night at bedtime., Disp: 90 capsule, Rfl: 1  •  ticagrelor (BRILINTA) 90 MG tablet tablet, Take 90 mg by mouth 2 (Two) Times a Day., Disp: , Rfl:   •  vitamin B-12 (CYANOCOBALAMIN) 1000 MCG tablet, Take 1 tablet by mouth Daily., Disp: , Rfl:   •  warfarin (COUMADIN) 1 MG tablet, Take 1 mg by mouth Every Other Day., Disp: , Rfl:   •  furosemide (Lasix) 20 MG tablet, Take 1 tablet by mouth Daily., Disp: , Rfl:     Past  Medical History:   Diagnosis Date   • Anxiety    • Arthritis     osteoarthritis   • ASVD (arteriosclerotic vascular disease)     Abdominal Aorta   • CAD (coronary artery disease)    • Chronic anemia     Anemia of Chronic Disease   • Elevated cholesterol    • ESRD (end stage renal disease) (CMS/HCC)     With HD on TU/TH/SAT   • Fatty liver    • Gastritis     Non Erosive   • GERD (gastroesophageal reflux disease)    • Hiatal hernia    • Hypertension    • Peripheral neuropathy    • Type 2 diabetes mellitus (CMS/HCC)    • Urinary incontinence        Past Surgical History:   Procedure Laterality Date   • ARTERIOVENOUS FISTULA REPAIR Left 2017    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   • CARDIAC CATHETERIZATION N/A 11/1/2019    Procedure: Left Heart Cath and coronary angiogram;  Surgeon: Esdras Woodruff MD;  Location: [facility] CATH INVASIVE LOCATION;  Service: Cardiovascular   • CARDIAC CATHETERIZATION N/A 11/1/2019    Procedure: Stent SIRIA coronary;  Surgeon: Esdras Woodruff MD;  Location: [facility] CATH INVASIVE LOCATION;  Service: Cardiovascular   • CARDIAC SURGERY  02/14/2020    open heart surgery    • COLONOSCOPY  2004    2004, 2016   • CORONARY ANGIOPLASTY  2018   • CORONARY ARTERY BYPASS GRAFT  02/14/2020    CABG x 1/ MV Replacement   • ENDOSCOPY      x2   • GASTRIC BYPASS  2013    Converted to gastric sleeve 2014 due to non-healing ulcer   • LIVER BIOPSY     • ORIF ANKLE FRACTURE Left     Lt ankle Fx w/ORIF   • OTHER SURGICAL HISTORY Right 07/29/2011    Vitreous hemorrhage and retinal surgery right eye   • ME RT/LT HEART CATHETERS N/A 11/1/2019    Procedure: Percutaneous Coronary Intervention;  Surgeon: Esdras Woodruff MD;  Location:  [facility] CATH INVASIVE LOCATION;  Service: Cardiovascular   • TOTAL ABDOMINAL HYSTERECTOMY      w/bladder susp/LSO       Family  History   Problem Relation Age of Onset   • Diabetes Mother    • Hypertension Mother    • Coronary artery disease Mother    • Dementia Mother    • Heart disease Mother    • Diabetes Father    • Cancer Father         Lung Cancer       Social History     Socioeconomic History   • Marital status:      Spouse name: Not on file   • Number of children: Not on file   • Years of education: Not on file   • Highest education level: Not on file   Tobacco Use   • Smoking status: Never Smoker   • Smokeless tobacco: Never Used   Substance and Sexual Activity   • Alcohol use: No     Frequency: Never   • Drug use: No   • Sexual activity: Defer       64 y/o C female on phone w/ c/o's daily HA and f/u on Anx/ Pain    Pt w/ c/o's HA's x 3 weeks.......the patient is going to HD 3x/ wk---------she was started on qday lasix x 1mo by RENAL; Pt tried Tyl w/o success and norco which does help but knocks her out...... HA's are in the front and throbbing in nature; Pt is sleeping and eating well----- Pt doesn't usu have HA's; Admits taking Pseudaphedrine helps the HA too    Pt states she was started on Norvasc in Feb after CABG Sx and Renal put her on lasix for LE swelling about 1 month ago...... Her BP has been up and down at HD.......  Pt admits she hasn't had her CAD f/u appt due to COVID and no INR has been done either-------RENAL decreased the coumadin dose recently due to excessive bleeding w/ HD------Pt will soon be starting home peritoneal dialysis    Pt needing more pain meds and the xanax she takes before HD       The following portions of the patient's history were reviewed and updated as appropriate: allergies, current medications, past family history, past medical history, past social history, past surgical history and problem list.    Review of Systems   Constitutional: Negative for activity change, appetite change, fatigue, unexpected weight gain and unexpected weight loss.   HENT: Positive for sinus pressure. Negative  for congestion, postnasal drip, rhinorrhea and sore throat.    Eyes: Negative for blurred vision, double vision, pain and visual disturbance.   Respiratory: Negative for shortness of breath.    Cardiovascular: Positive for leg swelling.   Gastrointestinal: Negative for abdominal distention, abdominal pain, constipation, diarrhea, nausea, vomiting, GERD and indigestion.   Endocrine: Negative for polydipsia, polyphagia and polyuria.   Genitourinary: Negative for frequency.   Musculoskeletal: Negative for gait problem and neck stiffness.   Skin: Negative for color change, dry skin, rash and skin lesions.   Neurological: Positive for headache. Negative for dizziness, weakness, light-headedness and numbness.   Hematological: Bruises/bleeds easily.   Psychiatric/Behavioral: Negative for sleep disturbance.       There were no vitals filed for this visit.    Objective   Physical Exam   Constitutional: She is oriented to person, place, and time. No distress.   Pulmonary/Chest: No respiratory distress.   Neurological: She is alert and oriented to person, place, and time. No cranial nerve deficit.   Psychiatric: She has a normal mood and affect. Her speech is normal and behavior is normal. Judgment and thought content normal. Cognition and memory are normal.         Assessment/Plan   Michelle was seen today for headache, pain and anxiety.    Diagnoses and all orders for this visit:    New daily persistent headache  -     HYDROcodone-acetaminophen (NORCO) 5-325 MG per tablet; Take 1 tablet by mouth Every 6 (Six) Hours As Needed for Severe Pain .    Leg swelling    Coronary artery disease involving native coronary artery of native heart without angina pectoris    Anxiety  -     ALPRAZolam (XANAX) 0.25 MG tablet; Take 1 tablet by mouth 2 (Two) Times a Day As Needed for Anxiety.    Other orders  -     furosemide (Lasix) 20 MG tablet; Take 1 tablet by mouth Daily.  -     carvedilol (COREG) 6.25 MG tablet; Take 1 tablet by mouth 2  (Two) Times a Day.  -     amLODIPine (NORVASC) 2.5 MG tablet; Take 1 tablet by mouth Daily.    DECREASE Norvasc due to poss LE Swelling side effect  INCREASE Coreg dose for BP control  Pt to Reschedule Cardio f/u and INR level    You have chosen to receive care through a telephone visit. Do you consent to use a telephone visit for your medical care today? {YES  This visit has been rescheduled as a phone visit to comply with patient safety concerns in accordance with CDC recommendations. Total time of discussion was 15 minutes.

## 2020-07-13 ENCOUNTER — TELEPHONE (OUTPATIENT)
Dept: FAMILY MEDICINE CLINIC | Facility: CLINIC | Age: 65
End: 2020-07-13

## 2020-07-13 NOTE — TELEPHONE ENCOUNTER
Patient called stating that she wanted to talk to  about ROZINA. Patient states that  diagnosed her with IBS in the 1990s because she had diarrhea and nausea a lot and it wondering if she was misdiagnosed. Patient wants to know if  can call her about this or does she need to make an appt?

## 2020-07-29 ENCOUNTER — OFFICE VISIT (OUTPATIENT)
Dept: FAMILY MEDICINE CLINIC | Facility: CLINIC | Age: 65
End: 2020-07-29

## 2020-07-29 VITALS — HEART RATE: 83 BPM | DIASTOLIC BLOOD PRESSURE: 78 MMHG | SYSTOLIC BLOOD PRESSURE: 155 MMHG

## 2020-07-29 DIAGNOSIS — M25.50 POLYARTHRALGIA: ICD-10-CM

## 2020-07-29 DIAGNOSIS — K52.9 CHRONIC DIARRHEA: ICD-10-CM

## 2020-07-29 DIAGNOSIS — F41.9 ANXIETY: ICD-10-CM

## 2020-07-29 DIAGNOSIS — I10 ESSENTIAL HYPERTENSION: Primary | ICD-10-CM

## 2020-07-29 DIAGNOSIS — N18.6 END STAGE RENAL DISEASE (HCC): ICD-10-CM

## 2020-07-29 PROCEDURE — 99214 OFFICE O/P EST MOD 30 MIN: CPT | Performed by: FAMILY MEDICINE

## 2020-07-29 RX ORDER — ONDANSETRON 4 MG/1
4 TABLET, FILM COATED ORAL EVERY 8 HOURS PRN
Qty: 60 TABLET | Refills: 0 | Status: SHIPPED | OUTPATIENT
Start: 2020-07-29

## 2020-07-29 RX ORDER — FUROSEMIDE 20 MG/1
20 TABLET ORAL DAILY PRN
Status: SHIPPED
Start: 2020-07-29 | End: 2020-09-04

## 2020-07-29 RX ORDER — DICYCLOMINE HCL 20 MG
20 TABLET ORAL EVERY 6 HOURS PRN
Qty: 60 TABLET | Refills: 0 | Status: SHIPPED | OUTPATIENT
Start: 2020-07-29 | End: 2020-08-06 | Stop reason: SDUPTHER

## 2020-07-29 RX ORDER — HYDROCODONE BITARTRATE AND ACETAMINOPHEN 5; 325 MG/1; MG/1
1 TABLET ORAL EVERY 6 HOURS PRN
Qty: 30 TABLET | Refills: 0 | Status: SHIPPED | OUTPATIENT
Start: 2020-07-29 | End: 2020-09-04 | Stop reason: SDUPTHER

## 2020-07-29 NOTE — PROGRESS NOTES
Subjective   Michelle Howell is a 65 y.o. female.     Chief Complaint   Patient presents with   • Diarrhea   • Pain   • Hypertension         Current Outpatient Medications:   •  ALPRAZolam (XANAX) 0.25 MG tablet, Take 1 tablet by mouth 2 (Two) Times a Day As Needed for Anxiety., Disp: 20 tablet, Rfl: 0  •  amLODIPine (NORVASC) 2.5 MG tablet, Take 1 tablet by mouth Daily., Disp: 90 tablet, Rfl: 0  •  aspirin 81 MG EC tablet, Take 81 mg by mouth Daily., Disp: , Rfl:   •  atorvastatin (LIPITOR) 20 MG tablet, Take 1 tablet by mouth Every Night. (Patient taking differently: Take 40 mg by mouth Every Night.), Disp: 30 tablet, Rfl: 0  •  B Complex-C-Folic Acid (DE-NICKY) tablet, Take 1 tablet by mouth Daily., Disp: 90 each, Rfl: 3  •  carvedilol (COREG) 6.25 MG tablet, Take 1 tablet by mouth 2 (Two) Times a Day., Disp: 180 tablet, Rfl: 0  •  cholecalciferol (VITAMIN D3) 1000 units tablet, Take 2 tablets by mouth Daily., Disp: , Rfl:   •  cinacalcet (SENSIPAR) 30 MG tablet, Take 1 tablet by mouth Daily., Disp: , Rfl:   •  cyclobenzaprine (FLEXERIL) 10 MG tablet, Take 1 tablet by mouth Daily As Needed for Muscle Spasms., Disp: , Rfl:   •  dicyclomine (BENTYL) 20 MG tablet, Take 1 tablet by mouth Every 6 (Six) Hours As Needed (cramping/ diarrhea)., Disp: 60 tablet, Rfl: 0  •  furosemide (Lasix) 20 MG tablet, Take 1 tablet by mouth Daily As Needed., Disp: , Rfl:   •  HYDROcodone-acetaminophen (NORCO) 5-325 MG per tablet, Take 1 tablet by mouth Every 6 (Six) Hours As Needed for Severe Pain ., Disp: 30 tablet, Rfl: 0  •  lanthanum (FOSRENOL) 500 MG chewable tablet, Chew 500 mg 2 (Two) Times a Day With Meals., Disp: , Rfl:   •  LYRICA 150 MG capsule, Take 1 capsule by mouth every night at bedtime., Disp: 90 capsule, Rfl: 1  •  ondansetron (Zofran) 4 MG tablet, Take 1 tablet by mouth Every 8 (Eight) Hours As Needed for Nausea or Vomiting., Disp: 60 tablet, Rfl: 0  •  ticagrelor (BRILINTA) 90 MG tablet tablet, Take 90 mg by mouth  2 (Two) Times a Day., Disp: , Rfl:   •  vitamin B-12 (CYANOCOBALAMIN) 1000 MCG tablet, Take 1 tablet by mouth Daily., Disp: , Rfl:     Past Medical History:   Diagnosis Date   • Anxiety    • Arthritis     osteoarthritis   • ASVD (arteriosclerotic vascular disease)     Abdominal Aorta   • CAD (coronary artery disease)    • Chronic anemia     Anemia of Chronic Disease   • Elevated cholesterol    • ESRD (end stage renal disease) (CMS/HCC)     With HD on TU/TH/SAT   • Fatty liver    • Gastritis     Non Erosive   • GERD (gastroesophageal reflux disease)    • Hiatal hernia    • Hypertension    • Peripheral neuropathy    • Type 2 diabetes mellitus (CMS/HCC)    • Urinary incontinence        Past Surgical History:   Procedure Laterality Date   • ARTERIOVENOUS FISTULA REPAIR Left 2017    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   • CARDIAC CATHETERIZATION N/A 11/1/2019    Procedure: Left Heart Cath and coronary angiogram;  Surgeon: Esdras Woodruff MD;  Location: [facility] CATH INVASIVE LOCATION;  Service: Cardiovascular   • CARDIAC CATHETERIZATION N/A 11/1/2019    Procedure: Stent SIRIA coronary;  Surgeon: Esdras Woodruff MD;  Location: [facility] CATH INVASIVE LOCATION;  Service: Cardiovascular   • CARDIAC SURGERY  02/14/2020    open heart surgery    • COLONOSCOPY  2004    2004, 2016   • CORONARY ANGIOPLASTY  2018   • CORONARY ARTERY BYPASS GRAFT  02/14/2020    CABG x 1/ MV Replacement   • ENDOSCOPY      x2   • GASTRIC BYPASS  2013    Converted to gastric sleeve 2014 due to non-healing ulcer   • LIVER BIOPSY     • ORIF ANKLE FRACTURE Left     Lt ankle Fx w/ORIF   • OTHER SURGICAL HISTORY Right 07/29/2011    Vitreous hemorrhage and retinal surgery right eye   • AR RT/LT HEART CATHETERS N/A 11/1/2019    Procedure: Percutaneous Coronary Intervention;  Surgeon: Esdras Woodruff MD;   Location: [facility] CATH INVASIVE LOCATION;  Service: Cardiovascular   • TOTAL ABDOMINAL HYSTERECTOMY      w/bladder susp/LSO       Family History   Problem Relation Age of Onset   • Diabetes Mother    • Hypertension Mother    • Coronary artery disease Mother    • Dementia Mother    • Heart disease Mother    • Diabetes Father    • Cancer Father         Lung Cancer       Social History     Socioeconomic History   • Marital status:      Spouse name: Not on file   • Number of children: Not on file   • Years of education: Not on file   • Highest education level: Not on file   Tobacco Use   • Smoking status: Never Smoker   • Smokeless tobacco: Never Used   Substance and Sexual Activity   • Alcohol use: No     Frequency: Never   • Drug use: No   • Sexual activity: Defer       66 y/o C female on phone to disc poss of pancreatic insuff/ ESRD/ HTN/ Pain    Pt states she was told she had IBS but wondering if she could have pancreatic insuff-----she has a lot of cramping and diarrhea; she did try low dose bentyl but it didn't help    Pt tried doing peritoneal HD at home but the cath got infected and will need taken out in Aug; Pt now going to HD onTu/ Th/ Sat @ noon-3pm and getting IV abx during this time to treat the infection    Pt also states she sold her house and moved to an apt across the street from her HD center -----  Pt w/ a W/D in her apt which is also on the ground floor and has handicap bars in the bathroom-----pt is in a 2 bedroom for her and her 11 y/o granddaughter (who she is raising); She still drives but her 15y/o granddaughter helps her sometimes when she needs it    Pt states she had to take more of her pain meds over the past month because of the move       The following portions of the patient's history were reviewed and updated as appropriate: allergies, current medications, past family history, past medical history, past social history, past surgical history and problem list.    Review of Systems    Constitutional: Negative for activity change, appetite change, fatigue, unexpected weight gain and unexpected weight loss.   Respiratory: Negative for cough, chest tightness and shortness of breath.    Cardiovascular: Negative for chest pain, palpitations and leg swelling.   Gastrointestinal: Positive for abdominal pain (cramping a lot) and diarrhea. Negative for abdominal distention, constipation, GERD and indigestion.   Genitourinary: Negative for frequency, urgency and urinary incontinence.   Musculoskeletal: Positive for arthralgias. Negative for myalgias.   Neurological: Negative for dizziness, facial asymmetry, speech difficulty, weakness, light-headedness, headache, memory problem and confusion.   Psychiatric/Behavioral: Negative for sleep disturbance.       Vitals:    07/29/20 1111   BP: 155/78   Pulse: 83       Objective   Physical Exam   Constitutional: She is oriented to person, place, and time. No distress.   Pulmonary/Chest: No respiratory distress.   Neurological: She is alert and oriented to person, place, and time.   Psychiatric: She has a normal mood and affect. Her behavior is normal. Judgment and thought content normal.         Assessment/Plan   Michelle was seen today for diarrhea, pain and hypertension.    Diagnoses and all orders for this visit:    Essential hypertension    Chronic diarrhea    Polyarthralgia  -     HYDROcodone-acetaminophen (NORCO) 5-325 MG per tablet; Take 1 tablet by mouth Every 6 (Six) Hours As Needed for Severe Pain .    Anxiety    End stage renal disease (CMS/[facility])    Other orders  -     furosemide (Lasix) 20 MG tablet; Take 1 tablet by mouth Daily As Needed.  -     ondansetron (Zofran) 4 MG tablet; Take 1 tablet by mouth Every 8 (Eight) Hours As Needed for Nausea or Vomiting.  -     dicyclomine (BENTYL) 20 MG tablet; Take 1 tablet by mouth Every 6 (Six) Hours As Needed (cramping/ diarrhea).    Refill [place]  TRIAL of higher dose of Bentyl prn  May consider trying Zenpep  prn    You have chosen to receive care through a telephone visit. Do you consent to use a telephone visit for your medical care today? {YES  This visit has been rescheduled as a phone visit to comply with patient safety concerns in accordance with CDC recommendations. Total time of discussion was 25 minutes.

## 2020-08-06 ENCOUNTER — TELEPHONE (OUTPATIENT)
Dept: FAMILY MEDICINE CLINIC | Facility: CLINIC | Age: 65
End: 2020-08-06

## 2020-08-06 RX ORDER — DICYCLOMINE HCL 20 MG
20 TABLET ORAL EVERY 6 HOURS PRN
Qty: 180 TABLET | Refills: 0 | Status: SHIPPED | OUTPATIENT
Start: 2020-08-06 | End: 2020-09-18

## 2020-08-06 NOTE — TELEPHONE ENCOUNTER
Dicyclomine 20mg (Requesting 90 day supply)    [facility]      Last Seen 7/29/2020  No future appt sched.

## 2020-08-24 ENCOUNTER — TELEPHONE (OUTPATIENT)
Dept: FAMILY MEDICINE CLINIC | Facility: CLINIC | Age: 65
End: 2020-08-24

## 2020-08-24 DIAGNOSIS — F41.9 ANXIETY: ICD-10-CM

## 2020-08-24 RX ORDER — ALPRAZOLAM 0.25 MG/1
0.25 TABLET ORAL 2 TIMES DAILY PRN
Qty: 20 TABLET | Refills: 0 | Status: SHIPPED | OUTPATIENT
Start: 2020-08-24 | End: 2020-09-11

## 2020-08-24 NOTE — TELEPHONE ENCOUNTER
Patient called stating that she has been having a lot of constipation and is wanting something for that.     Also she is requesting Alprazolam refill.

## 2020-08-26 ENCOUNTER — TELEPHONE (OUTPATIENT)
Dept: FAMILY MEDICINE CLINIC | Facility: CLINIC | Age: 65
End: 2020-08-26

## 2020-08-26 NOTE — TELEPHONE ENCOUNTER
Pt having constipation since she had a procedure.  She states she has tried otc meds and enemas.  Nothing helping.  [facility].  Last seen 7/2020

## 2020-08-26 NOTE — TELEPHONE ENCOUNTER
She has tried ex-lax, fleet enemas and suppositories, stool softner w Sen[place] (as rec by dialysis - which allowed her to pass a very little 2 days ago). Still having a lot of abd cramping/pain/ bloating. No BM x 2 days

## 2020-09-04 ENCOUNTER — TELEPHONE (OUTPATIENT)
Dept: FAMILY MEDICINE CLINIC | Facility: CLINIC | Age: 65
End: 2020-09-04

## 2020-09-04 DIAGNOSIS — M25.50 POLYARTHRALGIA: ICD-10-CM

## 2020-09-04 RX ORDER — FUROSEMIDE 80 MG
80 TABLET ORAL 2 TIMES DAILY
Status: SHIPPED
Start: 2020-09-04

## 2020-09-04 RX ORDER — HYDROCODONE BITARTRATE AND ACETAMINOPHEN 5; 325 MG/1; MG/1
1 TABLET ORAL EVERY 6 HOURS PRN
Qty: 30 TABLET | Refills: 0 | Status: SHIPPED | OUTPATIENT
Start: 2020-09-04 | End: 2020-09-22 | Stop reason: SDUPTHER

## 2020-09-04 NOTE — TELEPHONE ENCOUNTER
Patient called stating that she has bad swelling in her legs and she has already told the dialysis girls and she is on lasix 80mg BID. Patient is calling because the Lasix 80mg isn't helping and she wanted to see if there was something you can do.     Patient also states that she is almost out of her pain medication and would like a refill.

## 2020-09-04 NOTE — TELEPHONE ENCOUNTER
I dont think the lasix is doing anything---did she call her RENAL DrAruna???   How much pain med is she taking and why?

## 2020-09-04 NOTE — TELEPHONE ENCOUNTER
Yes, pt states sw renal dr and he increased lasix to 80 bid.     She takes Norco 5mg 1poqd for her chronic back pain.    Also noted that she feels like bugs are crawling all over her, not s/e of pain meds as taken for a long time.

## 2020-09-10 ENCOUNTER — TELEPHONE (OUTPATIENT)
Dept: FAMILY MEDICINE CLINIC | Facility: CLINIC | Age: 65
End: 2020-09-10

## 2020-09-10 NOTE — TELEPHONE ENCOUNTER
Patient called stating that she would like:     1)Her depression medication changed because it isn't working.    2)Patient would like a refill on the xanax, since she hasn't been doing good on the depression medication she has been taking the xanax at night to help her sleep.     3)She spoke with her kidney doctor about when she is eating sometimes its hard for her to get food to go down and he thinks she needs referred to GI to see if her esophagus needs stretched.

## 2020-09-11 ENCOUNTER — OFFICE VISIT (OUTPATIENT)
Dept: FAMILY MEDICINE CLINIC | Facility: CLINIC | Age: 65
End: 2020-09-11

## 2020-09-11 DIAGNOSIS — F41.8 MIXED ANXIETY DEPRESSIVE DISORDER: ICD-10-CM

## 2020-09-11 DIAGNOSIS — F41.9 ANXIETY: ICD-10-CM

## 2020-09-11 DIAGNOSIS — F51.02 ADJUSTMENT INSOMNIA: Primary | ICD-10-CM

## 2020-09-11 PROCEDURE — 99442 PR PHYS/QHP TELEPHONE EVALUATION 11-20 MIN: CPT | Performed by: FAMILY MEDICINE

## 2020-09-11 RX ORDER — BUPROPION HYDROCHLORIDE 150 MG/1
150 TABLET ORAL DAILY
Qty: 90 TABLET | Refills: 1 | Status: SHIPPED | OUTPATIENT
Start: 2020-09-11

## 2020-09-11 RX ORDER — ALPRAZOLAM 0.25 MG/1
0.25 TABLET ORAL 2 TIMES DAILY PRN
Qty: 30 TABLET | Refills: 0 | Status: SHIPPED | OUTPATIENT
Start: 2020-09-11 | End: 2020-09-29 | Stop reason: SDUPTHER

## 2020-09-11 NOTE — PROGRESS NOTES
Subjective   Michelle Howell is a 65 y.o. female.     Chief Complaint   Patient presents with   • Depression   • Anxiety         Current Outpatient Medications:   •  ALPRAZolam (XANAX) 0.25 MG tablet, Take 1 tablet by mouth 2 (Two) Times a Day As Needed for Anxiety., Disp: 30 tablet, Rfl: 0  •  amLODIPine (NORVASC) 2.5 MG tablet, Take 1 tablet by mouth Daily., Disp: 90 tablet, Rfl: 0  •  aspirin 81 MG EC tablet, Take 81 mg by mouth Daily., Disp: , Rfl:   •  atorvastatin (LIPITOR) 20 MG tablet, Take 1 tablet by mouth Every Night. (Patient taking differently: Take 40 mg by mouth Every Night.), Disp: 30 tablet, Rfl: 0  •  B Complex-C-Folic Acid (DE-NICKY) tablet, Take 1 tablet by mouth Daily., Disp: 90 each, Rfl: 3  •  carvedilol (COREG) 6.25 MG tablet, Take 1 tablet by mouth 2 (Two) Times a Day., Disp: 180 tablet, Rfl: 0  •  cholecalciferol (VITAMIN D3) 1000 units tablet, Take 2 tablets by mouth Daily., Disp: , Rfl:   •  cinacalcet (SENSIPAR) 30 MG tablet, Take 1 tablet by mouth Daily., Disp: , Rfl:   •  cyclobenzaprine (FLEXERIL) 10 MG tablet, Take 1 tablet by mouth Daily As Needed for Muscle Spasms., Disp: , Rfl:   •  furosemide (LASIX) 80 MG tablet, Take 1 tablet by mouth 2 (Two) Times a Day., Disp: , Rfl:   •  HYDROcodone-acetaminophen (NORCO) 5-325 MG per tablet, Take 1 tablet by mouth Every 6 (Six) Hours As Needed for Severe Pain ., Disp: 30 tablet, Rfl: 0  •  lanthanum (FOSRENOL) 500 MG chewable tablet, Chew 500 mg 2 (Two) Times a Day With Meals., Disp: , Rfl:   •  LYRICA 150 MG capsule, Take 1 capsule by mouth every night at bedtime., Disp: 90 capsule, Rfl: 1  •  ondansetron (Zofran) 4 MG tablet, Take 1 tablet by mouth Every 8 (Eight) Hours As Needed for Nausea or Vomiting., Disp: 60 tablet, Rfl: 0  •  ticagrelor (BRILINTA) 90 MG tablet tablet, Take 90 mg by mouth 2 (Two) Times a Day., Disp: , Rfl:   •  vitamin B-12 (CYANOCOBALAMIN) 1000 MCG tablet, Take 1 tablet by mouth Daily., Disp: , Rfl:   •  buPROPion  XL (WELLBUTRIN XL) 150 MG 24 hr tablet, Take 1 tablet by mouth Daily., Disp: 90 tablet, Rfl: 1  •  dicyclomine (BENTYL) 20 MG tablet, TAKE 1 TABLET BY MOUTH EVERY 6 (SIX) HOURS AS NEEDED (CRAMPING/ DIARRHEA)., Disp: 120 tablet, Rfl: 1  •  Lemborexant (DayVigo) 5 MG tablet, Take 1 tablet by mouth At Night As Needed (insomnia). May go to 2 po qhs prn insomnia  Indications: Trouble Sleeping, Disp: 10 tablet, Rfl: 0    Past Medical History:   Diagnosis Date   • Anxiety    • Arthritis     osteoarthritis   • ASVD (arteriosclerotic vascular disease)     Abdominal Aorta   • CAD (coronary artery disease)    • Chronic anemia     Anemia of Chronic Disease   • Elevated cholesterol    • ESRD (end stage renal disease) (CMS/HCC)     With HD on TU/TH/SAT   • Fatty liver    • Gastritis     Non Erosive   • GERD (gastroesophageal reflux disease)    • Hiatal hernia    • Hypertension    • Peripheral neuropathy    • Type 2 diabetes mellitus (CMS/HCC)    • Urinary incontinence        Past Surgical History:   Procedure Laterality Date   • ARTERIOVENOUS FISTULA REPAIR Left 2017    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   • CARDIAC CATHETERIZATION N/A 11/1/2019    Procedure: Left Heart Cath and coronary angiogram;  Surgeon: Esdras Woodruff MD;  Location: [facility] CATH INVASIVE LOCATION;  Service: Cardiovascular   • CARDIAC CATHETERIZATION N/A 11/1/2019    Procedure: Stent SIRIA coronary;  Surgeon: Esdras Woodruff MD;  Location: [facility] CATH INVASIVE LOCATION;  Service: Cardiovascular   • CARDIAC SURGERY  02/14/2020    open heart surgery    • COLONOSCOPY  2004    2004, 2016   • CORONARY ANGIOPLASTY  2018   • CORONARY ARTERY BYPASS GRAFT  02/14/2020    CABG x 1/ MV Replacement   • ENDOSCOPY      x2   • GASTRIC BYPASS  2013    Converted to gastric sleeve 2014 due to non-healing ulcer   •  LIVER BIOPSY     • ORIF ANKLE FRACTURE Left     Lt ankle Fx w/ORIF   • OTHER SURGICAL HISTORY Right 07/29/2011    Vitreous hemorrhage and retinal surgery right eye   • [place] RT/LT HEART CATHETERS N/A 11/1/2019    Procedure: Percutaneous Coronary Intervention;  Surgeon: Esdras Woodruff MD;  Location: [facility] INVASIVE LOCATION;  Service: Cardiovascular   • TOTAL ABDOMINAL HYSTERECTOMY      w/bladder susp/LSO       Family History   Problem Relation Age of Onset   • Diabetes Mother    • Hypertension Mother    • Coronary artery disease Mother    • Dementia Mother    • Heart disease Mother    • Diabetes Father    • Cancer Father         Lung Cancer       Social History     Socioeconomic History   • Marital status:      Spouse name: Not on file   • Number of children: Not on file   • Years of education: Not on file   • Highest education level: Not on file   Tobacco Use   • Smoking status: Never Smoker   • Smokeless tobacco: Never Used   Substance and Sexual Activity   • Alcohol use: No     Frequency: Never   • Drug use: No   • Sexual activity: Defer       66 y/o C female on the phone for f/u on Anx/ Depr    Pt states she no longer has the daily HA's but not sleeping well  Pt doesn't feel her anti-depressant med is working        The following portions of the patient's history were reviewed and updated as appropriate: allergies, current medications, past family history, past medical history, past social history, past surgical history and problem list.    Review of Systems   Constitutional: Positive for fatigue. Negative for activity change, appetite change, unexpected weight gain and unexpected weight loss.   Neurological: Negative for headache.   Psychiatric/Behavioral: Positive for dysphoric mood, sleep disturbance and depressed mood. The patient is nervous/anxious.        There were no vitals filed for this visit.    Objective   Physical Exam   Constitutional: She is oriented to person, place, and time. No  distress.   Pulmonary/Chest: No respiratory distress.   Neurological: She is alert and oriented to person, place, and time.   Psychiatric: She has a normal mood and affect. Her behavior is normal. Mood, judgment and thought content normal.   Nursing note and vitals reviewed.        Assessment/Plan   Michelle was seen today for depression and anxiety.    Diagnoses and all orders for this visit:    Adjustment insomnia  -     Lemborexant (DayVigo) 5 MG tablet; Take 1 tablet by mouth At Night As Needed (insomnia). May go to 2 po qhs prn insomnia  Indications: Trouble Sleeping    Anxiety  -     ALPRAZolam (XANAX) 0.25 MG tablet; Take 1 tablet by mouth 2 (Two) Times a Day As Needed for Anxiety.    Mixed anxiety depressive disorder    Other orders  -     buPROPion XL (WELLBUTRIN XL) 150 MG 24 hr tablet; Take 1 tablet by mouth Daily.        You have chosen to receive care through a telephone visit. Do you consent to use a telephone visit for your medical care today? {YES  This visit has been rescheduled as a phone visit to comply with patient safety concerns in accordance with CDC recommendations. Total time of discussion was 17 minutes.

## 2020-09-16 ENCOUNTER — TELEPHONE (OUTPATIENT)
Dept: FAMILY MEDICINE CLINIC | Facility: CLINIC | Age: 65
End: 2020-09-16

## 2020-09-16 NOTE — TELEPHONE ENCOUNTER
Patient stated she took the Dayvigo on Sunday night before bed.  She said when she woke up Monday morning she felt as if she was having a stroke.  She had numbness on the left side, and blurry vision.  Patient stated she was stumbling and fell 4 times on Monday.  Patient went to [facility] and they told her that everything was negative for stroke and that it could have been a side effect from the medication.  Patient said today is the first day she finally feels normal since then.

## 2020-09-18 RX ORDER — DICYCLOMINE HCL 20 MG
20 TABLET ORAL EVERY 6 HOURS PRN
Qty: 120 TABLET | Refills: 1 | Status: SHIPPED | OUTPATIENT
Start: 2020-09-18 | End: 2020-10-16

## 2020-09-18 NOTE — TELEPHONE ENCOUNTER
Last visit:  9/11/20  Next visit: none  Last labs: 3/15/20    Rx requested: Dicyclomine   Pharmacy: [facility] in [place]

## 2020-09-18 NOTE — TELEPHONE ENCOUNTER
Pt states she only took 1 tab then flushed the rest.   Records called for [facility] #[phone]

## 2020-09-19 ENCOUNTER — TELEPHONE (OUTPATIENT)
Dept: FAMILY MEDICINE CLINIC | Facility: CLINIC | Age: 65
End: 2020-09-19

## 2020-09-19 NOTE — TELEPHONE ENCOUNTER
"ON CALL MESSAGE:      Pt. called with concern of reaction to a medication, she believes it is her Xanax. She says she has noticed this past week that shortly after she takes the medication her legs feel \"antsy\" and it last all day and night.  She denies any leg pain, swelling, redness, warmth or rash.  She has not had injury.  She says she just rubs her legs all day.      She was advised to hold the medication and contact Dr. Kraus's office on Monday for further instructions.   It was advised she go to [facility] or ER this weekend, should leg symptoms worsen, she develops pain, swelling, redness in legs.  She verbalized understanding.   "

## 2020-09-21 NOTE — TELEPHONE ENCOUNTER
Pt lm stating she now needs pain med due to falling and hurting back, head, wrist and legs from the med reaction.  [facility]

## 2020-09-22 DIAGNOSIS — M25.50 POLYARTHRALGIA: ICD-10-CM

## 2020-09-22 RX ORDER — HYDROCODONE BITARTRATE AND ACETAMINOPHEN 5; 325 MG/1; MG/1
1 TABLET ORAL EVERY 6 HOURS PRN
Qty: 30 TABLET | Refills: 0 | Status: SHIPPED | OUTPATIENT
Start: 2020-09-22 | End: 2020-10-19 | Stop reason: SDUPTHER

## 2020-09-22 NOTE — TELEPHONE ENCOUNTER
Pt states her daughter took her to ER bc she thought she was having a stroke, which was r/o. Determined it was was dayvigo causing the effects, so she d/c'd and no longer has symptoms. However she fell 3x and is very sore. She will check to see if she has any pain meds leftover and may be calling back after dialysis if she is out.

## 2020-09-25 RX ORDER — AMLODIPINE BESYLATE 2.5 MG/1
TABLET ORAL
Qty: 90 TABLET | Refills: 0 | Status: SHIPPED | OUTPATIENT
Start: 2020-09-25

## 2020-09-25 RX ORDER — CARVEDILOL 6.25 MG/1
TABLET ORAL
Qty: 180 TABLET | Refills: 0 | Status: SHIPPED | OUTPATIENT
Start: 2020-09-25

## 2020-09-28 ENCOUNTER — TELEPHONE (OUTPATIENT)
Dept: FAMILY MEDICINE CLINIC | Facility: CLINIC | Age: 65
End: 2020-09-28

## 2020-09-28 RX ORDER — ESCITALOPRAM OXALATE 5 MG/1
5 TABLET ORAL NIGHTLY
Qty: 30 TABLET | Refills: 0 | Status: SHIPPED | OUTPATIENT
Start: 2020-09-28 | End: 2020-10-21

## 2020-09-28 NOTE — TELEPHONE ENCOUNTER
lexapro works pretty quick or can try zoloft .... Let me know and I will send it out w/ f/u in 3-4 weeks by tele or office

## 2020-09-28 NOTE — TELEPHONE ENCOUNTER
Pt left message on the machine crying and begging you to help her with itching and nerves.  I just spoke with her this morning and scheduled her 1 mo f/u as instructed.

## 2020-09-28 NOTE — TELEPHONE ENCOUNTER
Patient called this morning stating she has had worsening panic attacks.  She recently moved in an apartment by herself and since then they have gotten significantly worse.  Patient states she currently takes Xanax but it doesn't seem to be working.

## 2020-09-29 DIAGNOSIS — F41.9 ANXIETY: ICD-10-CM

## 2020-09-29 RX ORDER — ALPRAZOLAM 0.25 MG/1
0.25 TABLET ORAL 3 TIMES DAILY PRN
Qty: 40 TABLET | Refills: 0 | Status: SHIPPED | OUTPATIENT
Start: 2020-09-29 | End: 2020-10-19 | Stop reason: SDUPTHER

## 2020-10-16 RX ORDER — DICYCLOMINE HCL 20 MG
20 TABLET ORAL EVERY 6 HOURS PRN
Qty: 120 TABLET | Refills: 1 | Status: SHIPPED | OUTPATIENT
Start: 2020-10-16 | End: 2020-11-16

## 2020-10-19 ENCOUNTER — TELEPHONE (OUTPATIENT)
Dept: FAMILY MEDICINE CLINIC | Facility: CLINIC | Age: 65
End: 2020-10-19

## 2020-10-19 DIAGNOSIS — M25.50 POLYARTHRALGIA: ICD-10-CM

## 2020-10-19 DIAGNOSIS — F41.9 ANXIETY: ICD-10-CM

## 2020-10-19 RX ORDER — ALPRAZOLAM 0.25 MG/1
0.25 TABLET ORAL 3 TIMES DAILY PRN
Qty: 40 TABLET | Refills: 0 | Status: SHIPPED | OUTPATIENT
Start: 2020-10-19 | End: 2020-12-07 | Stop reason: SDUPTHER

## 2020-10-19 RX ORDER — HYDROCODONE BITARTRATE AND ACETAMINOPHEN 5; 325 MG/1; MG/1
1 TABLET ORAL EVERY 6 HOURS PRN
Qty: 30 TABLET | Refills: 0 | Status: SHIPPED | OUTPATIENT
Start: 2020-10-19 | End: 2020-12-07 | Stop reason: SDUPTHER

## 2020-10-19 NOTE — TELEPHONE ENCOUNTER
Pt states she is feeling better.  Had to go to ER in [place] and they dx her with cellulitis.  She is requesting refills on xanax and percocet be sent to [facility].  Last seen 9/2020

## 2020-10-21 RX ORDER — ESCITALOPRAM OXALATE 10 MG/1
10 TABLET ORAL
Qty: 30 TABLET | Refills: 0 | Status: SHIPPED | OUTPATIENT
Start: 2020-10-21 | End: 2020-11-16

## 2020-10-23 DIAGNOSIS — M25.50 POLYARTHRALGIA: ICD-10-CM

## 2020-10-23 RX ORDER — HYDROCODONE BITARTRATE AND ACETAMINOPHEN 5; 325 MG/1; MG/1
1 TABLET ORAL EVERY 6 HOURS PRN
Qty: 30 TABLET | Refills: 0 | OUTPATIENT
Start: 2020-10-23

## 2020-10-23 NOTE — TELEPHONE ENCOUNTER
Hydrocodone 5    [facility]    Last Seen 9/11/2020  Next Appt 10/27/2020

## 2020-10-27 ENCOUNTER — TELEPHONE (OUTPATIENT)
Dept: FAMILY MEDICINE CLINIC | Facility: CLINIC | Age: 65
End: 2020-10-27

## 2020-10-27 NOTE — TELEPHONE ENCOUNTER
Pt requesting [place] and Xanax.  [facility].  Last seen 9/2020.  Cancelled todays appt.

## 2020-11-10 ENCOUNTER — TELEPHONE (OUTPATIENT)
Dept: FAMILY MEDICINE CLINIC | Facility: CLINIC | Age: 65
End: 2020-11-10

## 2020-11-11 ENCOUNTER — TELEPHONE (OUTPATIENT)
Dept: FAMILY MEDICINE CLINIC | Facility: CLINIC | Age: 65
End: 2020-11-11

## 2020-11-11 NOTE — TELEPHONE ENCOUNTER
INR 2.4, PT 28.6 PER [facility].  ALSO REPORTS MINOR NOSE BLEEDING AND GUM BLEEDING THAT IS NEW.   IF ANY CHANGES PLEASE CALL THE HOME HEALTH NURSE MIGUELITO [phone]

## 2020-11-11 NOTE — TELEPHONE ENCOUNTER
I DON'T KNOW HOW MANY WAS SENT TO [facility] BUT THAT IS HER REGULAR PHARMACY.  I GUESS YOU CAN JUST CALL [facility] AND CANCEL THAT RX AND SEND ONE IN TO [facility] IN [place].

## 2020-11-11 NOTE — TELEPHONE ENCOUNTER
How many did they send in the [facility]------would prob be better to just override that Rx and send the whole thing to her normal pharmacy

## 2020-11-11 NOTE — TELEPHONE ENCOUNTER
INR GOOD----What dose of coumadin is she on and how long has she been taking this dose?  If conts w/ bleeding, we may need to try eliquis or other med instead of coumadin

## 2020-11-12 ENCOUNTER — TELEPHONE (OUTPATIENT)
Dept: FAMILY MEDICINE CLINIC | Facility: CLINIC | Age: 65
End: 2020-11-12

## 2020-11-13 ENCOUNTER — TELEPHONE (OUTPATIENT)
Dept: FAMILY MEDICINE CLINIC | Facility: CLINIC | Age: 65
End: 2020-11-13

## 2020-11-16 RX ORDER — ESCITALOPRAM OXALATE 10 MG/1
TABLET ORAL
Qty: 30 TABLET | Refills: 0 | Status: SHIPPED | OUTPATIENT
Start: 2020-11-16 | End: 2020-12-01 | Stop reason: SDUPTHER

## 2020-11-16 RX ORDER — DICYCLOMINE HCL 20 MG
20 TABLET ORAL EVERY 6 HOURS PRN
Qty: 120 TABLET | Refills: 1 | Status: SHIPPED | OUTPATIENT
Start: 2020-11-16 | End: 2020-12-03

## 2020-11-16 NOTE — TELEPHONE ENCOUNTER
Last visit: 9/11/20  Next visit: none  Last labs: 3/15/20    Rx requested: Esitalopram Dicyclomine   Pharmacy: [facility] in [place]

## 2020-11-19 ENCOUNTER — TELEPHONE (OUTPATIENT)
Dept: FAMILY MEDICINE CLINIC | Facility: CLINIC | Age: 65
End: 2020-11-19

## 2020-11-19 NOTE — TELEPHONE ENCOUNTER
Savi from [facility] called stating that she checked Rhondas INR today and it was 1.5 and she is currently on Warfarin 1mg BID.       Also she had a fall on Sunday and has swelling on her face down to her neck with a goose egg on her head and does not want to go to the ER because she feels like it is getting better and no dizziness.     Patients BP was 190/80 today       Savi with [facility] : [phone]     Savi was told that we would let  know and we will call her back.

## 2020-11-19 NOTE — TELEPHONE ENCOUNTER
I called Patricia from [facility] and she said the last INR was 2.4 and she has been on the warfarin 1mg 1 po bid since 11/9/20.  Savi is going back today to check her INR and will call us with those results and we will go from there.

## 2020-11-20 ENCOUNTER — TELEPHONE (OUTPATIENT)
Dept: FAMILY MEDICINE CLINIC | Facility: CLINIC | Age: 65
End: 2020-11-20

## 2020-11-20 NOTE — TELEPHONE ENCOUNTER
Sampson with [facility] called and left a VM reporting that the patient had a missed appt today as she was not home. They will try to see her next week.  (Just a FYI,they are required to call)    Sampson Ayala [phone]

## 2020-11-20 NOTE — TELEPHONE ENCOUNTER
I tried to call her [facility] nurse and she didn't answer and it would let me leave a VM so I called [facility] and they said they would relay the info to her nurse.

## 2020-11-20 NOTE — TELEPHONE ENCOUNTER
I tried to call [facility] but was forwarded to her VM and there was no room on her VM to leave a message.       I called [facility] and they said they would give Savi the message.

## 2020-11-20 NOTE — TELEPHONE ENCOUNTER
Spoke w/ pt nella about HHC concerns    She admits she forgot a couple days of her coumadin before this INR was taken----she will cont on 2mg qday and we need to call [facility] to rech INR on Monday if poss    Pt states the swelling of her face is improving----her eye is not swollen now and the knot on her head is much smaller;  Pt states she does not take her am BP med on the days of HD (so that is why her BP was high)........She admits she did skip HD today due to not wanting them to see her face but will go on Sat.......then she had HD on Tu and Wed (skipping THURS due to Thanksgiving) and going back on Sat

## 2020-11-24 ENCOUNTER — TELEPHONE (OUTPATIENT)
Dept: FAMILY MEDICINE CLINIC | Facility: CLINIC | Age: 65
End: 2020-11-24

## 2020-12-01 ENCOUNTER — TELEPHONE (OUTPATIENT)
Dept: FAMILY MEDICINE CLINIC | Facility: CLINIC | Age: 65
End: 2020-12-01

## 2020-12-01 RX ORDER — ESCITALOPRAM OXALATE 10 MG/1
10 TABLET ORAL
Qty: 90 TABLET | Refills: 0 | Status: SHIPPED | OUTPATIENT
Start: 2020-12-01

## 2020-12-01 NOTE — TELEPHONE ENCOUNTER
Refill Escitalopram 10mg / Dicyclomine 20mg.    Last seen 9/2020.  [facility]

## 2020-12-03 RX ORDER — DICYCLOMINE HCL 20 MG
20 TABLET ORAL EVERY 6 HOURS PRN
Qty: 360 TABLET | Refills: 0 | Status: SHIPPED | OUTPATIENT
Start: 2020-12-03

## 2020-12-03 NOTE — TELEPHONE ENCOUNTER
Dicycloine 20mg (Requesting 90 day supply)    [facility]    Last Seen 9/11/2020  No future appt sched

## 2020-12-07 ENCOUNTER — TELEPHONE (OUTPATIENT)
Dept: FAMILY MEDICINE CLINIC | Facility: CLINIC | Age: 65
End: 2020-12-07

## 2020-12-07 DIAGNOSIS — F41.9 ANXIETY: ICD-10-CM

## 2020-12-07 DIAGNOSIS — M25.50 POLYARTHRALGIA: ICD-10-CM

## 2020-12-07 RX ORDER — ALPRAZOLAM 0.25 MG/1
0.25 TABLET ORAL 3 TIMES DAILY PRN
Qty: 40 TABLET | Refills: 0 | Status: SHIPPED | OUTPATIENT
Start: 2020-12-07

## 2020-12-07 RX ORDER — HYDROCODONE BITARTRATE AND ACETAMINOPHEN 5; 325 MG/1; MG/1
1 TABLET ORAL EVERY 6 HOURS PRN
Qty: 30 TABLET | Refills: 0 | Status: SHIPPED | OUTPATIENT
Start: 2020-12-07

## 2020-12-11 ENCOUNTER — TELEPHONE (OUTPATIENT)
Dept: FAMILY MEDICINE CLINIC | Facility: CLINIC | Age: 65
End: 2020-12-11

## 2020-12-11 NOTE — TELEPHONE ENCOUNTER
Nessa with [facility] called and left a VM stating that they have been unable to locate the patient,they have called multiple times and no one will call them back x2weeks. Dialysis is trying to get her into [facility] but she is not there.    [phone]

## 2020-12-14 ENCOUNTER — TELEPHONE (OUTPATIENT)
Dept: FAMILY MEDICINE CLINIC | Facility: CLINIC | Age: 65
End: 2020-12-14

## 2020-12-14 NOTE — TELEPHONE ENCOUNTER
See if pt qualifies and if she wants to have [facility] machine to take her own INR lab----she would HAVE TO TAKE it qwk per guidelines

## 2020-12-14 NOTE — TELEPHONE ENCOUNTER
----- Message from Naty Kraus, DO sent at 12/11/2020  9:06 PM EST -----  Try to call pt and see if she is still taking coumadin..... and getting her INR checked    What is [facility]???

## 2020-12-14 NOTE — TELEPHONE ENCOUNTER
Patient states that she is taking coumadin 3mg daily. I asked her when she had her INR checked last and she said last week. I asked her if [facility] was still coming out to see her and she said yes they came out last week and checked her INR but she doesn't remember what the INR results were....    Also [facility] a nursing home.

## 2020-12-14 NOTE — TELEPHONE ENCOUNTER
They take her insurance and they just require that the patient to have been on coumadin for over 90 days.   Do you know if the patient has been on it for that long? I don't see coumadin on her med list.

## 2020-12-15 RX ORDER — WARFARIN SODIUM 3 MG/1
3 TABLET ORAL DAILY
Qty: 30 TABLET | Refills: 0 | Status: SHIPPED | OUTPATIENT
Start: 2020-12-15

## 2020-12-15 NOTE — TELEPHONE ENCOUNTER
Pt states been on it since she came out of [facility], about 2 mo. Taking 3mg qd.

## 2020-12-15 NOTE — TELEPHONE ENCOUNTER
Then she needs to let [facility] come in an take an INR!!!!!!!!!!!!!

## 2020-12-16 NOTE — TELEPHONE ENCOUNTER
MULTIPLE attempts (at least 6x) to contact pt - does not answer and no VM options.   If you want to still order HH for this, please order and I will fax it to whomever and maybe they can reach her?

## 2020-12-29 RX ORDER — WARFARIN SODIUM 3 MG/1
3 TABLET ORAL DAILY
Qty: 30 TABLET | Refills: 0 | OUTPATIENT
Start: 2020-12-29

## 2020-12-29 NOTE — TELEPHONE ENCOUNTER
Warfarin 3mg (requesting 90 day supply)    [facility]    Last Seen 9/11/2020 (Has no showed and cx'd several appts since then)  No future appt sched

## 2021-03-01 NOTE — TELEPHONE ENCOUNTER
Last visit:  9/11/20  Next visit: none  Last labs: 3/15/20    Rx requested: Bentyl,Lexapro  Pharmacy: [facility] in [place]

## 2021-03-01 NOTE — TELEPHONE ENCOUNTER
See if she is willing to do tele visit--------not sure if she is even at home or in rehab facility

## 2021-03-02 RX ORDER — DICYCLOMINE HCL 20 MG
20 TABLET ORAL EVERY 6 HOURS PRN
Qty: 360 TABLET | Refills: 0 | OUTPATIENT
Start: 2021-03-02

## 2021-03-02 RX ORDER — BUPROPION HYDROCHLORIDE 150 MG/1
TABLET ORAL
Qty: 90 TABLET | Refills: 1 | OUTPATIENT
Start: 2021-03-02

## 2021-03-02 RX ORDER — ESCITALOPRAM OXALATE 10 MG/1
TABLET ORAL
Qty: 90 TABLET | Refills: 0 | OUTPATIENT
Start: 2021-03-02

## 2021-05-14 ENCOUNTER — PATIENT OUTREACH (OUTPATIENT)
Dept: CASE MANAGEMENT | Facility: OTHER | Age: 66
End: 2021-05-14

## 2021-05-14 NOTE — OUTREACH NOTE
Care Coordination Note    Per [facility] [place] obituary: Michelle Howell ( 55)  on 20 @ [facility].    Olivia Yun RN  Ambulatory     2021, 14:19 EDT

## (undated) DEVICE — CATH DIAG IMPULSE FR4 6F 100CM

## (undated) DEVICE — CATH DIAG IMPULSE PIG .056 6F 110CM

## (undated) DEVICE — GUIDE CATHETER: Brand: MACH1™

## (undated) DEVICE — PINNACLE INTRODUCER SHEATH: Brand: PINNACLE

## (undated) DEVICE — DEV INFL COMPAK W/ACCESSPLUS IN4530

## (undated) DEVICE — PK TRY HEART CATH 50

## (undated) DEVICE — RADIFOCUS OBTURATOR: Brand: RADIFOCUS

## (undated) DEVICE — CATH DIAG IMPULSE FL4 6F 100CM

## (undated) DEVICE — CONTRST ISOVUE300 61PCT 50ML

## (undated) DEVICE — ELECTRD DEFIB M/FUNC PROPADZ RADIOL 2PK

## (undated) DEVICE — GW DIAG EMERALD HEPCOAT MOVE JTIP STD .035 3MM 150CM

## (undated) DEVICE — HI-TORQUE WHISPER MS GUIDE WIRE .014 STRAIGHT TIP 3.0 CM X 190 CM: Brand: HI-TORQUE WHISPER